# Patient Record
Sex: FEMALE | Race: ASIAN | Employment: OTHER | URBAN - METROPOLITAN AREA
[De-identification: names, ages, dates, MRNs, and addresses within clinical notes are randomized per-mention and may not be internally consistent; named-entity substitution may affect disease eponyms.]

---

## 2017-02-04 PROBLEM — I27.20 PULMONARY HYPERTENSION (HCC): Status: ACTIVE | Noted: 2017-02-04

## 2017-03-02 ENCOUNTER — APPOINTMENT (OUTPATIENT)
Dept: GENERAL RADIOLOGY | Facility: HOSPITAL | Age: 82
End: 2017-03-02
Attending: EMERGENCY MEDICINE
Payer: MEDICARE

## 2017-03-02 ENCOUNTER — APPOINTMENT (OUTPATIENT)
Dept: CT IMAGING | Facility: HOSPITAL | Age: 82
End: 2017-03-02
Attending: EMERGENCY MEDICINE
Payer: MEDICARE

## 2017-03-02 ENCOUNTER — HOSPITAL ENCOUNTER (OUTPATIENT)
Facility: HOSPITAL | Age: 82
Setting detail: OBSERVATION
Discharge: HOME HEALTH | End: 2017-03-04
Attending: EMERGENCY MEDICINE | Admitting: HOSPITALIST
Payer: MEDICARE
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
## 2017-03-02 DIAGNOSIS — R55 SYNCOPE AND COLLAPSE: ICD-10-CM

## 2017-03-02 DIAGNOSIS — S16.1XXA CERVICAL STRAIN, INITIAL ENCOUNTER: ICD-10-CM

## 2017-03-02 DIAGNOSIS — S05.11XA CONTUSION OF RIGHT ORBITAL TISSUES, INITIAL ENCOUNTER: Primary | ICD-10-CM

## 2017-03-02 DIAGNOSIS — T07.XXXA MULTIPLE CONTUSIONS: ICD-10-CM

## 2017-03-02 LAB
ALBUMIN SERPL-MCNC: 3.4 G/DL (ref 3.5–4.8)
ALP LIVER SERPL-CCNC: 72 U/L (ref 55–142)
ALT SERPL-CCNC: 19 U/L (ref 14–54)
APTT PPP: 31.1 SECONDS (ref 25–34)
AST SERPL-CCNC: 28 U/L (ref 15–41)
BASOPHILS # BLD AUTO: 0.04 X10(3) UL (ref 0–0.1)
BASOPHILS NFR BLD AUTO: 0.3 %
BILIRUB SERPL-MCNC: 0.3 MG/DL (ref 0.1–2)
BILIRUB UR QL STRIP.AUTO: NEGATIVE
BUN BLD-MCNC: 24 MG/DL (ref 8–20)
CALCIUM BLD-MCNC: 9.7 MG/DL (ref 8.3–10.3)
CHLORIDE: 109 MMOL/L (ref 101–111)
CK: 657 IU/L (ref 26–192)
CKMB: 11.3 NG/ML (ref 0–5)
CLARITY UR REFRACT.AUTO: CLEAR
CO2: 26 MMOL/L (ref 22–32)
COLOR UR AUTO: YELLOW
CREAT BLD-MCNC: 1.02 MG/DL (ref 0.55–1.02)
EOSINOPHIL # BLD AUTO: 0.01 X10(3) UL (ref 0–0.3)
EOSINOPHIL NFR BLD AUTO: 0.1 %
ERYTHROCYTE [DISTWIDTH] IN BLOOD BY AUTOMATED COUNT: 12.7 % (ref 11.5–16)
GLUCOSE BLD-MCNC: 81 MG/DL (ref 65–99)
GLUCOSE BLD-MCNC: 98 MG/DL (ref 70–99)
GLUCOSE UR STRIP.AUTO-MCNC: NEGATIVE MG/DL
HCT VFR BLD AUTO: 36.1 % (ref 34–50)
HGB BLD-MCNC: 12.1 G/DL (ref 12–16)
IMMATURE GRANULOCYTE COUNT: 0.05 X10(3) UL (ref 0–1)
IMMATURE GRANULOCYTE RATIO %: 0.4 %
INR BLD: 1.04 (ref 0.89–1.11)
KETONES UR STRIP.AUTO-MCNC: NEGATIVE MG/DL
LEUKOCYTE ESTERASE UR QL STRIP.AUTO: NEGATIVE
LYMPHOCYTES # BLD AUTO: 0.88 X10(3) UL (ref 0.9–4)
LYMPHOCYTES NFR BLD AUTO: 6.9 %
M PROTEIN MFR SERPL ELPH: 7.9 G/DL (ref 6.1–8.3)
MB INDEX: 2 % (ref ?–4)
MCH RBC QN AUTO: 30.8 PG (ref 27–33.2)
MCHC RBC AUTO-ENTMCNC: 33.5 G/DL (ref 31–37)
MCV RBC AUTO: 91.9 FL (ref 81–100)
MONOCYTES # BLD AUTO: 0.7 X10(3) UL (ref 0.1–0.6)
MONOCYTES NFR BLD AUTO: 5.5 %
NEUTROPHIL ABS PRELIM: 11.02 X10 (3) UL (ref 1.3–6.7)
NEUTROPHILS # BLD AUTO: 11.02 X10(3) UL (ref 1.3–6.7)
NEUTROPHILS NFR BLD AUTO: 86.8 %
NITRITE UR QL STRIP.AUTO: NEGATIVE
PH UR STRIP.AUTO: 7 [PH] (ref 4.5–8)
PLATELET # BLD AUTO: 318 10(3)UL (ref 150–450)
POTASSIUM SERPL-SCNC: 4.5 MMOL/L (ref 3.6–5.1)
PROT UR STRIP.AUTO-MCNC: NEGATIVE MG/DL
PSA SERPL DL<=0.01 NG/ML-MCNC: 13.6 SECONDS (ref 12–14.3)
RBC # BLD AUTO: 3.93 X10(6)UL (ref 3.8–5.1)
RBC UR QL AUTO: NEGATIVE
RED CELL DISTRIBUTION WIDTH-SD: 42.5 FL (ref 35.1–46.3)
SODIUM SERPL-SCNC: 141 MMOL/L (ref 136–144)
SP GR UR STRIP.AUTO: 1.01 (ref 1–1.03)
TROPONIN: <0.046 NG/ML (ref ?–0.05)
UROBILINOGEN UR STRIP.AUTO-MCNC: <2 MG/DL
WBC # BLD AUTO: 12.7 X10(3) UL (ref 4–13)

## 2017-03-02 PROCEDURE — 80053 COMPREHEN METABOLIC PANEL: CPT | Performed by: EMERGENCY MEDICINE

## 2017-03-02 PROCEDURE — 99285 EMERGENCY DEPT VISIT HI MDM: CPT

## 2017-03-02 PROCEDURE — 70450 CT HEAD/BRAIN W/O DYE: CPT

## 2017-03-02 PROCEDURE — 82553 CREATINE MB FRACTION: CPT | Performed by: EMERGENCY MEDICINE

## 2017-03-02 PROCEDURE — 81003 URINALYSIS AUTO W/O SCOPE: CPT | Performed by: EMERGENCY MEDICINE

## 2017-03-02 PROCEDURE — 82550 ASSAY OF CK (CPK): CPT | Performed by: EMERGENCY MEDICINE

## 2017-03-02 PROCEDURE — 85025 COMPLETE CBC W/AUTO DIFF WBC: CPT | Performed by: EMERGENCY MEDICINE

## 2017-03-02 PROCEDURE — 93005 ELECTROCARDIOGRAM TRACING: CPT

## 2017-03-02 PROCEDURE — 71020 XR CHEST PA + LAT CHEST (CPT=71020): CPT

## 2017-03-02 PROCEDURE — 96361 HYDRATE IV INFUSION ADD-ON: CPT

## 2017-03-02 PROCEDURE — 73030 X-RAY EXAM OF SHOULDER: CPT

## 2017-03-02 PROCEDURE — 96375 TX/PRO/DX INJ NEW DRUG ADDON: CPT

## 2017-03-02 PROCEDURE — 85610 PROTHROMBIN TIME: CPT | Performed by: EMERGENCY MEDICINE

## 2017-03-02 PROCEDURE — 93010 ELECTROCARDIOGRAM REPORT: CPT

## 2017-03-02 PROCEDURE — 73080 X-RAY EXAM OF ELBOW: CPT

## 2017-03-02 PROCEDURE — 85730 THROMBOPLASTIN TIME PARTIAL: CPT | Performed by: EMERGENCY MEDICINE

## 2017-03-02 PROCEDURE — 73560 X-RAY EXAM OF KNEE 1 OR 2: CPT

## 2017-03-02 PROCEDURE — 82962 GLUCOSE BLOOD TEST: CPT

## 2017-03-02 PROCEDURE — 72125 CT NECK SPINE W/O DYE: CPT

## 2017-03-02 PROCEDURE — 84484 ASSAY OF TROPONIN QUANT: CPT | Performed by: EMERGENCY MEDICINE

## 2017-03-02 PROCEDURE — 96374 THER/PROPH/DIAG INJ IV PUSH: CPT

## 2017-03-02 PROCEDURE — 73130 X-RAY EXAM OF HAND: CPT

## 2017-03-02 RX ORDER — HYDROMORPHONE HYDROCHLORIDE 1 MG/ML
0.5 INJECTION, SOLUTION INTRAMUSCULAR; INTRAVENOUS; SUBCUTANEOUS ONCE
Status: COMPLETED | OUTPATIENT
Start: 2017-03-02 | End: 2017-03-02

## 2017-03-02 RX ORDER — ONDANSETRON 2 MG/ML
INJECTION INTRAMUSCULAR; INTRAVENOUS
Status: COMPLETED
Start: 2017-03-02 | End: 2017-03-02

## 2017-03-02 RX ORDER — SODIUM CHLORIDE 9 MG/ML
INJECTION, SOLUTION INTRAVENOUS CONTINUOUS
Status: DISCONTINUED | OUTPATIENT
Start: 2017-03-02 | End: 2017-03-03

## 2017-03-02 RX ORDER — HYDROMORPHONE HYDROCHLORIDE 1 MG/ML
0.5 INJECTION, SOLUTION INTRAMUSCULAR; INTRAVENOUS; SUBCUTANEOUS EVERY 30 MIN PRN
Status: ACTIVE | OUTPATIENT
Start: 2017-03-02 | End: 2017-03-03

## 2017-03-02 RX ORDER — ONDANSETRON 2 MG/ML
4 INJECTION INTRAMUSCULAR; INTRAVENOUS EVERY 4 HOURS PRN
Status: DISCONTINUED | OUTPATIENT
Start: 2017-03-02 | End: 2017-03-04

## 2017-03-02 RX ORDER — ONDANSETRON 2 MG/ML
4 INJECTION INTRAMUSCULAR; INTRAVENOUS ONCE
Status: COMPLETED | OUTPATIENT
Start: 2017-03-02 | End: 2017-03-02

## 2017-03-03 ENCOUNTER — APPOINTMENT (OUTPATIENT)
Dept: CV DIAGNOSTICS | Facility: HOSPITAL | Age: 82
End: 2017-03-03
Attending: HOSPITALIST
Payer: MEDICARE

## 2017-03-03 ENCOUNTER — APPOINTMENT (OUTPATIENT)
Dept: GENERAL RADIOLOGY | Facility: HOSPITAL | Age: 82
End: 2017-03-03
Attending: HOSPITALIST
Payer: MEDICARE

## 2017-03-03 ENCOUNTER — APPOINTMENT (OUTPATIENT)
Dept: MRI IMAGING | Facility: HOSPITAL | Age: 82
End: 2017-03-03
Attending: PHYSICIAN ASSISTANT
Payer: MEDICARE

## 2017-03-03 LAB
ATRIAL RATE: 92 BPM
CK: 479 IU/L (ref 26–192)
CKMB: 7.4 NG/ML (ref 0–5)
MB INDEX: 2 % (ref ?–4)
P AXIS: 64 DEGREES
P-R INTERVAL: 192 MS
Q-T INTERVAL: 378 MS
QRS DURATION: 72 MS
QTC CALCULATION (BEZET): 467 MS
R AXIS: -5 DEGREES
T AXIS: 25 DEGREES
TROPONIN: <0.046 NG/ML (ref ?–0.05)
VENTRICULAR RATE: 92 BPM

## 2017-03-03 PROCEDURE — 93306 TTE W/DOPPLER COMPLETE: CPT

## 2017-03-03 PROCEDURE — 72100 X-RAY EXAM L-S SPINE 2/3 VWS: CPT

## 2017-03-03 PROCEDURE — 72141 MRI NECK SPINE W/O DYE: CPT

## 2017-03-03 PROCEDURE — 82553 CREATINE MB FRACTION: CPT | Performed by: HOSPITALIST

## 2017-03-03 PROCEDURE — 82550 ASSAY OF CK (CPK): CPT | Performed by: HOSPITALIST

## 2017-03-03 PROCEDURE — 72072 X-RAY EXAM THORAC SPINE 3VWS: CPT

## 2017-03-03 PROCEDURE — 93306 TTE W/DOPPLER COMPLETE: CPT | Performed by: INTERNAL MEDICINE

## 2017-03-03 PROCEDURE — 84484 ASSAY OF TROPONIN QUANT: CPT | Performed by: HOSPITALIST

## 2017-03-03 RX ORDER — ECHINACEA PURPUREA EXTRACT 125 MG
1 TABLET ORAL
Status: DISCONTINUED | OUTPATIENT
Start: 2017-03-03 | End: 2017-03-04

## 2017-03-03 RX ORDER — HYDROCODONE BITARTRATE AND ACETAMINOPHEN 5; 325 MG/1; MG/1
1 TABLET ORAL EVERY 6 HOURS PRN
Status: DISCONTINUED | OUTPATIENT
Start: 2017-03-03 | End: 2017-03-04

## 2017-03-03 RX ORDER — SODIUM CHLORIDE 9 MG/ML
INJECTION, SOLUTION INTRAVENOUS CONTINUOUS
Status: DISCONTINUED | OUTPATIENT
Start: 2017-03-03 | End: 2017-03-04

## 2017-03-03 RX ORDER — PANTOPRAZOLE SODIUM 40 MG/1
40 TABLET, DELAYED RELEASE ORAL
Status: DISCONTINUED | OUTPATIENT
Start: 2017-03-03 | End: 2017-03-04

## 2017-03-03 RX ORDER — FLUVOXAMINE MALEATE 50 MG/1
25 TABLET, COATED ORAL NIGHTLY
Status: DISCONTINUED | OUTPATIENT
Start: 2017-03-03 | End: 2017-03-04

## 2017-03-03 NOTE — ED PROVIDER NOTES
Patient Seen in: [facility] Emergency Department    History   Patient presents with:  Head Neck Injury (neurologic, musculoskeletal)    Stated Complaint: fall, no blood thinners, nosebleed, bruising to face    HPI  Patient is an 42-year-old female w CHOLECYSTECTOMY      HERNIA SURGERY      Comment From gallbladder surgery    CATARACT      Comment Both eyes    HYSTERECTOMY  1968    INJECTION, W/WO CONTRAST, DX/THERAPEUTIC SUBSTANCE, EPIDURAL/SUBARACHNOID; CERVICAL/THORACIC N/A 6/20/2014    Comment P PATIENT DOCUMENTED NOT TO HAVE EXPERIENCED ANY OF THE FOLLOWING EVENTS N/A 8/14/2014    Comment Procedure: CERVICAL EPIDURAL;  Surgeon: Kimberly Bejarano DO;  Location: [address] [place] [address]    INJECTION, W/WO CONTRAST, DX/THERAPEUTIC SUBSTANCE, E EPIDURAL INJECTION;  Surgeon: Josse Loyd DO;  Location: [address] [place] [address]     N/A 5/29/2016    Comment Procedure: EXPLORATORY LAPAROTOMY;  Surgeon: Adrián Victor MD;  Location:  MAIN OR    INJECTION, W/WO CONTRAST, DX/THERAPEUTIC SUBSTANC Take 650 mg by mouth every 8 (eight) hours as needed for Pain. Cholecalciferol (VITAMIN D) 2000 UNITS Oral Cap,  Take 2,000 Units by mouth daily. Calcium 500-125 MG-UNIT Oral Tab,  Take 1 tablet by mouth daily.    Multiple Vitamins-Minerals (CENTRUM MIQUEL No rebound, no guarding, no hepatosplenomegaly. EXTREMITIES: Patient has tenderness to right shoulder, right elbow and right hand. No definite swelling noted. Patient is able to move right elbow right shoulder right hand with mild pain.   Sensation intac DIFFERENTIAL WITH PLATELET.   Procedure                               Abnormality         Status                     ---------                               -----------         ------                     CBC W/ DIFFERENTIAL[[id number]]          Abnormal

## 2017-03-03 NOTE — ED NOTES
ED APCT-Angel accompanied patient out of department for testing - to Ct Scan per cart with c-collar in place.

## 2017-03-03 NOTE — PROGRESS NOTES
Orthostatics       03/03/17 1216 03/03/17 1218 03/03/17 1220   Vital Signs   Pulse 78 79 81   Heart Rate Source Monitor Monitor Monitor   Resp 11 12 12   Respiratory Quality Normal Normal Normal   /46 mmHg 118/57 mmHg 139/56 mmHg   BP Location Right

## 2017-03-03 NOTE — H&P
[facility] Hospitalist History and Physical      Patient presents with:  Head Neck Injury (neurologic, musculoskeletal)       PCP: Felisa Vilchis MD      History of Present Illness: Patient is a 80year old female with PMH sig for HTN, HL, GERD, OA/cervical arthri SURGERY      Comment From gallbladder surgery    CATARACT      Comment Both eyes    HYSTERECTOMY  1968    INJECTION, W/WO CONTRAST, DX/THERAPEUTIC SUBSTANCE, EPIDURAL/SUBARACHNOID; CERVICAL/THORACIC N/A 6/20/2014    Comment Procedure: CERVICAL EPIDURAL INJ EXPERIENCED ANY OF THE FOLLOWING EVENTS N/A 8/14/2014    Comment Procedure: CERVICAL EPIDURAL;  Surgeon: Roro Keith DO;  Location: [address] [place] [address] FOR PAIN MANAGEMENT    INJECTION, W/WO CONTRAST, DX/THERAPEUTIC SUBSTANCE, EPIDURAL/SUBARACHNOID; CERVICAL/ Eliseo Simmons DO;  Location: [address] [place] [address]     N/A 5/29/2016    Comment Procedure: EXPLORATORY LAPAROTOMY;  Surgeon: Moraima Stuart MD;  Location: [address] [place] [address] OR    INJECTION, W/WO CONTRAST, DX/THERAPEUTIC SUBSTANCE, EPIDURAL/SUBARACHNOID; CERV (eight) hours as needed for Pain. Disp:  Rfl:    Cholecalciferol (VITAMIN D) 2000 UNITS Oral Cap Take 2,000 Units by mouth daily. Disp:  Rfl:    Calcium 500-125 MG-UNIT Oral Tab Take 1 tablet by mouth daily.  Disp:  Rfl:    Multiple Vitamins-Minerals (CENTR TP 7.9 03/02/2017   AST 28 03/02/2017   ALT 19 03/02/2017   PTT 31.1 03/02/2017   INR 1.04 03/02/2017   PTP 13.6 03/02/2017   TROP <0.046 03/03/2017    03/03/2017   CKMB 7.4 03/03/2017   PGLU 81 03/02/2017       CXR: image personally reviewed. Negative. No visible soft tissue swelling. EFFUSION:  None visible. OTHER:  Negative. 3/2/2017  CONCLUSION:  Negative for fracture.     Dictated by: Roberto Kim MD on 3/02/2017 at 22:49     Approved by: Roberto Kim MD            Xr Elbow, Comp 22: 54     Approved by: Paula Chris MD            Xr Shoulder, Complete (min 2 Views), Right (cpt=73030)    3/2/2017  PROCEDURE:  XR SHOULDER, COMPLETE (MIN 2 VIEWS), RIGHT (CPT=73030)     TECHNIQUE:  Multiple views were obtained. COMPARISON:  None.   I 3/02/2017 at 21:16     Approved by: Sandra Oneil MD            Ct Spine Cervical (cpt=72125)    3/2/2017  PROCEDURE:  CT OF THE CERVICAL SPINE WITHOUT CONTRAST  COMPARISON:  None.   INDICATIONS:  fall, no blood thinners, nosebleed, bruising to face  [facility] records or previous hospital records reviewed. [facility] hospitalist to continue to follow patient while in house  A total of 70 minutes taken with patient and coordinating care. Greater than 50% face to face encounter.       Dennis Jaimes MD  [facility] Hospitalist

## 2017-03-03 NOTE — ED INITIAL ASSESSMENT (HPI)
Patient presents after found by daughter after unwitnessed fall. Patient's daughter states she returned home from work and went to open door and patient was on the floor blocking her entry.  Patient speaks no [place], but reported to daughter that she went

## 2017-03-03 NOTE — PLAN OF CARE
No focal neuro deficits, no sensation deficits, no incontinence  Hemodynamically stable without orthostatic hypotension and maintains NSR  Up with assistance to the bathroom  Physical therapy to see  Seen by Spine; MRI pending    NEUROLOGICAL - ADULT    •

## 2017-03-03 NOTE — CONSULTS
[facility] Cardiology Consultation 3/3/2017      Asuncion Alberts Patient Status:  Observation    1928 MRN [id number]   [facility] 7NE-A Attending Telly Page MD   Hosp Day # 1 PCP Willie Correa MD     Initial Impression: Daughters translate   • Cervical arthritis ([facility] [address] 2014     Relates to neck and shoulder.-   • Shoulder pain 6/2/2014   • Hypertension    • Hypercholesterolemia    • Reflux    • High blood pressure    • High cholesterol          Past Surgical History    [facility] FOR PAIN MANAGEMENT    PATIENT [place] PREOPERATIVE ORDER FOR IV ANTIBIOTIC SURGICAL SITE INFECTION PROPHYLAXIS.  N/A 8/14/2014    Comment Procedure: CERVICAL EPIDURAL;  Surgeon: Quan Zavaleta DO;  Location: [address] [place] [address] 10/19/2015    Comment Procedure: CERVICAL EPIDURAL INJECTION;  Surgeon: Italo Jeronimo DO;  Location: [address] [place] [address]    PATIENT DOCUMENTED NOT TO HAVE EXPERIENCED ANY OF THE FOLLOWING EVENTS N/A 10/19/2015    Comment Procedure: CERVICAL EPIDUR infections  HEENT: He has pain on the right side of her face  Pulmonary: Shortness of breath  Cardiac: No chest pain  GI: Negative  : Negative  Endocrine: Negative  Neuro: Dizziness  Extremities: Leg swelling  All other points of review of systems are ne Date Value   08/19/2016 236*   ----------  HDL CHOLESTEROL (mg/dL)   Date Value   05/28/2016 65   04/28/2014 48   ----------  DIRECT HDL (mg/dL)   Date Value   08/19/2016 60   ----------  LDL CHOLESTEROL CALC (mg/dL)   Date Value   04/28/2014 173*   ----

## 2017-03-03 NOTE — PROGRESS NOTES
Orthostatics       03/03/17 0913 03/03/17 0915 03/03/17 0916   Vital Signs   Pulse 90 91 89   Heart Rate Source Monitor Monitor Monitor   Resp 12 17 17   Respiratory Quality Normal Normal Normal   /57 mmHg 129/54 mmHg 122/54 mmHg   BP Location Right

## 2017-03-03 NOTE — PLAN OF CARE
Through translation of daughter patient denies complaints of numbness and tingling. Has complaints of neck, back and right leg pain, but declines additional norco at this time. Upon inspection posterior thoracic with some redness. Ice packs provided.   Lorena Khan

## 2017-03-03 NOTE — CONSULTS
[facility]    Spine Surgery H&P  Dr. Janet Bonner Patient Status:  Observation    1928 MRN [id number]   [facility]-A Attending Alexandro Thorpe MD   Hosp Day # 1 PCP Chavez Meyer MD     Subjective:   Jan Leger collar   Back : Tender on palpation   Gait : Unsteady     Upper Extremities Motor Exam:    Deep Tendon reflexes :     FDP WE WF EE EF Delt     BTR TTR   Right 5 5 5 5 5 5  Right :  2+ 2+  Left 5 5 5 5 5  5 /5  Left   :  2+ 2+    Sacral :  No incontinence o 08/05/2013   INR 1.04 03/02/2017   INR 1.01 05/27/2016   INR 1.03 04/23/2015       Lab Results  Component Value Date    03/02/2017   K 4.5 03/02/2017    03/02/2017   CO2 26.0 03/02/2017   BUN 24 03/02/2017   CREATSERUM 1.02 03/02/2017   GLU 98

## 2017-03-03 NOTE — ED NOTES
Report given to [facility] x [phone] at . Transport paged.

## 2017-03-03 NOTE — CM/SW NOTE
Pt seen for d/c planning. Pt lives with her dtr [address] in a 2nd floor condo with an elevator. Pt is usually independent for her adls and walks with a cane. She gets a senior living caregiver thru [facility] 10 hrs/week.   Pt has no histor

## 2017-03-04 VITALS
SYSTOLIC BLOOD PRESSURE: 149 MMHG | DIASTOLIC BLOOD PRESSURE: 70 MMHG | OXYGEN SATURATION: 96 % | WEIGHT: 108.94 LBS | TEMPERATURE: 98 F | HEIGHT: 59.02 IN | BODY MASS INDEX: 21.96 KG/M2 | RESPIRATION RATE: 18 BRPM | HEART RATE: 98 BPM

## 2017-03-04 LAB
BASOPHILS # BLD AUTO: 0.04 X10(3) UL (ref 0–0.1)
BASOPHILS NFR BLD AUTO: 0.7 %
BUN BLD-MCNC: 22 MG/DL (ref 8–20)
CALCIUM BLD-MCNC: 9.2 MG/DL (ref 8.3–10.3)
CHLORIDE: 111 MMOL/L (ref 101–111)
CO2: 25 MMOL/L (ref 22–32)
CREAT BLD-MCNC: 0.82 MG/DL (ref 0.55–1.02)
EOSINOPHIL # BLD AUTO: 0.2 X10(3) UL (ref 0–0.3)
EOSINOPHIL NFR BLD AUTO: 3.4 %
ERYTHROCYTE [DISTWIDTH] IN BLOOD BY AUTOMATED COUNT: 12.8 % (ref 11.5–16)
GLUCOSE BLD-MCNC: 103 MG/DL (ref 70–99)
HCT VFR BLD AUTO: 34.3 % (ref 34–50)
HGB BLD-MCNC: 10.9 G/DL (ref 12–16)
IMMATURE GRANULOCYTE COUNT: 0.02 X10(3) UL (ref 0–1)
IMMATURE GRANULOCYTE RATIO %: 0.3 %
LYMPHOCYTES # BLD AUTO: 0.81 X10(3) UL (ref 0.9–4)
LYMPHOCYTES NFR BLD AUTO: 13.9 %
MCH RBC QN AUTO: 30.7 PG (ref 27–33.2)
MCHC RBC AUTO-ENTMCNC: 31.8 G/DL (ref 31–37)
MCV RBC AUTO: 96.6 FL (ref 81–100)
MONOCYTES # BLD AUTO: 0.44 X10(3) UL (ref 0.1–0.6)
MONOCYTES NFR BLD AUTO: 7.6 %
NEUTROPHIL ABS PRELIM: 4.3 X10 (3) UL (ref 1.3–6.7)
NEUTROPHILS # BLD AUTO: 4.3 X10(3) UL (ref 1.3–6.7)
NEUTROPHILS NFR BLD AUTO: 74.1 %
PLATELET # BLD AUTO: 286 10(3)UL (ref 150–450)
POTASSIUM SERPL-SCNC: 3.8 MMOL/L (ref 3.6–5.1)
RBC # BLD AUTO: 3.55 X10(6)UL (ref 3.8–5.1)
RED CELL DISTRIBUTION WIDTH-SD: 45.4 FL (ref 35.1–46.3)
SODIUM SERPL-SCNC: 141 MMOL/L (ref 136–144)
WBC # BLD AUTO: 5.8 X10(3) UL (ref 4–13)

## 2017-03-04 PROCEDURE — 80048 BASIC METABOLIC PNL TOTAL CA: CPT | Performed by: HOSPITALIST

## 2017-03-04 PROCEDURE — 85025 COMPLETE CBC W/AUTO DIFF WBC: CPT | Performed by: HOSPITALIST

## 2017-03-04 PROCEDURE — 97163 PT EVAL HIGH COMPLEX 45 MIN: CPT

## 2017-03-04 RX ORDER — HYDROCODONE BITARTRATE AND ACETAMINOPHEN 5; 325 MG/1; MG/1
1 TABLET ORAL EVERY 6 HOURS PRN
Qty: 15 TABLET | Refills: 0 | Status: SHIPPED | OUTPATIENT
Start: 2017-03-04 | End: 2017-12-27 | Stop reason: ALTCHOICE

## 2017-03-04 RX ORDER — MECLIZINE HCL 12.5 MG/1
12.5 TABLET ORAL 3 TIMES DAILY PRN
Qty: 30 TABLET | Refills: 0 | Status: SHIPPED | OUTPATIENT
Start: 2017-03-04 | End: 2018-06-04

## 2017-03-04 RX ORDER — MECLIZINE HCL 12.5 MG/1
12.5 TABLET ORAL 3 TIMES DAILY PRN
Status: DISCONTINUED | OUTPATIENT
Start: 2017-03-04 | End: 2017-03-04

## 2017-03-04 NOTE — PLAN OF CARE
Orthostatics     03/03/17 2000 03/03/17 2005 03/03/17 2010   Vital Signs   Pulse 81 85 84   Resp 16 16 16   /66 mmHg (!) 161/65 mmHg 156/64 mmHg   Patient Position Lying Sitting Standing     A little lightheaded upon standing.

## 2017-03-04 NOTE — HOME CARE LIAISON
Received referral for Residential Home Health. Message left with daughter Stiven Aj [phone]. Referral sent to [facility] via [address]. [facility] has accepted pt and will provide skilled nurse (one visit as requested), physical therapy, and occupational therapy.

## 2017-03-04 NOTE — CM/SW NOTE
Received call from CARLEY to assist with OBS questions family has. Requested to call pt's room. I phoned room and spoke with pt's daughter Mrs. Triston Delarosa.  Daughter with [place] [phone] questions, clarified and multiple reiterations on Residential [place] [phone] and contact info and

## 2017-03-04 NOTE — PLAN OF CARE
Assumed care. Patient without events overnight. Slept. Neuro's stable, unchanged. Orthostatics negative. IVF infusing overnight. Patient ambulating with help to bathroom. Will monitor. Family at bedside.

## 2017-03-04 NOTE — CM/SW NOTE
sw spoke to pts dtr and confirmed they will be providing 24 hour supervision. sw discussed hhc and dtr is agreeable, to therapy only and one nursing visit. dtr agreeable to referral to residential hhc.  [facility] liaison notified    Trav aPndya

## 2017-03-04 NOTE — PHYSICAL THERAPY NOTE
PHYSICAL THERAPY EVALUATION - INPATIENT     Room Number: 6970/1229-U  Evaluation Date: 3/4/2017  Type of Evaluation: Initial  Physician Order: PT Eval and Treat    Presenting Problem: weakness, s/p fall  Reason for Therapy: Mobility Dysfunction and Dis Location: [address] [place] [address]    M-SEDAJ BY  PHYS [facility] SVC 5+ YR N/A 6/20/2014    Comment Procedure: CERVICAL EPIDURAL INJECTION;  Surgeon: Apoorva Monte DO;  Location: [address] [place] [address]    PATIENT [facility] IV AN CERVICAL EPIDURAL;  Surgeon: Georgiana Jarrett DO;  Location: [address] [place] [address] BY [facility] [address] [address]. [facility]  N SVC 5+ YR N/A 2/26/2015    Comment Procedure: CERVICAL EPIDURAL;  Surgeon: Georgiana Jarrett DO;  Location: [address] [place] [address] CERVICAL EPIDURAL INJECTION;  Surgeon: Kimberly Bejarano DO;  Location: [address] [place] [address]    PATIENT [address] [place] [address] FOR IV ANTIBIOTIC SURGICAL SITE INFECTION PROPHYLAXIS.  N/A 8/2/2016    Comment Procedure: CERVICAL EPIDURAL INJECTION; Scale           Modified Figueredo Balance Scale: 10/28                     NEUROLOGICAL FINDINGS                      ACTIVITY TOLERANCE  Room air    AM-PAC '6-Clicks' INPATIENT SHORT FORM - BASIC MOBILITY  How much difficulty does the patient currently have. Helena Malagon agreeable to look into different companies and to have 24 hr supervision for pt at home.     Exercise/Education Provided:  Energy conservation  Functional activity tolerated  Gait training  ROM  Transfer training    Patient End of Session: In bed;Needs met;

## 2017-03-04 NOTE — PROGRESS NOTES
Orthostatics  Some dizziness this am with position changes.   Daughter at bedside states she has history of vertigo  1+ edema to bilat lower extremities  IVF stopped     03/04/17 0816 03/04/17 0818 03/04/17 0822   Vital Signs   Pulse 90 93 98   Heart Rate S

## 2017-03-04 NOTE — DISCHARGE SUMMARY
General Medicine Discharge Summary     Patient ID:  Nilsa Sullivan  80year old  5/27/1928    Admit date: 3/2/2017    Discharge date and time: 3/4/17    Attending Physician: Jacquelin Mcclain MD     Primary Ca OA/cervical arthritis who presented to ED by family after being found on the ground.  History obtained from chart review and from daughter at bedside, patient does not speak English.  Per daughter, patient was found on the ground by the patient's other daug C-spine again demonstrating disc herniation at C3-C4.  F/u as outpatient as listed above      HTN  -On PO lasix, held for now pending orthostatics    GERD  -PPI        Consults: IP CONSULT TO HOSPITALIST  IP CONSULT TO CARDIOLOGY  IP CONSULT TO PHYSICAL THE active wheezing  Abdomen: nontender, nondistended, intact BS  Extremities: no clubbing/cyanosis   Neuro: CN inact, no focal deficits      Total time coordinating care for discharge: Greater than 30 minutes    Nicholas Finley MD  [facility] Hospitalist  [phone]

## 2017-03-04 NOTE — PLAN OF CARE
Hemodynamically stable; transient dizziness  P.T.  Recommends home with home health and 24 hour supervision  Spoke with Dr. Frederick Lares via phone, she is ok to discharge and follow up in office with cervical collar  No focal neuro deficits  Norco for pain    Rev

## 2017-03-04 NOTE — CM/SW NOTE
03/04/17 1200   Discharge disposition   Discharged to: Home-Health   Name of Facillity/Home Care/Hospice Residential

## 2017-03-04 NOTE — PROGRESS NOTES
[facility] Cardiology  Progress Note    Kristie Day Patient Status:  Observation    1928 MRN [id number]   [facility] 7NE-A Attending Chas Pride MD   Hosp Day # 2 PCP Maureen Najera MD     Impression:  1.  Syncope  2.  [place] Medications:  Meclizine HCl (ANTIVERT) tab 12.5 mg 12.5 mg Oral TID PRN   HYDROcodone-acetaminophen (NORCO) 5-325 MG per tab 1 tablet 1 tablet Oral Q6H PRN   FluvoxaMINE Maleate (LUVOX) tab 25 mg 25 mg Oral Nightly   Pantoprazole Sodium (PROTONIX) EC tab 4

## 2017-03-22 PROBLEM — M54.12 BRACHIAL (CERVICAL) NEURITIS: Status: ACTIVE | Noted: 2017-03-22

## 2017-03-22 PROBLEM — M50.30 DEGENERATION OF CERVICAL INTERVERTEBRAL DISC: Status: ACTIVE | Noted: 2017-03-22

## 2017-03-22 PROBLEM — M48.02 SPINAL STENOSIS IN CERVICAL REGION: Status: ACTIVE | Noted: 2017-03-22

## 2017-04-03 PROBLEM — M50.20 HERNIATED CERVICAL DISC: Status: ACTIVE | Noted: 2017-04-03

## 2017-10-14 PROBLEM — M17.0 PRIMARY OSTEOARTHRITIS OF BOTH KNEES: Status: ACTIVE | Noted: 2017-10-14

## 2018-01-14 ENCOUNTER — HOSPITAL ENCOUNTER (EMERGENCY)
Facility: HOSPITAL | Age: 83
Discharge: HOME | End: 2018-01-14
Attending: EMERGENCY MEDICINE
Payer: MEDICARE

## 2018-01-14 VITALS
OXYGEN SATURATION: 97 % | RESPIRATION RATE: 17 BRPM | HEART RATE: 88 BPM | TEMPERATURE: 99 F | DIASTOLIC BLOOD PRESSURE: 68 MMHG | HEIGHT: 60 IN | WEIGHT: 116 LBS | BODY MASS INDEX: 22.78 KG/M2 | SYSTOLIC BLOOD PRESSURE: 135 MMHG

## 2018-01-14 DIAGNOSIS — T36.95XA ANTIBIOTIC-ASSOCIATED DIARRHEA: Primary | ICD-10-CM

## 2018-01-14 DIAGNOSIS — K52.1 ANTIBIOTIC-ASSOCIATED DIARRHEA: Primary | ICD-10-CM

## 2018-01-14 LAB
ALBUMIN SERPL-MCNC: 3 G/DL (ref 3.5–4.8)
ALP LIVER SERPL-CCNC: 63 U/L (ref 55–142)
ALT SERPL-CCNC: 17 U/L (ref 14–54)
AST SERPL-CCNC: 25 U/L (ref 15–41)
BASOPHILS # BLD AUTO: 0.03 X10(3) UL (ref 0–0.1)
BASOPHILS NFR BLD AUTO: 0.4 %
BILIRUB SERPL-MCNC: 0.8 MG/DL (ref 0.1–2)
BUN BLD-MCNC: 17 MG/DL (ref 8–20)
CALCIUM BLD-MCNC: 9 MG/DL (ref 8.3–10.3)
CHLORIDE: 107 MMOL/L (ref 101–111)
CO2: 22 MMOL/L (ref 22–32)
CREAT BLD-MCNC: 1.06 MG/DL (ref 0.55–1.02)
EOSINOPHIL # BLD AUTO: 0.15 X10(3) UL (ref 0–0.3)
EOSINOPHIL NFR BLD AUTO: 2 %
ERYTHROCYTE [DISTWIDTH] IN BLOOD BY AUTOMATED COUNT: 13.4 % (ref 11.5–16)
GLUCOSE BLD-MCNC: 100 MG/DL (ref 70–99)
HCT VFR BLD AUTO: 37.1 % (ref 34–50)
HGB BLD-MCNC: 12.2 G/DL (ref 12–16)
IMMATURE GRANULOCYTE COUNT: 0.03 X10(3) UL (ref 0–1)
IMMATURE GRANULOCYTE RATIO %: 0.4 %
LYMPHOCYTES # BLD AUTO: 1.2 X10(3) UL (ref 0.9–4)
LYMPHOCYTES NFR BLD AUTO: 15.8 %
M PROTEIN MFR SERPL ELPH: 7.3 G/DL (ref 6.1–8.3)
MCH RBC QN AUTO: 30.9 PG (ref 27–33.2)
MCHC RBC AUTO-ENTMCNC: 32.9 G/DL (ref 31–37)
MCV RBC AUTO: 93.9 FL (ref 81–100)
MONOCYTES # BLD AUTO: 0.72 X10(3) UL (ref 0.1–0.6)
MONOCYTES NFR BLD AUTO: 9.5 %
NEUTROPHIL ABS PRELIM: 5.45 X10 (3) UL (ref 1.3–6.7)
NEUTROPHILS # BLD AUTO: 5.45 X10(3) UL (ref 1.3–6.7)
NEUTROPHILS NFR BLD AUTO: 71.9 %
PLATELET # BLD AUTO: 306 10(3)UL (ref 150–450)
POTASSIUM SERPL-SCNC: 3.7 MMOL/L (ref 3.6–5.1)
RBC # BLD AUTO: 3.95 X10(6)UL (ref 3.8–5.1)
RED CELL DISTRIBUTION WIDTH-SD: 46 FL (ref 35.1–46.3)
SODIUM SERPL-SCNC: 136 MMOL/L (ref 136–144)
WBC # BLD AUTO: 7.6 X10(3) UL (ref 4–13)

## 2018-01-14 PROCEDURE — 85025 COMPLETE CBC W/AUTO DIFF WBC: CPT

## 2018-01-14 PROCEDURE — 36415 COLL VENOUS BLD VENIPUNCTURE: CPT

## 2018-01-14 PROCEDURE — 85025 COMPLETE CBC W/AUTO DIFF WBC: CPT | Performed by: EMERGENCY MEDICINE

## 2018-01-14 PROCEDURE — 99283 EMERGENCY DEPT VISIT LOW MDM: CPT

## 2018-01-14 PROCEDURE — 80053 COMPREHEN METABOLIC PANEL: CPT

## 2018-01-14 PROCEDURE — 80053 COMPREHEN METABOLIC PANEL: CPT | Performed by: EMERGENCY MEDICINE

## 2018-01-14 RX ORDER — METRONIDAZOLE 500 MG/1
500 TABLET ORAL 3 TIMES DAILY
Qty: 30 TABLET | Refills: 0 | Status: SHIPPED | OUTPATIENT
Start: 2018-01-14 | End: 2018-01-24

## 2018-01-14 NOTE — ED INITIAL ASSESSMENT (HPI)
Pt to ED with c/o diarrhea x5 days. Daughter sts pt was placed on an abx around Xmas for a cough. Denies fever, N/V. Abd cramping intermittently. Decreased appetite and urine output.

## 2018-01-14 NOTE — ED PROVIDER NOTES
Patient Seen in: [facility] Emergency Department    History   Patient presents with:  Nausea/Vomiting/Diarrhea (gastrointestinal)    Stated Complaint: n/d    HPI    Patient was brought to the emergency department for evaluation for diarrhea.   Tristian Lakhani Comment: From gallbladder surgery  1968: HYSTERECTOMY  6/20/2014: INJECTION, W/WO CONTRAST, DX/THERAPEUTIC SUBST* N/A      Comment: Procedure: CERVICAL EPIDURAL INJECTION;                 Surgeon: Christene Buerger, DO;  Location: G                SURGIC VIOLA-NARESH BY ANNETTE PABLO [facility] 5+ YR N/A      Comment: Procedure: CERVICAL EPIDURAL INJECTION;                 Surgeon: Italo Jeronimo DO;  Location: [address] [place] [address]  6/20/2014: PATIENT DOCUMENTED NOT TO HAVE EXPERIENCED ANY* N/A FOR IV ANT* N/A      Comment: Procedure: CERVICAL EPIDURAL INJECTION;                 Surgeon: Eliseo Simmons DO;  Location: [address] [place] [address]  8/2/2016: PATIENT [place] PREOPERATIVE ORDER FOR IV ANT* N/A      Comment: Procedure: Nursing note and vitals reviewed.            ED Course     Labs Reviewed   COMP METABOLIC PANEL (14) - Abnormal; Notable for the following:        Result Value    Glucose 100 (*)     Creatinine 1.06 (*)     GFR 47 (*)     Albumin 3.0 (*)     All other com (three) times daily. , Print Script, Disp-30 tablet, [employer]

## 2018-03-09 ENCOUNTER — APPOINTMENT (OUTPATIENT)
Dept: CT IMAGING | Facility: HOSPITAL | Age: 83
End: 2018-03-09
Attending: EMERGENCY MEDICINE
Payer: MEDICARE

## 2018-03-09 ENCOUNTER — HOSPITAL ENCOUNTER (EMERGENCY)
Facility: HOSPITAL | Age: 83
Discharge: HOME | End: 2018-03-09
Attending: EMERGENCY MEDICINE
Payer: MEDICARE

## 2018-03-09 VITALS
TEMPERATURE: 98 F | BODY MASS INDEX: 25.03 KG/M2 | HEART RATE: 89 BPM | SYSTOLIC BLOOD PRESSURE: 131 MMHG | RESPIRATION RATE: 20 BRPM | OXYGEN SATURATION: 96 % | DIASTOLIC BLOOD PRESSURE: 71 MMHG | WEIGHT: 116 LBS | HEIGHT: 57 IN

## 2018-03-09 DIAGNOSIS — E86.0 DEHYDRATION: ICD-10-CM
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
## 2018-03-09 DIAGNOSIS — S09.90XA INJURY OF HEAD, INITIAL ENCOUNTER: ICD-10-CM

## 2018-03-09 LAB
ALBUMIN SERPL-MCNC: 3.5 G/DL (ref 3.5–4.8)
ALP LIVER SERPL-CCNC: 63 U/L (ref 55–142)
ALT SERPL-CCNC: 22 U/L (ref 14–54)
AST SERPL-CCNC: 25 U/L (ref 15–41)
ATRIAL RATE: 96 BPM
BASOPHILS # BLD AUTO: 0.05 X10(3) UL (ref 0–0.1)
BASOPHILS NFR BLD AUTO: 0.4 %
BILIRUB SERPL-MCNC: 0.4 MG/DL (ref 0.1–2)
BUN BLD-MCNC: 27 MG/DL (ref 8–20)
CALCIUM BLD-MCNC: 9.7 MG/DL (ref 8.3–10.3)
CHLORIDE: 112 MMOL/L (ref 101–111)
CO2: 24 MMOL/L (ref 22–32)
CREAT BLD-MCNC: 1.29 MG/DL (ref 0.55–1.02)
EOSINOPHIL # BLD AUTO: 0.02 X10(3) UL (ref 0–0.3)
EOSINOPHIL NFR BLD AUTO: 0.1 %
ERYTHROCYTE [DISTWIDTH] IN BLOOD BY AUTOMATED COUNT: 14 % (ref 11.5–16)
GLUCOSE BLD-MCNC: 116 MG/DL (ref 70–99)
HCT VFR BLD AUTO: 39.3 % (ref 34–50)
HGB BLD-MCNC: 12.7 G/DL (ref 12–16)
IMMATURE GRANULOCYTE COUNT: 0.06 X10(3) UL (ref 0–1)
IMMATURE GRANULOCYTE RATIO %: 0.4 %
LYMPHOCYTES # BLD AUTO: 0.72 X10(3) UL (ref 0.9–4)
LYMPHOCYTES NFR BLD AUTO: 5.3 %
M PROTEIN MFR SERPL ELPH: 8.1 G/DL (ref 6.1–8.3)
MCH RBC QN AUTO: 30.2 PG (ref 27–33.2)
MCHC RBC AUTO-ENTMCNC: 32.3 G/DL (ref 31–37)
MCV RBC AUTO: 93.6 FL (ref 81–100)
MONOCYTES # BLD AUTO: 0.75 X10(3) UL (ref 0.1–1)
MONOCYTES NFR BLD AUTO: 5.5 %
NEUTROPHIL ABS PRELIM: 12.11 X10 (3) UL (ref 1.3–6.7)
NEUTROPHILS # BLD AUTO: 12.11 X10(3) UL (ref 1.3–6.7)
NEUTROPHILS NFR BLD AUTO: 88.3 %
P AXIS: 46 DEGREES
P-R INTERVAL: 208 MS
PLATELET # BLD AUTO: 313 10(3)UL (ref 150–450)
POTASSIUM SERPL-SCNC: 4 MMOL/L (ref 3.6–5.1)
Q-T INTERVAL: 380 MS
QRS DURATION: 70 MS
QTC CALCULATION (BEZET): 480 MS
R AXIS: -6 DEGREES
RBC # BLD AUTO: 4.2 X10(6)UL (ref 3.8–5.1)
RED CELL DISTRIBUTION WIDTH-SD: 48 FL (ref 35.1–46.3)
SODIUM SERPL-SCNC: 145 MMOL/L (ref 136–144)
T AXIS: 19 DEGREES
VENTRICULAR RATE: 96 BPM
WBC # BLD AUTO: 13.7 X10(3) UL (ref 4–13)

## 2018-03-09 PROCEDURE — 80053 COMPREHEN METABOLIC PANEL: CPT | Performed by: EMERGENCY MEDICINE

## 2018-03-09 PROCEDURE — 90471 IMMUNIZATION ADMIN: CPT

## 2018-03-09 PROCEDURE — 96361 HYDRATE IV INFUSION ADD-ON: CPT

## 2018-03-09 PROCEDURE — 93010 ELECTROCARDIOGRAM REPORT: CPT

## 2018-03-09 PROCEDURE — 70450 CT HEAD/BRAIN W/O DYE: CPT | Performed by: EMERGENCY MEDICINE

## 2018-03-09 PROCEDURE — 72125 CT NECK SPINE W/O DYE: CPT | Performed by: EMERGENCY MEDICINE

## 2018-03-09 PROCEDURE — 99285 EMERGENCY DEPT VISIT HI MDM: CPT

## 2018-03-09 PROCEDURE — 93005 ELECTROCARDIOGRAM TRACING: CPT

## 2018-03-09 PROCEDURE — 96360 HYDRATION IV INFUSION INIT: CPT

## 2018-03-09 PROCEDURE — 85025 COMPLETE CBC W/AUTO DIFF WBC: CPT | Performed by: EMERGENCY MEDICINE

## 2018-03-09 RX ORDER — ACETAMINOPHEN 500 MG
1000 TABLET ORAL ONCE
Status: COMPLETED | OUTPATIENT
Start: 2018-03-09 | End: 2018-03-09

## 2018-03-09 RX ORDER — TETANUS AND DIPHTHERIA TOXOIDS ADSORBED 2; 2 [LF]/.5ML; [LF]/.5ML
0.5 INJECTION INTRAMUSCULAR ONCE
Status: COMPLETED | OUTPATIENT
Start: 2018-03-09 | End: 2018-03-09

## 2018-03-09 RX ORDER — ACETAMINOPHEN 500 MG
TABLET ORAL
Status: DISCONTINUED
Start: 2018-03-09 | End: 2018-03-09

## 2018-03-09 NOTE — ED NOTES
Patient's IV discontinued. Family instructed to assist patient in dressing and discharge instructions will be provided.

## 2018-03-09 NOTE — ED NOTES
Discharge instructions provided for patient and family. Instructions on appropriate and safe use of walker also provided. Escorted out of department via wheelchair.

## 2018-03-09 NOTE — ED INITIAL ASSESSMENT (HPI)
Pt's daughter sts that pt went to washroom this am and they heard her calling her. Pt had fallen and has a lac on the back R side of her head. Pt appears in NAD. Pt's daughter sts that she is acting normally. Pt has strong hand grasp.  Pt's daughters are me

## 2018-03-09 NOTE — ED NOTES
Bedside report received from Lizette Ferguson, [facility]. ERMD at bedside discussing [address] and results with family.

## 2018-03-09 NOTE — ED NOTES
Patient ambulated to restroom. Her gait was unsteady. Family reports patient uses a cane to ambulate at home. Provided with walker to assist in improving steadiness, family refused to allow patient to use walker stating \"she only uses a cane. \" ERMD notif

## 2018-03-09 NOTE — ED PROVIDER NOTES
Patient Seen in: [facility] Emergency Department    History   Patient presents with:  Fall (musculoskeletal, neurologic)  Laceration Abrasion (integumentary)    Stated Complaint: FALL, LAC TO HEAD    HPI    Patient is an 63-year-old female comes ben FLUOR GID & LOCLZJ NDL/CATH SPI DX/THER NJX N/A      Comment: Procedure: CERVICAL EPIDURAL;  Surgeon:                Rogers Davis DO;  Location: [facility] FOR               PAIN MANAGEMENT  No date: HERNIA SURGERY      Comment: From gallbladder surge MANAGEMENT  10/19/2015: JOHNIE BY ANNETTE PABLO [facility] 5+ YR N/A      Comment: Procedure: CERVICAL EPIDURAL INJECTION;                 Surgeon: Constance Salgado DO;  Location: [address] [place] [address]  8/2/2016: JOHNIE BY ANNETTE PABLO S PREOPERATIVE ORDER FOR IV ANT* N/A      Comment: Procedure: CERVICAL EPIDURAL;  Surgeon:                Oscar Roberts DO;  Location: [place] [address] MANAGEMENT  10/19/2015: PATIENT [place] PREOPERATIVE ORDER FOR IV ANT* N/A      Comm Lungs clear to auscultation bilaterally, no wheezing, no rales, no rhonchi.   CARDIOVASCULAR: Regular rate and rhythm, normal S1-S2, no S3-S4 or murmur  CHEST: No tenderness, no crepitus, no ecchymosis, no abrasions  BACK: No midline lumbar or thoracic tend Sodium 145. White blood cell count 13.7  EKG    Rate, intervals and axes as noted on EKG Report.   Rate: 96  Rhythm: Sinus Rhythm  Reading: No acute changes         Ct Brain Or Head ([address])    Result Date: 3/9/2018  PROCEDURE:  CT BRAIN OR HEAD (605[address])  CO scanning of the cervical spine is performed from the skull base through C7. Multiplanar reconstructions are generated. Dose reduction techniques were used.  Dose information is transmitted to the [facility] of Radiology) Aldo Steele [facility] Radiolog changes are present. If clinical symptoms persist recommend MRI.     Dictated by: Sachi Fong MD on 3/09/2018 at 8:44     Approved by: Sachi Fong MD            ED Course as of Mar 09 1221  ------------------------------------------------------------  Velma petit 3/9/2018 10:58 AM

## 2018-06-04 ENCOUNTER — HOSPITAL ENCOUNTER (OUTPATIENT)
Facility: HOSPITAL | Age: 83
Setting detail: OBSERVATION
Discharge: HOME | End: 2018-06-05
Attending: EMERGENCY MEDICINE | Admitting: INTERNAL MEDICINE
Payer: MEDICARE

## 2018-06-04 ENCOUNTER — APPOINTMENT (OUTPATIENT)
Dept: CT IMAGING | Facility: HOSPITAL | Age: 83
End: 2018-06-04
Attending: EMERGENCY MEDICINE
Payer: MEDICARE

## 2018-06-04 ENCOUNTER — APPOINTMENT (OUTPATIENT)
Dept: GENERAL RADIOLOGY | Facility: HOSPITAL | Age: 83
End: 2018-06-04
Attending: EMERGENCY MEDICINE
Payer: MEDICARE

## 2018-06-04 DIAGNOSIS — R11.2 NAUSEA AND VOMITING IN ADULT: ICD-10-CM

## 2018-06-04 DIAGNOSIS — R10.9 ABDOMINAL PAIN OF UNKNOWN ETIOLOGY: Primary | ICD-10-CM

## 2018-06-04 PROCEDURE — 87186 SC STD MICRODIL/AGAR DIL: CPT | Performed by: EMERGENCY MEDICINE

## 2018-06-04 PROCEDURE — 85025 COMPLETE CBC W/AUTO DIFF WBC: CPT | Performed by: EMERGENCY MEDICINE

## 2018-06-04 PROCEDURE — 87077 CULTURE AEROBIC IDENTIFY: CPT | Performed by: EMERGENCY MEDICINE

## 2018-06-04 PROCEDURE — 96361 HYDRATE IV INFUSION ADD-ON: CPT

## 2018-06-04 PROCEDURE — 99285 EMERGENCY DEPT VISIT HI MDM: CPT

## 2018-06-04 PROCEDURE — 71045 X-RAY EXAM CHEST 1 VIEW: CPT | Performed by: EMERGENCY MEDICINE

## 2018-06-04 PROCEDURE — 84484 ASSAY OF TROPONIN QUANT: CPT | Performed by: EMERGENCY MEDICINE

## 2018-06-04 PROCEDURE — 96375 TX/PRO/DX INJ NEW DRUG ADDON: CPT

## 2018-06-04 PROCEDURE — 87086 URINE CULTURE/COLONY COUNT: CPT | Performed by: EMERGENCY MEDICINE

## 2018-06-04 PROCEDURE — 81001 URINALYSIS AUTO W/SCOPE: CPT | Performed by: EMERGENCY MEDICINE

## 2018-06-04 PROCEDURE — 74177 CT ABD & PELVIS W/CONTRAST: CPT | Performed by: EMERGENCY MEDICINE

## 2018-06-04 PROCEDURE — 93010 ELECTROCARDIOGRAM REPORT: CPT

## 2018-06-04 PROCEDURE — 93005 ELECTROCARDIOGRAM TRACING: CPT

## 2018-06-04 PROCEDURE — 96374 THER/PROPH/DIAG INJ IV PUSH: CPT

## 2018-06-04 PROCEDURE — 83690 ASSAY OF LIPASE: CPT | Performed by: EMERGENCY MEDICINE

## 2018-06-04 PROCEDURE — 80053 COMPREHEN METABOLIC PANEL: CPT | Performed by: EMERGENCY MEDICINE

## 2018-06-04 RX ORDER — ONDANSETRON 2 MG/ML
4 INJECTION INTRAMUSCULAR; INTRAVENOUS EVERY 6 HOURS PRN
Status: DISCONTINUED | OUTPATIENT
Start: 2018-06-04 | End: 2018-06-05

## 2018-06-04 RX ORDER — HYDROCODONE BITARTRATE AND ACETAMINOPHEN 5; 325 MG/1; MG/1
1 TABLET ORAL EVERY 4 HOURS PRN
Status: DISCONTINUED | OUTPATIENT
Start: 2018-06-04 | End: 2018-06-05

## 2018-06-04 RX ORDER — HYDROMORPHONE HYDROCHLORIDE 1 MG/ML
0.8 INJECTION, SOLUTION INTRAMUSCULAR; INTRAVENOUS; SUBCUTANEOUS EVERY 2 HOUR PRN
Status: DISCONTINUED | OUTPATIENT
Start: 2018-06-04 | End: 2018-06-05

## 2018-06-04 RX ORDER — SODIUM CHLORIDE 9 MG/ML
INJECTION, SOLUTION INTRAVENOUS CONTINUOUS
Status: CANCELLED | OUTPATIENT
Start: 2018-06-04 | End: 2018-06-04

## 2018-06-04 RX ORDER — SODIUM CHLORIDE 9 MG/ML
INJECTION, SOLUTION INTRAVENOUS CONTINUOUS
Status: DISCONTINUED | OUTPATIENT
Start: 2018-06-04 | End: 2018-06-05

## 2018-06-04 RX ORDER — HYDROMORPHONE HYDROCHLORIDE 1 MG/ML
0.2 INJECTION, SOLUTION INTRAMUSCULAR; INTRAVENOUS; SUBCUTANEOUS EVERY 2 HOUR PRN
Status: DISCONTINUED | OUTPATIENT
Start: 2018-06-04 | End: 2018-06-05

## 2018-06-04 RX ORDER — ACETAMINOPHEN 325 MG/1
650 TABLET ORAL EVERY 4 HOURS PRN
Status: DISCONTINUED | OUTPATIENT
Start: 2018-06-04 | End: 2018-06-05

## 2018-06-04 RX ORDER — METOCLOPRAMIDE HYDROCHLORIDE 5 MG/ML
10 INJECTION INTRAMUSCULAR; INTRAVENOUS EVERY 8 HOURS PRN
Status: DISCONTINUED | OUTPATIENT
Start: 2018-06-04 | End: 2018-06-04

## 2018-06-04 RX ORDER — HYDROMORPHONE HYDROCHLORIDE 1 MG/ML
0.2 INJECTION, SOLUTION INTRAMUSCULAR; INTRAVENOUS; SUBCUTANEOUS EVERY 30 MIN PRN
Status: DISCONTINUED | OUTPATIENT
Start: 2018-06-04 | End: 2018-06-04 | Stop reason: ALTCHOICE

## 2018-06-04 RX ORDER — HYDROMORPHONE HYDROCHLORIDE 1 MG/ML
0.4 INJECTION, SOLUTION INTRAMUSCULAR; INTRAVENOUS; SUBCUTANEOUS EVERY 2 HOUR PRN
Status: DISCONTINUED | OUTPATIENT
Start: 2018-06-04 | End: 2018-06-05

## 2018-06-04 RX ORDER — MULTIVITAMIN WITH FOLIC ACID 400 MCG
1 TABLET ORAL DAILY
COMMUNITY
End: 2021-12-27 | Stop reason: ALTCHOICE

## 2018-06-04 RX ORDER — BISACODYL 10 MG
10 SUPPOSITORY, RECTAL RECTAL
Status: DISCONTINUED | OUTPATIENT
Start: 2018-06-04 | End: 2018-06-05

## 2018-06-04 RX ORDER — HYDROCODONE BITARTRATE AND ACETAMINOPHEN 5; 325 MG/1; MG/1
2 TABLET ORAL EVERY 4 HOURS PRN
Status: DISCONTINUED | OUTPATIENT
Start: 2018-06-04 | End: 2018-06-05

## 2018-06-04 RX ORDER — ONDANSETRON 2 MG/ML
4 INJECTION INTRAMUSCULAR; INTRAVENOUS EVERY 4 HOURS PRN
Status: CANCELLED | OUTPATIENT
Start: 2018-06-04

## 2018-06-04 RX ORDER — METOCLOPRAMIDE HYDROCHLORIDE 5 MG/ML
5 INJECTION INTRAMUSCULAR; INTRAVENOUS EVERY 8 HOURS PRN
Status: DISCONTINUED | OUTPATIENT
Start: 2018-06-04 | End: 2018-06-05

## 2018-06-04 RX ORDER — POLYETHYLENE GLYCOL 3350 17 G/17G
17 POWDER, FOR SOLUTION ORAL DAILY PRN
Status: DISCONTINUED | OUTPATIENT
Start: 2018-06-04 | End: 2018-06-05

## 2018-06-04 RX ORDER — FUROSEMIDE 20 MG/1
20 TABLET ORAL DAILY
COMMUNITY
End: 2019-01-09

## 2018-06-04 RX ORDER — ONDANSETRON 2 MG/ML
4 INJECTION INTRAMUSCULAR; INTRAVENOUS ONCE
Status: COMPLETED | OUTPATIENT
Start: 2018-06-04 | End: 2018-06-04

## 2018-06-04 RX ORDER — HEPARIN SODIUM 5000 [USP'U]/ML
5000 INJECTION, SOLUTION INTRAVENOUS; SUBCUTANEOUS EVERY 12 HOURS SCHEDULED
Status: DISCONTINUED | OUTPATIENT
Start: 2018-06-04 | End: 2018-06-05

## 2018-06-04 RX ORDER — HYDROMORPHONE HYDROCHLORIDE 1 MG/ML
0.2 INJECTION, SOLUTION INTRAMUSCULAR; INTRAVENOUS; SUBCUTANEOUS EVERY 30 MIN PRN
Status: CANCELLED | OUTPATIENT
Start: 2018-06-04 | End: 2018-06-04

## 2018-06-04 NOTE — ED INITIAL ASSESSMENT (HPI)
81 y/o female to ED with c/o of right lower quadrant abdominal pain started yesterday. Daughter reports patient has also been experiencing epigastric pain when eating or drinking for about a week, daughter administered nexium.  Patient and daughter were ins

## 2018-06-04 NOTE — ED PROVIDER NOTES
Patient Seen in: [facility] Emergency Department    History   Patient presents with:  Abdomen/Flank Pain (GI/)    Stated Complaint: abdominal pain, nausea, epigastric pain with eating    HPI    This is a a 80-year-old female who arrives here with 6/2/2014   • T12 compression fracture ([address] 3/21/2013    Daughter reported finding after fall and head injury   • Urinary frequency     Had urology evaluation       Past Surgical History:  No date: CATARACT      Comment: Both eyes  No date: CHOLECYSTECTOMY Surgeon: Cara Devine DO;  Location: [address] [place] [address]  6/20/2014: VIOLA-SEDAJ BY ANNETTE PABLO [facility] 5+ YR N/A      Comment: Procedure: CERVICAL EPIDURAL INJECTION;                 Surgeon: Cara Devine DO;  Location: [facility]  8/2/2016: PATIENT DOCUMENTED NOT TO HAVE EXPERIENCED ANY* N/A      Comment: Procedure: CERVICAL EPIDURAL INJECTION;                 Surgeon: Irene Astorga DO;  Location: [address] [place] [address]  6/20/2014: [facility] (36.3 °C) (Oral)   Resp 20   Ht 152.4 cm (5')   Wt 53 kg   SpO2 95%   BMI 22.82 kg/m²         Physical Exam  General: . Thin  female no respiratory distress. The patient is in no respiratory distress.  The patient is not septic or toxic    HEENT: Atr result                 Please view results for these tests on the individual orders.    RAINBOW DRAW BLUE   RAINBOW DRAW LAVENDER   RAINBOW DRAW LIGHT GREEN   RAINBOW DRAW GOLD   URINE CULTURE, ROUTINE   CBC W/ DIFFERENTIAL              ED Course as of Siddhartha 5/28/2016, 0:20.   INDICATIONS:  abdominal pain, nausea, epigastric pain with eating  TECHNIQUE:  CT scanning was performed from the dome of the diaphragm to the pubic symphysis with non-ionic intravenous contrast material. Post contrast coronal MPR imaging dilatation common bile duct and intrahepatic biliary ducts is stable. Duodenal diverticulum is noted. There is diverticulosis without evidence of acute diverticulitis. 9 x 5 mm calculus in the left side of the bladder is again noted.   Renal collecting s

## 2018-06-05 VITALS
DIASTOLIC BLOOD PRESSURE: 63 MMHG | SYSTOLIC BLOOD PRESSURE: 140 MMHG | OXYGEN SATURATION: 95 % | BODY MASS INDEX: 23.32 KG/M2 | HEART RATE: 91 BPM | TEMPERATURE: 98 F | HEIGHT: 60 IN | WEIGHT: 118.81 LBS | RESPIRATION RATE: 18 BRPM

## 2018-06-05 PROCEDURE — 97161 PT EVAL LOW COMPLEX 20 MIN: CPT

## 2018-06-05 PROCEDURE — 97165 OT EVAL LOW COMPLEX 30 MIN: CPT

## 2018-06-05 PROCEDURE — 80048 BASIC METABOLIC PNL TOTAL CA: CPT | Performed by: HOSPITALIST

## 2018-06-05 PROCEDURE — 97116 GAIT TRAINING THERAPY: CPT

## 2018-06-05 PROCEDURE — 83735 ASSAY OF MAGNESIUM: CPT | Performed by: HOSPITALIST

## 2018-06-05 RX ORDER — PANTOPRAZOLE SODIUM 20 MG/1
20 TABLET, DELAYED RELEASE ORAL
Status: DISCONTINUED | OUTPATIENT
Start: 2018-06-05 | End: 2018-06-05

## 2018-06-05 RX ORDER — POTASSIUM CHLORIDE 20 MEQ/1
40 TABLET, EXTENDED RELEASE ORAL ONCE
Status: COMPLETED | OUTPATIENT
Start: 2018-06-05 | End: 2018-06-05

## 2018-06-05 RX ORDER — FUROSEMIDE 20 MG/1
20 TABLET ORAL DAILY
Status: DISCONTINUED | OUTPATIENT
Start: 2018-06-05 | End: 2018-06-05

## 2018-06-05 NOTE — H&P
[facility] Hospitalist H&P       CC: Patient presents with:  Abdomen/Flank Pain (GI/)       PCP: Amado García MD    History of Present Illness:  Ms. Maru Jay is a 81 yo female with PMH of HTN, osteoprosis who presented to the ED with abdominal pain.   Pain has CERVICAL EPIDURAL;  Surgeon:                Piero Estevez DO;  Location: [facility] FOR               PAIN MANAGEMENT  No date: HERNIA SURGERY      Comment: From gallbladder surgery  1968: HYSTERECTOMY  6/20/2014: INJECTION, W/WO CONTRAST, DX/THERAPEUT Procedure: CERVICAL EPIDURAL INJECTION;                 Surgeon: Jerzy Briggs DO;  Location: [address] [place] [address]  8/2/2016: M-NARESH BY ANNETTE PABLO [facility] 5+ YR N/A      Comment: Procedure: CERVICAL EPIDURAL INJECTION; Surgeon:                Levi Baptiste DO;  Location: [place] [address] MANAGEMENT  10/19/2015: [facility] [place] [facility] FOR IV ANT* N/A      Comment: Procedure: CERVICAL EPIDURAL INJECTION;                 Surgeon: Cassia Kelsey Encounters:  06/05/18 : 118 lb 12.8 oz (53.9 kg)  03/09/18 : 116 lb (52.6 kg)  01/18/18 : 117 lb (53.1 kg)      Wt Readings from Last 6 Encounters:  06/05/18 : 118 lb 12.8 oz (53.9 kg)  03/09/18 : 116 lb (52.6 kg)  01/18/18 : 117 lb (53.1 kg)  01/14/18 : 1 CONCLUSION:  No consolidation.      Dictated by: Cullen Peralta MD on 6/04/2018 at 18:01     Approved by: Cullen Peralta MD            Ct Abdomen Pelvis Iv Contrast, No Oral (er)    Result Date: 6/4/2018  PROCEDURE:  CT ABDOMEN PELVIS IV CONTRAS deformity is again noted. Mild vertebral body height loss of L4 is stable. Bones are osteopenic. LUNG BASES:  Tiny right pleural effusion is noted. Associated atelectasis is identified. OTHER:  Negative.               CONCLUSION:   No evidence of an acut

## 2018-06-05 NOTE — PROGRESS NOTES
Pt was dcd home with instructions and f/u appts all discussed with pts daughter and she verb understanding. IV dcd.

## 2018-06-05 NOTE — PROGRESS NOTES
Pt is A/Ox4. She is Bengali speaking only, daughter is at bedside. Pt on room air lungs sound clear sats >93. No tele . k 3.8 is being replaced. IVF infusing. Pt ambulated with PT with walker and did well, will continue to walk her today.  Hannahy is slig

## 2018-06-05 NOTE — PROGRESS NOTES
[facility] Pharmacy Note:  Renal Dose Adjustment for Metoclopramide (REGLAN)    Robert Man has been prescribed Metoclopramide (REGLAN) 10 mg every 8 hours as needed for nausea. Estimated Creatinine Clearance: 23.6 mL/min (A) (based on SCr of 1.14 mg/dL (H)).

## 2018-06-05 NOTE — PROGRESS NOTES
NURSING ADMISSION NOTE      Patient admitted via Cart  Oriented to room. Safety precautions initiated. Bed in low position. Call light in reach.     Admission navigator complete with assistance from pt's daughter Chacho Bustamante, who will continue to remain

## 2018-06-05 NOTE — OCCUPATIONAL THERAPY NOTE
OCCUPATIONAL THERAPY QUICK EVALUATION - INPATIENT    Room Number: 522/522-A  Evaluation Date: 6/5/2018     Type of Evaluation: Initial and Quick Eval  Presenting Problem: N/V, abdominal pain    Physician Order: IP Consult to Occupational Therapy  Reason fo PAIN MANAGEMENT  No date: HERNIA SURGERY      Comment: From gallbladder surgery  1968: HYSTERECTOMY  6/20/2014: INJECTION, W/WO CONTRAST, DX/THERAPEUTIC SUBST* N/A      Comment: Procedure: CERVICAL EPIDURAL INJECTION;                 Surgeon: Daphne Stokes, [address] [place] [address]  8/2/2016: M-SEDAJ BY ANNETTE PABLO [facility] 5+ YR N/A      Comment: Procedure: CERVICAL EPIDURAL INJECTION;                 Surgeon: Saranya Jean DO;  Location: [address] [place] [address]  6/20/2014: MANAGEMENT  10/19/2015: PATIENT [place] PREOPERATIVE ORDER FOR IV ANT* N/A      Comment: Procedure: CERVICAL EPIDURAL INJECTION;                 Surgeon: Stevenson Gonzalez DO;  Location: [address] [place] [address]  8/2/2016: Diego Lester Putting on and taking off regular upper body clothing?: A Little  -   Taking care of personal grooming such as brushing teeth?: A Little  -   Eating meals?: None    AM-PAC Score:  Score: 17  Approx Degree of Impairment: 50.11%  Standardized Score (AM-PAC S been evaluated and presents with no skilled Occupational Therapy needs at this time. Patient discharged from Occupational Therapy services. Please re-order if a new functional limitation presents during this admission.     Patient was able to achieve the

## 2018-06-05 NOTE — CM/SW NOTE
06/05/18 1100   CM/SW Referral Data   Referral Source    Reason for Referral Discharge planning   Informant Children  (daughter)   Pertinent Medical Hx   Primary Care Physician Name Dr. Khan Offer   Patient Info   Patient's Mental Status Alert

## 2018-06-05 NOTE — PHYSICAL THERAPY NOTE
PHYSICAL THERAPY QUICK EVALUATION - INPATIENT    Room Number: 522/522-A  Evaluation Date: 6/5/2018  Presenting Problem: N/V and abdominal pn  Physician Order: PT Eval and Treat    Problem List  Principal Problem:    Abdominal pain of unknown etiology  Ac [address]  8/14/2014: INJECTION, W/WO CONTRAST, DX/THERAPEUTIC SUBST* N/A      Comment: Procedure: CERVICAL EPIDURAL;  Surgeon:                Joe Watson DO;  Location: [address] [place] [address] Drive MANAGEMENT  2/26/2015: INJECTION, W N/A      Comment: Procedure: CERVICAL EPIDURAL INJECTION;                 Surgeon: Eliseo Simmons DO;  Location: [address] [place] [address]  8/14/2014: PATIENT DOCUMENTED NOT TO HAVE EXPERIENCED ANY* N/A      Comment: Procedure: CERVICAL EP Procedure: CERVICAL EPIDURAL INJECTION;                 Surgeon: Kimberly Bejraano DO;  Location: [address] [address] SITUATION  Type of Home: [address] [place] [address] Street: Ramped entrance; Other (Comment) (elevator)                Lives CMS Modifier (G-Code): CK      FUNCTIONAL ABILITY STATUS  Gait Assessment  Gait Assistance: Minimum assistance; Other (Comment) (per FIM, actual CGA)  Distance (ft): 250  Assistive Device: Rolling walker  Pattern: Shuffle;Comment (very slow pattern )  Sto level   Patient able to ambulate on level surfaces At previous, functional level

## 2018-06-06 NOTE — CM/SW NOTE
Patient discharged on 06/05/2018 as previously planned.        06/06/18 1000   Discharge disposition   Expected discharge disposition Home or [address] [place] [address] after discharge Patient refused services   Discharge transportation Private car
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
## 2018-12-24 PROBLEM — R11.2 NAUSEA AND VOMITING IN ADULT: Status: RESOLVED | Noted: 2018-06-04 | Resolved: 2018-12-24

## 2018-12-24 PROBLEM — S16.1XXA CERVICAL STRAIN, INITIAL ENCOUNTER: Status: RESOLVED | Noted: 2017-03-02 | Resolved: 2018-12-24

## 2018-12-24 PROBLEM — S05.11XA: Status: RESOLVED | Noted: 2017-03-02 | Resolved: 2018-12-24

## 2018-12-24 PROBLEM — S05.11XA CONTUSION OF RIGHT ORBITAL TISSUES: Status: RESOLVED | Noted: 2017-03-02 | Resolved: 2018-12-24

## 2018-12-24 PROBLEM — R55 SYNCOPE AND COLLAPSE: Status: RESOLVED | Noted: 2017-03-02 | Resolved: 2018-12-24

## 2018-12-24 PROBLEM — R10.9 ABDOMINAL PAIN OF UNKNOWN ETIOLOGY: Status: RESOLVED | Noted: 2018-06-04 | Resolved: 2018-12-24

## 2018-12-24 PROBLEM — T07.XXXA MULTIPLE CONTUSIONS: Status: RESOLVED | Noted: 2017-03-02 | Resolved: 2018-12-24

## 2019-01-09 ENCOUNTER — APPOINTMENT (OUTPATIENT)
Dept: GENERAL RADIOLOGY | Facility: HOSPITAL | Age: 84
End: 2019-01-09
Attending: EMERGENCY MEDICINE
Payer: MEDICARE

## 2019-01-09 ENCOUNTER — APPOINTMENT (OUTPATIENT)
Dept: CT IMAGING | Facility: HOSPITAL | Age: 84
End: 2019-01-09
Attending: EMERGENCY MEDICINE
Payer: MEDICARE

## 2019-01-09 ENCOUNTER — HOSPITAL ENCOUNTER (EMERGENCY)
Facility: HOSPITAL | Age: 84
Discharge: HOME | End: 2019-01-09
Attending: EMERGENCY MEDICINE
Payer: MEDICARE

## 2019-01-09 VITALS
OXYGEN SATURATION: 95 % | RESPIRATION RATE: 18 BRPM | HEIGHT: 59 IN | SYSTOLIC BLOOD PRESSURE: 142 MMHG | DIASTOLIC BLOOD PRESSURE: 57 MMHG | BODY MASS INDEX: 23.39 KG/M2 | TEMPERATURE: 98 F | WEIGHT: 116 LBS | HEART RATE: 84 BPM

## 2019-01-09 DIAGNOSIS — T07.XXXA MULTIPLE CONTUSIONS: Primary | ICD-10-CM

## 2019-01-09 DIAGNOSIS — E86.0 DEHYDRATION: ICD-10-CM

## 2019-01-09 DIAGNOSIS — S30.1XXA CONTUSION OF ABDOMINAL WALL, INITIAL ENCOUNTER: ICD-10-CM

## 2019-01-09 DIAGNOSIS — S09.90XA INJURY OF HEAD, INITIAL ENCOUNTER: ICD-10-CM
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
## 2019-01-09 LAB
ALBUMIN SERPL-MCNC: 3.4 G/DL (ref 3.1–4.5)
ALBUMIN/GLOB SERPL: 0.8 {RATIO} (ref 1–2)
ALP LIVER SERPL-CCNC: 67 U/L (ref 55–142)
ALT SERPL-CCNC: 21 U/L (ref 14–54)
ANION GAP SERPL CALC-SCNC: 7 MMOL/L (ref 0–18)
AST SERPL-CCNC: 25 U/L (ref 15–41)
BASOPHILS # BLD AUTO: 0.04 X10(3) UL (ref 0–0.1)
BASOPHILS NFR BLD AUTO: 0.6 %
BILIRUB SERPL-MCNC: 0.3 MG/DL (ref 0.1–2)
BILIRUB UR QL STRIP.AUTO: NEGATIVE
BUN BLD-MCNC: 27 MG/DL (ref 8–20)
BUN/CREAT SERPL: 19.9 (ref 10–20)
CALCIUM BLD-MCNC: 9.2 MG/DL (ref 8.3–10.3)
CHLORIDE SERPL-SCNC: 108 MMOL/L (ref 101–111)
CLARITY UR REFRACT.AUTO: CLEAR
CO2 SERPL-SCNC: 26 MMOL/L (ref 22–32)
CREAT BLD-MCNC: 1.36 MG/DL (ref 0.55–1.02)
EOSINOPHIL # BLD AUTO: 0.14 X10(3) UL (ref 0–0.3)
EOSINOPHIL NFR BLD AUTO: 2.1 %
ERYTHROCYTE [DISTWIDTH] IN BLOOD BY AUTOMATED COUNT: 13.9 % (ref 11.5–16)
GLOBULIN PLAS-MCNC: 4.4 G/DL (ref 2.8–4.4)
GLUCOSE BLD-MCNC: 138 MG/DL (ref 70–99)
GLUCOSE UR STRIP.AUTO-MCNC: NEGATIVE MG/DL
HCT VFR BLD AUTO: 38.6 % (ref 34–50)
HGB BLD-MCNC: 12.7 G/DL (ref 12–16)
IMMATURE GRANULOCYTE COUNT: 0.04 X10(3) UL (ref 0–1)
IMMATURE GRANULOCYTE RATIO %: 0.6 %
KETONES UR STRIP.AUTO-MCNC: NEGATIVE MG/DL
LEUKOCYTE ESTERASE UR QL STRIP.AUTO: NEGATIVE
LIPASE: 145 U/L (ref 73–393)
LYMPHOCYTES # BLD AUTO: 1.08 X10(3) UL (ref 0.9–4)
LYMPHOCYTES NFR BLD AUTO: 16.4 %
M PROTEIN MFR SERPL ELPH: 7.8 G/DL (ref 6.4–8.2)
MCH RBC QN AUTO: 30.7 PG (ref 27–33.2)
MCHC RBC AUTO-ENTMCNC: 32.9 G/DL (ref 31–37)
MCV RBC AUTO: 93.2 FL (ref 81–100)
MONOCYTES # BLD AUTO: 0.62 X10(3) UL (ref 0.1–1)
MONOCYTES NFR BLD AUTO: 9.4 %
NEUTROPHIL ABS PRELIM: 4.65 X10 (3) UL (ref 1.3–6.7)
NEUTROPHILS # BLD AUTO: 4.65 X10(3) UL (ref 1.3–6.7)
NEUTROPHILS NFR BLD AUTO: 70.9 %
NITRITE UR QL STRIP.AUTO: NEGATIVE
OSMOLALITY SERPL CALC.SUM OF ELEC: 299 MOSM/KG (ref 275–295)
PH UR STRIP.AUTO: 7 [PH] (ref 4.5–8)
PLATELET # BLD AUTO: 269 10(3)UL (ref 150–450)
POTASSIUM SERPL-SCNC: 4 MMOL/L (ref 3.6–5.1)
PROT UR STRIP.AUTO-MCNC: NEGATIVE MG/DL
RBC # BLD AUTO: 4.14 X10(6)UL (ref 3.8–5.1)
RBC UR QL AUTO: NEGATIVE
RED CELL DISTRIBUTION WIDTH-SD: 46.9 FL (ref 35.1–46.3)
SODIUM SERPL-SCNC: 141 MMOL/L (ref 136–144)
SP GR UR STRIP.AUTO: 1.01 (ref 1–1.03)
TROPONIN I SERPL-MCNC: <0.046 NG/ML (ref ?–0.05)
UROBILINOGEN UR STRIP.AUTO-MCNC: <2 MG/DL
WBC # BLD AUTO: 6.6 X10(3) UL (ref 4–13)

## 2019-01-09 PROCEDURE — 99285 EMERGENCY DEPT VISIT HI MDM: CPT

## 2019-01-09 PROCEDURE — 96361 HYDRATE IV INFUSION ADD-ON: CPT

## 2019-01-09 PROCEDURE — 70450 CT HEAD/BRAIN W/O DYE: CPT | Performed by: EMERGENCY MEDICINE

## 2019-01-09 PROCEDURE — 73590 X-RAY EXAM OF LOWER LEG: CPT | Performed by: EMERGENCY MEDICINE

## 2019-01-09 PROCEDURE — 96360 HYDRATION IV INFUSION INIT: CPT

## 2019-01-09 PROCEDURE — 74177 CT ABD & PELVIS W/CONTRAST: CPT | Performed by: EMERGENCY MEDICINE

## 2019-01-09 PROCEDURE — 72125 CT NECK SPINE W/O DYE: CPT | Performed by: EMERGENCY MEDICINE

## 2019-01-09 PROCEDURE — 93005 ELECTROCARDIOGRAM TRACING: CPT

## 2019-01-09 PROCEDURE — 71046 X-RAY EXAM CHEST 2 VIEWS: CPT | Performed by: EMERGENCY MEDICINE

## 2019-01-09 PROCEDURE — 85025 COMPLETE CBC W/AUTO DIFF WBC: CPT | Performed by: EMERGENCY MEDICINE

## 2019-01-09 PROCEDURE — 83690 ASSAY OF LIPASE: CPT | Performed by: EMERGENCY MEDICINE

## 2019-01-09 PROCEDURE — 80053 COMPREHEN METABOLIC PANEL: CPT | Performed by: EMERGENCY MEDICINE

## 2019-01-09 PROCEDURE — 84484 ASSAY OF TROPONIN QUANT: CPT | Performed by: EMERGENCY MEDICINE

## 2019-01-09 PROCEDURE — 93010 ELECTROCARDIOGRAM REPORT: CPT

## 2019-01-09 PROCEDURE — 81003 URINALYSIS AUTO W/O SCOPE: CPT | Performed by: EMERGENCY MEDICINE

## 2019-01-09 RX ORDER — SODIUM CHLORIDE 9 MG/ML
125 INJECTION, SOLUTION INTRAVENOUS CONTINUOUS
Status: DISCONTINUED | OUTPATIENT
Start: 2019-01-09 | End: 2019-01-09

## 2019-01-09 NOTE — ED PROVIDER NOTES
Patient Seen in: [facility] Emergency Department    History   Patient presents with:  Head Neck Injury (neurologic, musculoskeletal)  Fall (musculoskeletal, neurologic)  Contusion (musculoskeletal)    Stated Complaint: Patient fell in bathroom, hit urology evaluation       Past Surgical History:   Procedure Laterality Date   • CATARACT      Both eyes   • CERVICAL EPIDURAL N/A 2/26/2015    Performed by Oscar Roberts DO at [address] [facility] N/A 8/14/2014    Perfor scalp also tender in the left lateral aspect of her neck. , conjunctiva are not pale. There is no icterus. Oral mucosa Is wet. No facial trauma. The neck is supple. LUNGS: Clear to auscultation, there is no wheezing or retraction. No crackles.     CV Please view results for these tests on the individual orders. URINALYSIS WITH CULTURE REFLEX   RAINBOW DRAW BLUE   RAINBOW DRAW GOLD   RAINBOW DRAW LAVENDER   RAINBOW DRAW LIGHT GREEN     The EKG shows normal sinus rhythm.   There is no acute ST eleva swelling. EFFUSION:  None visible. OTHER:  Negative. CONCLUSION:  No acute fracture.     Dictated by: Swati Toscano MD on 1/09/2019 at 21:23     Approved by: Swati Toscano MD            Xr Tibia + Fibula (2 Views), Right (cpt=[id number])    Result sinuses show no significant sinus disease. No evidence of depressed skull fracture. CONCLUSION: 1. No acute intracranial findings 2. Cerebral atrophy with small vessel changes.     Dictated by: Malachi Holstein, MD on 1/09/2019 at 19:25     Approved by: complex without canal stenosis. Mild foraminal stenosis bilaterally. C7-T1:  No significant disc/facet abnormality, spinal stenosis, or foraminal stenosis. CONCLUSION:  Stable multiple level degenerative disc disease without fracture.     Dictated by: BONES:  Minimal anterolisthesis of L5 on S1. Chronic wedging deformity of the T12 vertebral body. Degenerative changes. CONCLUSION:  1. No acute traumatic findings.  2.  Mild wall thickening of the 2nd/3rd portion of the duodenum which may represent

## 2019-01-10 LAB
ATRIAL RATE: 97 BPM
P AXIS: 42 DEGREES
P-R INTERVAL: 202 MS
Q-T INTERVAL: 376 MS
QRS DURATION: 70 MS
QTC CALCULATION (BEZET): 477 MS
R AXIS: -17 DEGREES
T AXIS: 15 DEGREES
VENTRICULAR RATE: 97 BPM

## 2019-04-23 ENCOUNTER — HOSPITAL ENCOUNTER (EMERGENCY)
Facility: HOSPITAL | Age: 84
Discharge: HOME | End: 2019-04-23
Attending: EMERGENCY MEDICINE
Payer: MEDICARE

## 2019-04-23 ENCOUNTER — APPOINTMENT (OUTPATIENT)
Dept: CT IMAGING | Facility: HOSPITAL | Age: 84
End: 2019-04-23
Attending: EMERGENCY MEDICINE
Payer: MEDICARE

## 2019-04-23 VITALS
HEART RATE: 88 BPM | TEMPERATURE: 99 F | RESPIRATION RATE: 14 BRPM | SYSTOLIC BLOOD PRESSURE: 123 MMHG | DIASTOLIC BLOOD PRESSURE: 68 MMHG | BODY MASS INDEX: 24 KG/M2 | OXYGEN SATURATION: 96 % | WEIGHT: 113 LBS

## 2019-04-23 DIAGNOSIS — R51.9 ACUTE NONINTRACTABLE HEADACHE, UNSPECIFIED HEADACHE TYPE: Primary | ICD-10-CM

## 2019-04-23 PROCEDURE — 85025 COMPLETE CBC W/AUTO DIFF WBC: CPT | Performed by: EMERGENCY MEDICINE

## 2019-04-23 PROCEDURE — 93010 ELECTROCARDIOGRAM REPORT: CPT | Performed by: EMERGENCY MEDICINE

## 2019-04-23 PROCEDURE — 80053 COMPREHEN METABOLIC PANEL: CPT | Performed by: EMERGENCY MEDICINE

## 2019-04-23 PROCEDURE — 93005 ELECTROCARDIOGRAM TRACING: CPT

## 2019-04-23 PROCEDURE — 36415 COLL VENOUS BLD VENIPUNCTURE: CPT | Performed by: EMERGENCY MEDICINE

## 2019-04-23 PROCEDURE — 70450 CT HEAD/BRAIN W/O DYE: CPT | Performed by: EMERGENCY MEDICINE

## 2019-04-23 PROCEDURE — 99285 EMERGENCY DEPT VISIT HI MDM: CPT | Performed by: EMERGENCY MEDICINE

## 2019-04-23 PROCEDURE — 84484 ASSAY OF TROPONIN QUANT: CPT | Performed by: EMERGENCY MEDICINE

## 2019-04-23 RX ORDER — ACETAMINOPHEN 500 MG
1000 TABLET ORAL ONCE
Status: COMPLETED | OUTPATIENT
Start: 2019-04-23 | End: 2019-04-23

## 2019-04-24 NOTE — ED PROVIDER NOTES
Patient Seen in: [facility] Emergency Department    History   Patient presents with:  Hypertension (cardiovascular)  Headache (neurologic)    Stated Complaint: headache, elevated bp today    HPI    The patient is a 80-year-old [place] speaking fe MANAGEMENT   • CERVICAL EPIDURAL INJECTION N/A 3/30/2017    Performed by Danisha Geiger DO at [address] N/A 8/2/2016    Performed by Danisha Geiger DO at [address] [facility] Speaking full sentences without any distress or retractions. Clear to auscultation bilaterally no wheezes or rales or rhonchi. Abdomen: Normoactive bowel sounds. Soft, nondistended. No HSM or masses.  Nontender throughout abdomen to superficial and deep pa inversions. This is a normal EKG. On arrival of the patient an EKG was obtained which showed no acute process. The patient was placed on continuous pulse oximetry and cardiac telemetry.   Blood was obtained and peripheral IV access was establ return if symptoms worsen otherwise follow-up with primary care. [place] comfortable this plan, discharged home in stable condition.             Disposition and Plan     Clinical Impression:  Acute nonintractable headache, unspecified headache type  (primary

## 2019-06-24 PROCEDURE — 87046 STOOL CULTR AEROBIC BACT EA: CPT | Performed by: INTERNAL MEDICINE

## 2019-06-24 PROCEDURE — 87272 CRYPTOSPORIDIUM AG IF: CPT | Performed by: INTERNAL MEDICINE

## 2019-06-24 PROCEDURE — 87045 FECES CULTURE AEROBIC BACT: CPT | Performed by: INTERNAL MEDICINE

## 2019-06-24 PROCEDURE — 87329 GIARDIA AG IA: CPT | Performed by: INTERNAL MEDICINE

## 2019-06-24 PROCEDURE — 87427 SHIGA-LIKE TOXIN AG IA: CPT | Performed by: INTERNAL MEDICINE

## 2020-08-07 PROBLEM — N18.30 CKD (CHRONIC KIDNEY DISEASE) STAGE 3, GFR 30-59 ML/MIN (HCC): Status: ACTIVE | Noted: 2020-08-07

## 2021-08-15 PROBLEM — Q89.2: Status: ACTIVE | Noted: 2021-08-15

## 2021-08-15 PROBLEM — I65.23 BILATERAL CAROTID ARTERY STENOSIS: Status: ACTIVE | Noted: 2021-08-15

## 2022-01-09 ENCOUNTER — APPOINTMENT (OUTPATIENT)
Dept: CT IMAGING | Facility: HOSPITAL | Age: 87
DRG: 871 | End: 2022-01-09
Payer: MEDICARE

## 2022-01-09 ENCOUNTER — APPOINTMENT (OUTPATIENT)
Dept: GENERAL RADIOLOGY | Facility: HOSPITAL | Age: 87
DRG: 871 | End: 2022-01-09
Attending: Other
Payer: MEDICARE

## 2022-01-09 ENCOUNTER — APPOINTMENT (OUTPATIENT)
Dept: CT IMAGING | Facility: HOSPITAL | Age: 87
DRG: 871 | End: 2022-01-09
Attending: EMERGENCY MEDICINE
Payer: MEDICARE

## 2022-01-09 ENCOUNTER — HOSPITAL ENCOUNTER (INPATIENT)
Facility: HOSPITAL | Age: 87
LOS: 15 days | Discharge: HOME HEALTH | DRG: 871 | End: 2022-01-24
Attending: EMERGENCY MEDICINE | Admitting: INTERNAL MEDICINE
Payer: MEDICARE

## 2022-01-09 DIAGNOSIS — E87.2 ACIDOSIS: ICD-10-CM

## 2022-01-09 DIAGNOSIS — N17.9 AKI (ACUTE KIDNEY INJURY) (HCC): ICD-10-CM

## 2022-01-09 DIAGNOSIS — I60.9 SUBARACHNOID HEMORRHAGE (HCC): Primary | ICD-10-CM

## 2022-01-09 DIAGNOSIS — R73.9 HYPERGLYCEMIA: ICD-10-CM

## 2022-01-09 DIAGNOSIS — A41.9 SEPSIS, DUE TO UNSPECIFIED ORGANISM, UNSPECIFIED WHETHER ACUTE ORGAN DYSFUNCTION PRESENT (HCC): ICD-10-CM

## 2022-01-09 DIAGNOSIS — N39.0 URINARY TRACT INFECTION WITHOUT HEMATURIA, SITE UNSPECIFIED: ICD-10-CM

## 2022-01-09 PROBLEM — D72.829 LEUKOCYTOSIS: Status: ACTIVE | Noted: 2022-01-09

## 2022-01-09 LAB
ALBUMIN SERPL-MCNC: 2.9 G/DL (ref 3.4–5)
ALBUMIN/GLOB SERPL: 0.7 {RATIO} (ref 1–2)
ALP LIVER SERPL-CCNC: 58 U/L
ALT SERPL-CCNC: 22 U/L
ANION GAP SERPL CALC-SCNC: 15 MMOL/L (ref 0–18)
ANTIBODY SCREEN: NEGATIVE
APTT PPP: 29.1 SECONDS (ref 23.3–35.6)
AST SERPL-CCNC: 25 U/L (ref 15–37)
ATRIAL RATE: 106 BPM
BASE EXCESS BLDV CALC-SCNC: -8 MMOL/L
BASOPHILS # BLD AUTO: 0.07 X10(3) UL (ref 0–0.2)
BASOPHILS NFR BLD AUTO: 0.5 %
BILIRUB SERPL-MCNC: 0.2 MG/DL (ref 0.1–2)
BILIRUB UR QL STRIP.AUTO: NEGATIVE
BUN BLD-MCNC: 30 MG/DL (ref 7–18)
CALCIUM BLD-MCNC: 9.6 MG/DL (ref 8.5–10.1)
CHLORIDE SERPL-SCNC: 109 MMOL/L (ref 98–112)
CO2 SERPL-SCNC: 12 MMOL/L (ref 21–32)
COLOR UR AUTO: YELLOW
CREAT BLD-MCNC: 1.77 MG/DL
EOSINOPHIL # BLD AUTO: 0.22 X10(3) UL (ref 0–0.7)
EOSINOPHIL NFR BLD AUTO: 1.5 %
ERYTHROCYTE [DISTWIDTH] IN BLOOD BY AUTOMATED COUNT: 13.4 %
EST. AVERAGE GLUCOSE BLD GHB EST-MCNC: 120 MG/DL (ref 68–126)
GLOBULIN PLAS-MCNC: 4.2 G/DL (ref 2.8–4.4)
GLUCOSE BLD-MCNC: 105 MG/DL (ref 70–99)
GLUCOSE BLD-MCNC: 261 MG/DL (ref 70–99)
GLUCOSE BLD-MCNC: 327 MG/DL (ref 70–99)
GLUCOSE BLD-MCNC: 98 MG/DL (ref 70–99)
GLUCOSE UR STRIP.AUTO-MCNC: 150 MG/DL
HBA1C MFR BLD: 5.8 % (ref ?–5.7)
HCO3 BLDV-SCNC: 19 MEQ/L (ref 23–27)
HCT VFR BLD AUTO: 38.8 %
HGB BLD-MCNC: 12.1 G/DL
HYALINE CASTS #/AREA URNS AUTO: PRESENT /LPF
IMM GRANULOCYTES # BLD AUTO: 0.25 X10(3) UL (ref 0–1)
IMM GRANULOCYTES NFR BLD: 1.7 %
INR BLD: 1.19 (ref 0.8–1.2)
KETONES UR STRIP.AUTO-MCNC: NEGATIVE MG/DL
LACTATE SERPL-SCNC: 3.1 MMOL/L (ref 0.4–2)
LACTATE SERPL-SCNC: 3.8 MMOL/L (ref 0.4–2)
LACTATE SERPL-SCNC: 7.2 MMOL/L (ref 0.4–2)
LYMPHOCYTES # BLD AUTO: 2.16 X10(3) UL (ref 1–4)
LYMPHOCYTES NFR BLD AUTO: 14.9 %
MCH RBC QN AUTO: 31.3 PG (ref 26–34)
MCHC RBC AUTO-ENTMCNC: 31.2 G/DL (ref 31–37)
MCV RBC AUTO: 100.3 FL
MONOCYTES # BLD AUTO: 0.74 X10(3) UL (ref 0.1–1)
MONOCYTES NFR BLD AUTO: 5.1 %
NEUTROPHILS # BLD AUTO: 11.06 X10 (3) UL (ref 1.5–7.7)
NEUTROPHILS # BLD AUTO: 11.06 X10(3) UL (ref 1.5–7.7)
NEUTROPHILS NFR BLD AUTO: 76.3 %
NITRITE UR QL STRIP.AUTO: NEGATIVE
OSMOLALITY SERPL CALC.SUM OF ELEC: 301 MOSM/KG (ref 275–295)
P AXIS: 71 DEGREES
P-R INTERVAL: 178 MS
PCO2 BLDV: 47 MM HG (ref 38–50)
PH BLDV: 7.23 [PH] (ref 7.33–7.43)
PH UR STRIP.AUTO: 6 [PH] (ref 5–8)
PLATELET # BLD AUTO: 250 10(3)UL (ref 150–450)
PO2 BLDV: 34 MM HG (ref 30–50)
POTASSIUM SERPL-SCNC: 4.2 MMOL/L (ref 3.5–5.1)
PROT SERPL-MCNC: 7.1 G/DL (ref 6.4–8.2)
PROT UR STRIP.AUTO-MCNC: 100 MG/DL
PROTHROMBIN TIME: 15.2 SECONDS (ref 11.6–14.8)
Q-T INTERVAL: 354 MS
QRS DURATION: 84 MS
QTC CALCULATION (BEZET): 470 MS
R AXIS: 42 DEGREES
RBC # BLD AUTO: 3.87 X10(6)UL
RBC #/AREA URNS AUTO: >10 /HPF
RH BLOOD TYPE: POSITIVE
SAO2 % BLDV: 43 % (ref 72–78)
SAO2 % BLDV: 51 % (ref 72–78)
SARS-COV-2 RNA RESP QL NAA+PROBE: NOT DETECTED
SODIUM SERPL-SCNC: 136 MMOL/L (ref 136–145)
SP GR UR STRIP.AUTO: 1.01 (ref 1–1.03)
T AXIS: 81 DEGREES
UROBILINOGEN UR STRIP.AUTO-MCNC: <2 MG/DL
VENTRICULAR RATE: 106 BPM
WBC # BLD AUTO: 14.5 X10(3) UL (ref 4–11)
WBC #/AREA URNS AUTO: >50 /HPF
WBC CLUMPS UR QL AUTO: PRESENT /HPF

## 2022-01-09 PROCEDURE — 99291 CRITICAL CARE FIRST HOUR: CPT | Performed by: OTHER

## 2022-01-09 PROCEDURE — 71045 X-RAY EXAM CHEST 1 VIEW: CPT | Performed by: OTHER

## 2022-01-09 PROCEDURE — 70450 CT HEAD/BRAIN W/O DYE: CPT | Performed by: EMERGENCY MEDICINE

## 2022-01-09 PROCEDURE — 0HQ0XZZ REPAIR SCALP SKIN, EXTERNAL APPROACH: ICD-10-PCS | Performed by: EMERGENCY MEDICINE

## 2022-01-09 PROCEDURE — 72125 CT NECK SPINE W/O DYE: CPT | Performed by: EMERGENCY MEDICINE

## 2022-01-09 PROCEDURE — 06HY33Z INSERTION OF INFUSION DEVICE INTO LOWER VEIN, PERCUTANEOUS APPROACH: ICD-10-PCS | Performed by: EMERGENCY MEDICINE

## 2022-01-09 RX ORDER — ACETAMINOPHEN 650 MG/1
650 SUPPOSITORY RECTAL EVERY 4 HOURS PRN
Status: DISCONTINUED | OUTPATIENT
Start: 2022-01-09 | End: 2022-01-24

## 2022-01-09 RX ORDER — LEVETIRACETAM 500 MG/5ML
250 INJECTION, SOLUTION, CONCENTRATE INTRAVENOUS EVERY 12 HOURS SCHEDULED
Status: DISPENSED | OUTPATIENT
Start: 2022-01-09 | End: 2022-01-16

## 2022-01-09 RX ORDER — ONDANSETRON 2 MG/ML
4 INJECTION INTRAMUSCULAR; INTRAVENOUS EVERY 6 HOURS PRN
Status: DISCONTINUED | OUTPATIENT
Start: 2022-01-09 | End: 2022-01-24

## 2022-01-09 RX ORDER — LORAZEPAM 2 MG/ML
1 INJECTION INTRAMUSCULAR
Status: DISCONTINUED | OUTPATIENT
Start: 2022-01-09 | End: 2022-01-24

## 2022-01-09 RX ORDER — SODIUM CHLORIDE 9 MG/ML
INJECTION, SOLUTION INTRAVENOUS CONTINUOUS
Status: DISCONTINUED | OUTPATIENT
Start: 2022-01-09 | End: 2022-01-11

## 2022-01-09 RX ORDER — LABETALOL HYDROCHLORIDE 5 MG/ML
10 INJECTION, SOLUTION INTRAVENOUS EVERY 10 MIN PRN
Status: COMPLETED | OUTPATIENT
Start: 2022-01-09 | End: 2022-01-11

## 2022-01-09 RX ORDER — METOCLOPRAMIDE HYDROCHLORIDE 5 MG/ML
5 INJECTION INTRAMUSCULAR; INTRAVENOUS EVERY 8 HOURS PRN
Status: DISCONTINUED | OUTPATIENT
Start: 2022-01-09 | End: 2022-01-24

## 2022-01-09 RX ORDER — ACETAMINOPHEN 325 MG/1
650 TABLET ORAL EVERY 4 HOURS PRN
Status: DISCONTINUED | OUTPATIENT
Start: 2022-01-09 | End: 2022-01-24

## 2022-01-09 RX ORDER — HYDRALAZINE HYDROCHLORIDE 20 MG/ML
10 INJECTION INTRAMUSCULAR; INTRAVENOUS EVERY 2 HOUR PRN
Status: DISCONTINUED | OUTPATIENT
Start: 2022-01-09 | End: 2022-01-13

## 2022-01-09 NOTE — ED QUICK NOTES
Pt back from CT without incident. Pt still not opening eyes but noted to be moving legs in CT and moving hands. Pt noted to wince to pain but not following any commands. During CT, C spine maintained despite not having collar on.

## 2022-01-09 NOTE — ED QUICK NOTES
Pt repositioned on cart for comfort and no active bleeding noted from head at this time. Family in waiting room, will bring back shortly.

## 2022-01-09 NOTE — ED QUICK NOTES
Dr. Hina Hernandez at bedside placing central line. Pt still with eyes closed, moaning to pain and moving all extremities. Blood pressure improved, pt appears more responsive.

## 2022-01-09 NOTE — ED QUICK NOTES
22 g IV placed to left forearm by Andres Mcduffie RN. Fluids switched to IV to left arm. Although right arm continues to infuse, right arm does appear slightly edematous and concern IV may have infiltrated.

## 2022-01-09 NOTE — ED INITIAL ASSESSMENT (HPI)
Pt presents to the ED via ems from home with complaints of fall. Pt lives with family and was found on floor, last seen at 9pm last night. Per ems, pt appeared to have hit head on baseboard.  Per ems, pt has only been moaning, speaks [place] only, skin c

## 2022-01-09 NOTE — PLAN OF CARE
Received patient from ED at 1300. Patient Italian speaking only, daughter at bedside translating. Per daughter patient is alert X 2-3. At baseline patient can be confused, or not follow/answer commands and has some dementia. URIOSTEGUI, lower leg weakness BL. neurological status  - Encourage and assist patient to increase activity and self care with guidance from PT/OT  - Encourage visually impaired, hearing impaired and aphasic patients to use assistive/communication devices  Outcome: Progressing     Problem:

## 2022-01-09 NOTE — CONSULTS
[address]   NEUROCRITICAL CARE   CONSULT NOTE    Admission date: 1/9/2022  Reason for Consult: [facility]  Chief Complaint: Patient presents with:  Fall  Head Neck Injury  ________________________________________________________________    Harper Herring Past Surgical History:   Procedure Laterality Date   • CATARACT      Both eyes   • CHOLECYSTECTOMY     • FLUOR GID & Faustine Apgar NDL/CATH SPI DX/THER NJX N/A 2014    Procedure: CERVICAL EPIDURAL INJECTION;  Surgeon: Robbin Becker DO;  Location: [facility]   • M-SEDAJ BY  PHYS [address] [facility] 59  N SVC 5+ YR N/A 8/14/2014    Procedure: CERVICAL EPIDURAL;  Surgeon: Jane Cook DO;  Location: [facility] FOR PAIN MANAGEMENT   • M-SEDAJ BY  PHYS [address] [facility] 59  N SVC 5+ YR N/A 2/26/2015    Procedure: CERVICAL EPID Surgeon: Eddie Romero DO;  Location: [address] [place] [address]   • PATIENT [address] FOR IV ANTIBIOTIC SURGICAL SITE INFECTION PROPHYLAXIS.  N/A 8/14/2014    Procedure: CERVICAL EPIDURAL;  Surgeon: Eddie Romero DO;  [facility]    INTAKE/OUTPUT:  Intake/Output Summary (Last 24 hours) at 1/9/2022 1346  Last data filed at 1/9/2022 1226  Gross per 24 hour   Intake 2600 ml   Output —   Net 2600 ml     PHYSICAL EXAM:  GENERAL APPEARANCE: Patient resting comfortably in bed, NAD discussed with Dr. Gonzalez Mane at [facility] 10 hours on 1/9/2022. Critical results were read back.    Dictated by (CST): Kimberly Quintana MD on 1/09/2022 at 9:18 AM     Finalized by (CST): Kimberly Quintana MD on 1/09/2022 at 9:22 AM       CT SPINE CERVICAL (CPT=72125)    R bolus   - Trend lactic acid, follow-up on cultures    Heme - Hb goal > 7, continue to monitor daily     Endocrine:  New dx DM Type II, uncontrolled   - HbA1c pending   - Accuchecks q6 with SSI prn    - Insulin gtt protocol if unable to control     Checklis

## 2022-01-09 NOTE — ED QUICK NOTES
At this time, wound to left occiput irrigated with saline and sutured due to continued bleeding. Pt tolerated well, however is noted to now move upper extremities toward  Pain. Pt moving both arms toward head, moaning intermittently.

## 2022-01-09 NOTE — ED PROVIDER NOTES
Patient Seen in: [facility] Emergency Department      History   Patient presents with:  Fall  Head Neck Injury    Stated Complaint: fall, found on floor    Subjective:   HPI    80year-old woman, history of hypertension, here for evaluation after fa Procedure: CERVICAL EPIDURAL INJECTION;  Surgeon: Jayme Rivera DO;  Location: [address] [place] [address]   • INJECTION, W/WO CONTRAST, DX/THERAPEUTIC SUBSTANCE, EPIDURAL/SUBARACHNOID; CERVICAL/THORACIC N/A 8/14/2014    Procedure: CERVICAL EPIDURAL;  [address] PATIENT DOCUMENTED NOT TO HAVE EXPERIENCED ANY OF THE FOLLOWING EVENTS N/A 6/20/2014    Procedure: CERVICAL EPIDURAL INJECTION;  Surgeon: Miguel Parisi DO;  Location: [address] [place] [address]   • PATIENT DOCUMENTED NOT TO HAVE EXPERIENCED ANY OF THE [address] PROPHYLAXIS.  N/A 8/2/2016    Procedure: CERVICAL EPIDURAL INJECTION;  Surgeon: Milo Valverde DO;  Location: [address] [place] [address]                Social History    Tobacco Use      Smoking status: Never Smoker      Smokeless tobacco: Never Used    Al Albumin 2.9 (*)     A/G Ratio 0.7 (*)     All other components within normal limits   URINALYSIS WITH CULTURE REFLEX - Abnormal; Notable for the following components:    Clarity Urine Cloudy (*)     Glucose Urine 150  (*)     Blood Urine Moderate (*)     P DIFFERENTIAL WITH PLATELET    Narrative: The following orders were created for panel order CBC With Differential With Platelet.   Procedure                               Abnormality         Status                     --------- parenchymal volume loss without overt hydrocephalus. There is no midline shift or mass-effect. The basal cisterns are patent. The gray-white matter differentiation is intact. Large left occipital scalp laceration/hematoma.   Small amount of subarachnoid the ACR (American College of Radiology) NRDR ([address]) which includes the Dose Index Registry.   PATIENT STATED HISTORY: (As transcribed by Technologist)  FALL TRAUMA   FINDINGS:  Patient motion limits the exam.  No acute fracture o additional history at this time. FINDINGS:  Cardiomegaly with normal pulmonary vascularity. Minimal scarring/atelectasis in the lower lungs. Calcified plaque in the thoracic aorta. No pleural effusion or pneumothorax.   Surgical clips in the upper abd minutes  Due to a high probability of clinically significant, life threatening deterioration, the patient required my highest level of preparedness to intervene emergently and I personally spent this critical care time directly and personally managing the unspecified  VERENICE (acute kidney injury) [facility])     Disposition:  Admit  1/9/2022  9:47 am    Follow-up:  No follow-up provider specified.         Medications Prescribed:  Current Discharge Medication List                          Hospital Problems

## 2022-01-09 NOTE — PROGRESS NOTES
[facility]  Antibiotic Dosing Consult         Sachi Haven Villasenor is a 80year old female for whom pharmacy was consulted to dose piperacillin/tazobactam for treatment of suspected urosepsis by Dr. Bee Echavarria.    Other antimicrobials: n/a    Allergies: is

## 2022-01-09 NOTE — ED QUICK NOTES
Discussed pt status with Dr. Selene Mckeon, will evaluate wound to back of head shortly with physician but using caution with movement of pt at this time.

## 2022-01-09 NOTE — ED QUICK NOTES
Pt tolerated straight cath procedure but very strong at attempting to stop staff.  Pt moving both arms well and eyes open, remains non verbal at this time

## 2022-01-09 NOTE — PROGRESS NOTES
01/09/22 1133   Clinical Encounter Type   Visited With Patient; Family   Routine Visit Introduction   Continue Visiting No   Crisis Visit Patient actively dying   Referral From Nurse;Family   Episcopal Encounters   Spiritual Requests During Visit / Rahel Vargas

## 2022-01-09 NOTE — ED QUICK NOTES
Pt noted to have blood on chux pad, appears that pt has had more bleeding from head since upon arrival. To monitor.

## 2022-01-09 NOTE — CONSULTS
[facility]    Roldan Jean-Pierreks Patient Status:  Inpatient    1928 MRN [id number]   Location 6600/6600-A PCP Jocelyn Tolliver MD     Date of Admission: 2022    History of Present Illness: Pt is a 80year old female consu HERNIA SURGERY      From gallbladder surgery   • HYSTERECTOMY  1968   • INJECTION, W/WO CONTRAST, DX/THERAPEUTIC SUBSTANCE, EPIDURAL/SUBARACHNOID; CERVICAL/THORACIC N/A 6/20/2014    Procedure: CERVICAL EPIDURAL INJECTION;  Surgeon: NATALIA Alejandro  L R, DO;  Location: [address] [place] [address]   • OTHER SURGICAL HISTORY N/A 5/29/2016    Procedure: EXPLORATORY LAPAROTOMY;  Surgeon: Alexandria Ellis MD;  Location: [address] [place] [address]   • PATIENT DOCUMENTED NOT TO HAVE EXPERIENCED ANY OF THE FOLLOWING EVENTS N/A 6/2 CERVICAL EPIDURAL INJECTION;  Surgeon: Eduardo Galeana DO;  Location: [address] [place] [address]   • PATIENT [address] [place] [address] FOR IV ANTIBIOTIC SURGICAL SITE INFECTION PROPHYLAXIS.  N/A 8/2/2016    Procedure: CERVICAL EPIDURAL INJECTION;  Surgeon Labs:    Recent Labs   Lab 01/09/22  0838   WBC 14.5*   HGB 12.1   HCT 38.8   .0     Recent Labs   Lab 01/09/22  0838   *   BUN 30*   CREATSERUM 1.77*   GFRAA 28*   GFRNAA 24*   CA 9.6      K 4.2      CO2 12.0*   AST 25   ALT in the right frontal and right parietal sulci.  Large left occipital scalp laceration/hematoma.       2.  New thin subdural effusion along the right cerebral convexity measuring up to 6 mm in thickness.       3. Stable mild-to-moderate chronic microvascular

## 2022-01-09 NOTE — ED QUICK NOTES
Bleeding does appear controlled at this time from head lac. Gauze pressure dressing with kerlex placed at this time.

## 2022-01-09 NOTE — ED QUICK NOTES
Pt lying on cart, resps still appear shallow, pt with pursed lip breathing. Pt still with NS infusing wide open, approx 400ml at this time. Per verbal order verified, ok with Dr. Somers Lunch to administer 500 ml total of saline.

## 2022-01-09 NOTE — ED QUICK NOTES
Central line placed to right groin by Dr. Mavis Duran. Daughter remains at bedside. Report given to Raquel Mercado RN.

## 2022-01-09 NOTE — ED QUICK NOTES
Unable to obtain additional blood specimens. Phlebotomy paged at this time. Dr. Alejandro Henry at beside speaking with pt's daughter. Per Dr. Alejandro Henry, ok to administer antibiotics prior to obtaining 2nd blood culture and ok to administer 2nd liter of fluid.

## 2022-01-09 NOTE — ED QUICK NOTES
Fluids infusing wide now to left forearm. Will leave IV to right forearm in place at this time and continue to monitor. Will recheck blood pressure.

## 2022-01-10 ENCOUNTER — APPOINTMENT (OUTPATIENT)
Dept: CT IMAGING | Facility: HOSPITAL | Age: 87
DRG: 871 | End: 2022-01-10
Attending: INTERNAL MEDICINE
Payer: MEDICARE

## 2022-01-10 ENCOUNTER — APPOINTMENT (OUTPATIENT)
Dept: CT IMAGING | Facility: HOSPITAL | Age: 87
DRG: 871 | End: 2022-01-10
Attending: Other
Payer: MEDICARE

## 2022-01-10 LAB
ANION GAP SERPL CALC-SCNC: 6 MMOL/L (ref 0–18)
BASOPHILS # BLD AUTO: 0.01 X10(3) UL (ref 0–0.2)
BASOPHILS NFR BLD AUTO: 0.1 %
BUN BLD-MCNC: 32 MG/DL (ref 7–18)
CALCIUM BLD-MCNC: 7.4 MG/DL (ref 8.5–10.1)
CHLORIDE SERPL-SCNC: 120 MMOL/L (ref 98–112)
CO2 SERPL-SCNC: 20 MMOL/L (ref 21–32)
CREAT BLD-MCNC: 1.24 MG/DL
EOSINOPHIL # BLD AUTO: 0 X10(3) UL (ref 0–0.7)
EOSINOPHIL NFR BLD AUTO: 0 %
ERYTHROCYTE [DISTWIDTH] IN BLOOD BY AUTOMATED COUNT: 13.9 %
GLUCOSE BLD-MCNC: 112 MG/DL (ref 70–99)
GLUCOSE BLD-MCNC: 112 MG/DL (ref 70–99)
GLUCOSE BLD-MCNC: 116 MG/DL (ref 70–99)
GLUCOSE BLD-MCNC: 129 MG/DL (ref 70–99)
GLUCOSE BLD-MCNC: 141 MG/DL (ref 70–99)
GLUCOSE BLD-MCNC: 144 MG/DL (ref 70–99)
HCT VFR BLD AUTO: 22.1 %
HGB BLD-MCNC: 6.8 G/DL
HGB BLD-MCNC: 7.1 G/DL
HGB BLD-MCNC: 8.3 G/DL
IMM GRANULOCYTES # BLD AUTO: 0.14 X10(3) UL (ref 0–1)
IMM GRANULOCYTES NFR BLD: 0.8 %
LYMPHOCYTES # BLD AUTO: 0.8 X10(3) UL (ref 1–4)
LYMPHOCYTES NFR BLD AUTO: 4.5 %
MCH RBC QN AUTO: 30.5 PG (ref 26–34)
MCHC RBC AUTO-ENTMCNC: 32.1 G/DL (ref 31–37)
MCV RBC AUTO: 94.8 FL
MONOCYTES # BLD AUTO: 0.81 X10(3) UL (ref 0.1–1)
MONOCYTES NFR BLD AUTO: 4.6 %
NEUTROPHILS # BLD AUTO: 15.9 X10 (3) UL (ref 1.5–7.7)
NEUTROPHILS # BLD AUTO: 15.9 X10(3) UL (ref 1.5–7.7)
NEUTROPHILS NFR BLD AUTO: 90 %
OSMOLALITY SERPL CALC.SUM OF ELEC: 311 MOSM/KG (ref 275–295)
PLATELET # BLD AUTO: 182 10(3)UL (ref 150–450)
POTASSIUM SERPL-SCNC: 4.8 MMOL/L (ref 3.5–5.1)
RBC # BLD AUTO: 2.33 X10(6)UL
SODIUM SERPL-SCNC: 146 MMOL/L (ref 136–145)
WBC # BLD AUTO: 17.7 X10(3) UL (ref 4–11)

## 2022-01-10 PROCEDURE — 70450 CT HEAD/BRAIN W/O DYE: CPT | Performed by: OTHER

## 2022-01-10 PROCEDURE — 30233N1 TRANSFUSION OF NONAUTOLOGOUS RED BLOOD CELLS INTO PERIPHERAL VEIN, PERCUTANEOUS APPROACH: ICD-10-PCS | Performed by: INTERNAL MEDICINE

## 2022-01-10 PROCEDURE — 74176 CT ABD & PELVIS W/O CONTRAST: CPT | Performed by: INTERNAL MEDICINE

## 2022-01-10 PROCEDURE — 99291 CRITICAL CARE FIRST HOUR: CPT | Performed by: INTERNAL MEDICINE

## 2022-01-10 RX ORDER — SODIUM CHLORIDE 9 MG/ML
INJECTION, SOLUTION INTRAVENOUS ONCE
Status: COMPLETED | OUTPATIENT
Start: 2022-01-10 | End: 2022-01-10

## 2022-01-10 RX ORDER — HEPARIN SODIUM 5000 [USP'U]/ML
5000 INJECTION, SOLUTION INTRAVENOUS; SUBCUTANEOUS EVERY 12 HOURS SCHEDULED
Status: DISCONTINUED | OUTPATIENT
Start: 2022-01-10 | End: 2022-01-13

## 2022-01-10 NOTE — PHYSICAL THERAPY NOTE
PHYSICAL THERAPY EVALUATION - INPATIENT     Room Number: 2678/8390-H  Evaluation Date: 1/10/2022  Type of Evaluation: Initial  Physician Order: PT Eval and Treat    Presenting Problem: subarachnoid hemorrhage and L occipital laceration from fall  Co- clinical presentation is stable and overall the evaluation complexity is considered moderate. DISCHARGE RECOMMENDATIONS  PT Discharge Recommendations: Sub-acute rehabilitation    PLAN  PT Treatment Plan: Bed mobility; Endurance; Family education;Gait joselo Lower extremity ROM is within functional limits     Lower extremity strength: Pt required max assistance and repeated verbal cues to initiate B hip flexion in supine.        BALANCE  Static Sitting: Fair -  Dynamic Sitting: Dependent  Static Standing: D daughter)    Therapist and SPT Karen Henning PPE: Mask, gloves and goggles were worn. Patient/Family PPE: Patient's daughter wore mask entire session, patient did not wear mask.

## 2022-01-10 NOTE — OCCUPATIONAL THERAPY NOTE
OCCUPATIONAL THERAPY EVALUATION - INPATIENT     Room Number: 5786/3695-M  Evaluation Date: 1/10/2022  Type of Evaluation: Initial  Presenting Problem: [facility]    Physician Order: IP Consult to Occupational Therapy  Reason for Therapy: ADL/IADL Dysfunction and no ambulation tested                           Education provided: Role of OT, Safety with ADL and transfers, Overview of identified deficits, Activity recommendations, Dc Rec   Verbalized understanding, Needs reinforcement    Equipment used: TBD  Demonstr WNL  Sensation: Light touch:  intact    AM-PAC ‘6-Clicks’ Inpatient Daily Activity Short Form  -   Putting on and taking off regular lower body clothing?: A Lot  -   Bathing (including washing, rinsing, drying)?: A Lot  -   Toileting, which includes using

## 2022-01-10 NOTE — PROGRESS NOTES
[facility]  Neurocritical Care Progress Note     Brandon Spence Patient Status:  Inpatient    1928 MRN [id number]   [facility] 6NE-A Attending Chantell Johnston MD   Hosp Day # 1 PCP Lex Monae MD     Subjective: pupils equally round and reactive to light, extraocular muscles intact, face symmetric, visual fields full  · Motor- lua x4  · Sens-  Intact to light touch    Diagnostics:   CT BRAIN OR HEAD (67917)    Result Date: 1/10/2022  CONCLUSION:  Stable acute suba decision making, and/or extensive discussions with the patient, family, and clinical staff. Thank you for allowing me to participate in the care of this patient.      Dotty Mendez MD  Medical Director of System Neurosciences  Chief, Section of Neurocr

## 2022-01-10 NOTE — PROGRESS NOTES
01/09/22 2051   Clinical Encounter Type   Visited With Family  (POA completed POLST)   Routine Visit Introduction   Continue Visiting No   Referral From    Referral To Physician;Nurse  (POLST completed by POA ready for MD signature)   Family Spi

## 2022-01-10 NOTE — PROGRESS NOTES
[facility]     Hospitalist Progress Note     Stephannie Schirmer Patient Status:  Inpatient    1928 MRN [id number]   [facility] 6NE-A Attending Bonifacio Thompson MD   Hosp Day # 1 PCP Frandy Moscoso MD     Chief Complaint: fall traumatic Results   Component Value Date    COVID19 Not Detected 01/09/2022       Pro-Calcitonin  No results for input(s): PCT in the last 168 hours. Cardiac  No results for input(s): TROP, PBNP in the last 168 hours.     Creatinine Kinase  No results for input(s) imrpvoed      #GERD  #Hyperlipidemia  #Age-related memory loss     #Nutrition  -Keep n.p.o. until speech evaluation  -Chest x-ray without evidence of aspiration ammonia pneumonitis  -SLP evaluated and patietn too lethargic to participate    Plan of care: d

## 2022-01-10 NOTE — SLP NOTE
ADULT SWALLOWING EVALUATION    ASSESSMENT    ASSESSMENT/OVERALL IMPRESSION:  Patient seen for swallowing evaluation per protocol. Patient admitted from home with daughter s/p fall.   She speaks [place] and her daughter is at bedside to assist with commu communication assessment pending clinical course and establishing baseline with family assistance.     Jenkins Assessment of Swallow Function Score: Severe (128)    RECOMMENDATIONS   Diet Recommendations - Solids: NPO  Diet Recommendations - Liquids: NPO cyst with internal septations. 2.  Hypodense thyroid nodules, largest on the left measuring 0.7 cm.     Brain CT from 1/10/22 revealed:  CONCLUSION:  Stable acute subarachnoid hemorrhage involving the right frontal and right parietal lobes.       Small ri aspiration.)    Esophageal Phase of Swallow: No complaints consistent with possible esophageal involvement            GOALS  Goal #1 Patient will participate in reassessment of swallow function  In Progress     FOLLOW UP  Treatment Plan/Recommendations: LAKIA

## 2022-01-10 NOTE — PROGRESS NOTES
Christopher 80 Patient Status:  Inpatient    1928 MRN [id number]   [facility] 6NE-A Attending Montana Waite MD   [facility] Day # 1 PCP Nelda Hung MD     Critical Care Progress Note     Date of Admission:  shifts:   In: 2600 [I.V.:2500; IV PIGGYBACK:100]  Out: -   I/O this shift:  In: 0223 [I.V.:2123; IV PIGGYBACK:100]  Out: 300 [Urine:300]      Physical Exam:                          IVIXUNH: IINAUBFWC, not interactive for me this am                          from previous   4. FEN  ADAT to baseline pureed diet. NPO for now pending swallow evaluation. 5. Proph  SCD. No anticaog with bleeds. 6. Dispo  DNAR/Select = NO CPR, NO Defib, NO intubation.  Bel London MD  1/10/2022  6:58 AM

## 2022-01-10 NOTE — H&P
[facility] Hospitalist H&P       CC: Patient presents with:  Fall  Head Neck Injury       PCP: Ren Terrell MD    History of Present Illness:    Patient is a 80-year-old female with significant past medical history of GERD, hypertension, hyperlipidemia, age-r • High blood pressure    • High cholesterol    • Hypercholesterolemia    • Hypertension    • Language barrier     Daughters translate   • Osteoarthrosis, unspecified whether generalized or localized, unspecified site    • Osteoporosis     2014 DEXA   • R Surgeon: Kelly Francisco DO;  Location: [address] [place] [address]   • INJECTION, W/WO CONTRAST, DX/THERAPEUTIC SUBSTANCE, EPIDURAL/SUBARACHNOID; CERVICAL/THORACIC N/A 8/2/2016    Procedure: CERVICAL EPIDURAL INJECTION;  Surgeon: Kelly Francisco DO;  Loc EPIDURAL INJECTION;  Surgeon: Jayme Rivera DO;  Location: [address] [place] [address]   • PATIENT DOCUMENTED NOT TO HAVE EXPERIENCED ANY OF THE FOLLOWING EVENTS N/A 8/2/2016    Procedure: CERVICAL EPIDURAL INJECTION;  Surgeon: Jayme Rivera DO;  [facility]  except above  HEENT: Denies vision loss or double vision, denies hearing loss  Cardiovascular: Denies chest pain, palpitations, peripheral edema  Pulmonary: Denies cough, shortness of breath, or wheezing  Gastrointestinal: Denies abdominal pain, melena, or 2.9*       No results for input(s): TROP in the last 168 hours.     Additional Diagnostics: ECG: Sinus tach with PACs    CXR: image personally reviewed No lobar pneumonia or CHF    Radiology: CT BRAIN OR HEAD (20650)    Result Date: 1/9/2022  PROCEDURE:  CT ischemia/infarction, an MRI of the brain would be recommended for further evaluation. Critical results were discussed with Dr. Mavis Duran at 0920 hours on 1/9/2022. Critical results were read back.    Dictated by (CST): Burt Arnold MD on 1/09/2022 at 9:18 A vascular calcifications. Paraspinal soft tissues are grossly unremarkable. Heterogenous thyroid gland. The partially imaged posterior fossa is grossly unremarkable. CONCLUSION:  No acute fracture or subluxation in the cervical spine.   Jacob Sadler on board  -Repeat CT in a.m.  -BP control to keep less than 140  -Neurochecks, PT OT and speech eval      #Hyperglycemia without diagnosis of diabetes  -A1c noted to be 5.8  -SSI  -Monitor ACH S Accu-Cheks    #GERD  #Hyperlipidemia  #Age-related memory los

## 2022-01-10 NOTE — PROGRESS NOTES
[facility]  Neurosurgery Progress Note    Berta Panchal Patient Status:  Inpatient    1928 MRN [id number]   [facility] 6NE-A Attending Palmer Jc MD   Hosp Day # 1 PCP Cheyenne Noel MD     Chief Complaint:  Patient presents periventricular and subcortical white matter     SINUSES:           Partial opacification involving the ethmoid air cells anteriorly left greater than right.     MASTOIDS:          No sign of acute inflammation.    SKULL:             Large left scalp hemato

## 2022-01-10 NOTE — PLAN OF CARE
Assumed care of pt. At [address]. Pt. Resting in bed. Drowsy. COLLEEN orientation. Neuro checks Q1H, daughter at bedside to help w/ assessments. Pt. Speaks no english, GILA VENTURA. Pt. Seems to be at baseline per pts. Daughter. Straight cath.  CT in AM. Will continue t

## 2022-01-11 LAB
BASOPHILS # BLD AUTO: 0.02 X10(3) UL (ref 0–0.2)
BASOPHILS NFR BLD AUTO: 0.1 %
BLOOD TYPE BARCODE: 5100
EOSINOPHIL # BLD AUTO: 0.01 X10(3) UL (ref 0–0.7)
EOSINOPHIL NFR BLD AUTO: 0.1 %
ERYTHROCYTE [DISTWIDTH] IN BLOOD BY AUTOMATED COUNT: 16.2 %
GLUCOSE BLD-MCNC: 124 MG/DL (ref 70–99)
GLUCOSE BLD-MCNC: 124 MG/DL (ref 70–99)
GLUCOSE BLD-MCNC: 143 MG/DL (ref 70–99)
HCT VFR BLD AUTO: 25.4 %
HGB BLD-MCNC: 8.2 G/DL
IMM GRANULOCYTES # BLD AUTO: 0.19 X10(3) UL (ref 0–1)
IMM GRANULOCYTES NFR BLD: 1.1 %
LACTATE SERPL-SCNC: 1.2 MMOL/L (ref 0.4–2)
LYMPHOCYTES # BLD AUTO: 0.98 X10(3) UL (ref 1–4)
LYMPHOCYTES NFR BLD AUTO: 5.6 %
MCH RBC QN AUTO: 30.4 PG (ref 26–34)
MCHC RBC AUTO-ENTMCNC: 32.3 G/DL (ref 31–37)
MCV RBC AUTO: 94.1 FL
MONOCYTES # BLD AUTO: 1.15 X10(3) UL (ref 0.1–1)
MONOCYTES NFR BLD AUTO: 6.6 %
NEUTROPHILS # BLD AUTO: 15 X10 (3) UL (ref 1.5–7.7)
NEUTROPHILS # BLD AUTO: 15 X10(3) UL (ref 1.5–7.7)
NEUTROPHILS NFR BLD AUTO: 86.5 %
PLATELET # BLD AUTO: 146 10(3)UL (ref 150–450)
RBC # BLD AUTO: 2.7 X10(6)UL
WBC # BLD AUTO: 17.4 X10(3) UL (ref 4–11)

## 2022-01-11 PROCEDURE — 05H633Z INSERTION OF INFUSION DEVICE INTO LEFT SUBCLAVIAN VEIN, PERCUTANEOUS APPROACH: ICD-10-PCS | Performed by: INTERNAL MEDICINE

## 2022-01-11 PROCEDURE — B547ZZA ULTRASONOGRAPHY OF LEFT SUBCLAVIAN VEIN, GUIDANCE: ICD-10-PCS | Performed by: INTERNAL MEDICINE

## 2022-01-11 NOTE — PLAN OF CARE
Assumed care of pt at 1900. Neuro checks Q4hr. Pt at baseline per daughter. URIOSTEGUI. Mostly drowsy and minimally follows commands. NSR on monitor throughout night. Hypertensive in AM- labetalolx2. Straight cathX1. Daughter at bedside.  Pt and famil

## 2022-01-11 NOTE — PLAN OF CARE
Receieved pt at 0730. Pt. Resting in bed. Drowsy. Pt at baseline per daughter, GILA, intermittently follows commands. Neuro checks extended to q4hr. Transfer orders in place. Hgb 6.8, pulm updated, CT abd/pelvis ordered and 1 unit PRBC.  Family updated on pl

## 2022-01-11 NOTE — CONSULTS
[facility]  [facility] Urology  Consultation Note    Thelmadon Mcnealwa Patient Status:  Inpatient    1928 MRN [id number]   [facility] 6NE-A Attending Deja Carbajal MD   Hosp Day # 2 PCP Amelie Catalan MD     Reason for Consultation:  UTI Had urology evaluation     Past Surgical History:   Procedure Laterality Date   • CATARACT      Both eyes   • CHOLECYSTECTOMY     • FLUOR GID & Natali Sic NDL/CATH SPI DX/THER ZIW N/A 6/20/2014    Procedure: CERVICAL EPIDURAL INJECTION;  Surgeon: Benjamín Hart Location: [address] [place] [address]   • M-SEDAJ BY  PHYS PERFRMG SVC 5+ YR N/A 8/14/2014    Procedure: CERVICAL EPIDURAL;  Surgeon: Keith Altamirano DO;  Location: [facility]   • M-SEDAJ BY  PHYS [address] [facility] 59  N SVC 5+ YR N/A 2/26/2015    P EPIDURAL INJECTION;  Surgeon: Daniel Galeana DO;  Location: [address] [place] [address]   • PATIENT [address] FOR IV ANTIBIOTIC SURGICAL SITE INFECTION PROPHYLAXIS.  N/A 8/14/2014    Procedure: CERVICAL EPIDURAL;  Surgeon: Daniel Galeana (Tylenol) rectal suppository 650 mg, 650 mg, Rectal, Q4H PRN  •  ondansetron (ZOFRAN) injection 4 mg, 4 mg, Intravenous, Q6H PRN **OR** metoclopramide (REGLAN) injection 5 mg, 5 mg, Intravenous, Q8H PRN  •  LORazepam (ATIVAN) injection 1 mg, 1 mg, Intraven pancreatic atrophy without interval change.  No focal pancreatic lesions or evidence of pancreatitis.    SPLEEN:  No enlargement or focal lesion.     KIDNEYS:  No mass, obstruction, or calcification.     ADRENALS:  No mass or enlargement.     AORTA/VASCULAR Impression:  Patient Active Problem List:     Language barrier     Osteoporosis     Hypertension     Compression fracture of T12 vertebra (HCC)     Medial meniscus tear     Right rotator cuff tear     At risk for falling     Generalized osteoarthritis participate in the care of your patient.     Gaetano Phillips PA-C  [facility]  Department of Urology  1/11/2022  11:27 AM

## 2022-01-11 NOTE — PROGRESS NOTES
[facility]     Hospitalist Progress Note     Carter Spencer Patient Status:  Inpatient    1928 MRN [id number]   [facility] 6NE-A Attending Jesus Manuel Andre MD   Hosp Day # 2 PCP Noé Flores MD     Chief Complaint: fall traumatic (H)).    Recent Labs   Lab 01/09/22  1140   PTP 15.2*   INR 1.19            COVID-19 Lab Results    COVID-19  Lab Results   Component Value Date    COVID19 Not Detected 01/09/2022       Pro-Calcitonin  No results for input(s): PCT in the last 168 hours. findingws  -BP control to keep less than 140  -Neurochecks, PT OT and speech eval  -NSGY signed off   -k to transfer out of ICU         #Hyperglycemia without diagnosis of diabetes  -A1c noted to be 5.8  -SSI  -Monitor [facility] S Accu-Cheks  -stable and imrpvoe

## 2022-01-11 NOTE — SLP NOTE
SPEECH DAILY NOTE - INPATIENT    ASSESSMENT & PLAN   ASSESSMENT  Patient seen to reassess for improvement in swallow function. Patient alert and up in bed with daughter present and supportive.   Patient unable to follow commands though did not consistently GOALS  Goal #1 Patient will participate in reassessment of swallow function  In Progress     FOLLOW UP  Follow Up Needed (Documentation Required): Yes  SLP Follow-up Date: 01/12/22  Number of Visits to Meet Established Goals: 5    Session: 1 of 5    If

## 2022-01-11 NOTE — CM/SW NOTE
01/11/22 1100   CM/SW Referral Data   Referral Source Physician   Reason for Referral Discharge planning   Informant Daughter   Patient Info   Patient Communication Issues Language barrier   Patient's Home Environment Condo/Apt with elevator   Number of

## 2022-01-11 NOTE — PROGRESS NOTES
Laukaantiradha 80 Patient Status:  Inpatient    1928 MRN [id number]   [facility] 6NE-A Attending Vika Carney MD   [address] Day # 2 PCP Reese Ruiz MD     Critical Care Progress Note     Date of Admission:  [JYSAP:8358]  No intake/output data recorded.      Physical Exam:                          PTSLAFO: ESIUNTJRO, not interactive                          HEENT: oropharynx clear without erythema or exudates, moist mucous membranes                          [place] in clinical status .      Constance Monaco MD

## 2022-01-12 LAB
ANION GAP SERPL CALC-SCNC: 3 MMOL/L (ref 0–18)
BASOPHILS # BLD AUTO: 0.02 X10(3) UL (ref 0–0.2)
BASOPHILS NFR BLD AUTO: 0.2 %
BUN BLD-MCNC: 28 MG/DL (ref 7–18)
CALCIUM BLD-MCNC: 7.7 MG/DL (ref 8.5–10.1)
CHLORIDE SERPL-SCNC: 128 MMOL/L (ref 98–112)
CO2 SERPL-SCNC: 23 MMOL/L (ref 21–32)
CREAT BLD-MCNC: 0.84 MG/DL
EOSINOPHIL # BLD AUTO: 0 X10(3) UL (ref 0–0.7)
EOSINOPHIL NFR BLD AUTO: 0 %
ERYTHROCYTE [DISTWIDTH] IN BLOOD BY AUTOMATED COUNT: 16.2 %
GLUCOSE BLD-MCNC: 111 MG/DL (ref 70–99)
GLUCOSE BLD-MCNC: 118 MG/DL (ref 70–99)
GLUCOSE BLD-MCNC: 119 MG/DL (ref 70–99)
GLUCOSE BLD-MCNC: 122 MG/DL (ref 70–99)
GLUCOSE BLD-MCNC: 149 MG/DL (ref 70–99)
GLUCOSE BLD-MCNC: 174 MG/DL (ref 70–99)
HCT VFR BLD AUTO: 26.7 %
HGB BLD-MCNC: 8.1 G/DL
IMM GRANULOCYTES # BLD AUTO: 0.12 X10(3) UL (ref 0–1)
IMM GRANULOCYTES NFR BLD: 0.9 %
LYMPHOCYTES # BLD AUTO: 0.7 X10(3) UL (ref 1–4)
LYMPHOCYTES NFR BLD AUTO: 5.3 %
MCH RBC QN AUTO: 29.5 PG (ref 26–34)
MCHC RBC AUTO-ENTMCNC: 30.3 G/DL (ref 31–37)
MCV RBC AUTO: 97.1 FL
MONOCYTES # BLD AUTO: 0.75 X10(3) UL (ref 0.1–1)
MONOCYTES NFR BLD AUTO: 5.7 %
NEUTROPHILS # BLD AUTO: 11.67 X10 (3) UL (ref 1.5–7.7)
NEUTROPHILS # BLD AUTO: 11.67 X10(3) UL (ref 1.5–7.7)
NEUTROPHILS NFR BLD AUTO: 87.9 %
OSMOLALITY SERPL CALC.SUM OF ELEC: 325 MOSM/KG (ref 275–295)
PLATELET # BLD AUTO: 175 10(3)UL (ref 150–450)
POTASSIUM SERPL-SCNC: 3.6 MMOL/L (ref 3.5–5.1)
RBC # BLD AUTO: 2.75 X10(6)UL
SODIUM SERPL-SCNC: 154 MMOL/L (ref 136–145)
WBC # BLD AUTO: 13.3 X10(3) UL (ref 4–11)

## 2022-01-12 RX ORDER — DEXTROSE MONOHYDRATE 50 MG/ML
INJECTION, SOLUTION INTRAVENOUS CONTINUOUS
Status: DISCONTINUED | OUTPATIENT
Start: 2022-01-12 | End: 2022-01-14

## 2022-01-12 NOTE — PLAN OF CARE
Daughter states her goals from mother are to be healthy . She would like her to be able to eat. Walk to bathroom . And be able to go to bathroom her self. Transfer orders Patient passed swallow . Placed in chair position in bed fair appetite for lunch.  PT

## 2022-01-12 NOTE — SLP NOTE
ADULT SWALLOWING EVALUATION    ASSESSMENT    ASSESSMENT/OVERALL IMPRESSION:  Patient seen to assess for improvement in swallow function.   She is alert and up in bed with daughter present who was supportive throughout and assisted with communication as need only)                        Compensatory Strategies Recommended: No straws; Liquids via spoon; Slow rate;Small bites and sips;Multiple swallows  Aspiration Precautions: Upright position; Slow rate;Small bites and sips; No straw (liquids by teaspoon only)  Med from 1/10/22 revealed:  CONCLUSION:     1. No evidence of acute intra-abdominal hemorrhage/hematoma involving the peritoneum or retroperitoneum.  No mass lesions are identified.  No free air, free fluid or inflammatory changes of the peritoneal cavity.    2 Progress   Goal #2 The patient/family/caregiver will demonstrate understanding and implementation of aspiration precautions and swallow strategies independently over 2-3 session(s).     In Progress     FOLLOW UP  Treatment Plan/Recommendations: Dysphagia th

## 2022-01-12 NOTE — PLAN OF CARE
Assumed care of pt at approximately 1930, NSR on tele, 2L NC while sleeping to maintain sats above 94%.  Neuros Q4, URIOSTEGUI but not to command, daughter at bedside helped translate throughout night, oftentimes pt refused to participate in exams, see flowsheets

## 2022-01-12 NOTE — CM/SW NOTE
Patient reserved with [facility]  P:[phone]  F:[phone]. SW will continue to follow.     Josef Tompkins LCSW 16

## 2022-01-12 NOTE — PROGRESS NOTES
[facility]     Hospitalist Progress Note     Stephannie Schirmer Patient Status:  Inpatient    1928 MRN [id number]   [facility] 6NE-A Attending Bonifacio Thompson MD   Hosp Day # 3 PCP Frandy Moscoso MD     Chief Complaint: fall traumatic 12.0* 20.0* 23.0   ALKPHO 58  --   --    AST 25  --   --    ALT 22  --   --    BILT 0.2  --   --    TP 7.1  --   --        Estimated Creatinine Clearance: 32.1 mL/min (based on SCr of 0.84 mg/dL).     Recent Labs   Lab 01/09/22  1140   PTP 15.2*   INR 1.19 baseline    #Hypernatremia  -Sodium at 154 started D5 water check sodium in the morning  -Check urin osmolality      #Head trauma  #Subarachnoid subdural hemorrhage as noted above  -Prophylactic Keppra, neurosurgery as well as neurology on board  -Repeat C

## 2022-01-12 NOTE — OCCUPATIONAL THERAPY NOTE
OCCUPATIONAL THERAPY TREATMENT NOTE - INPATIENT     Room Number: 6479/5884-Q  Session: 1   Number of Visits to Meet Established Goals: 5    History: Patient is a 80year old female admitted on 1/9/2022 with Presenting Problem: SAH. PMH of decreased memory. Wrist flexion/extension     Gross grasp/fist pumps     Ankle pumps     Knee extension     Marching       Patient End of Session: Up in chair;Needs met;RN aware of session/findings; All patient questions and concerns addressed;SCDs in place; Family present stand at sink for 5 minutes to complete grooming routine    Writer PPE: Surgical mask, goggles, and gloves worn.      Patient/family PPE: Mask not worn

## 2022-01-12 NOTE — CM/SW NOTE
MSW received a message from the patient's nurse that he would like to stop HD. PC was consulted and they suggested to speak directly with hospice. MSW called [facility] with referral.  Order entered.     Franc Gerard LCSW

## 2022-01-12 NOTE — PLAN OF CARE
Received this am with Daughter @ bedside. She states mother is baseline sleeps a lot URIOSTEGUI not on commands . She states able to understand some of her words. Speaks [place] .  Daughter asks her questions doesn 't answer like name where she is at month time

## 2022-01-12 NOTE — PHYSICAL THERAPY NOTE
PHYSICAL THERAPY TREATMENT NOTE - INPATIENT    Room Number: 3383/7633-C     Session: 1     Number of Visits to Meet Established Goals: 8    Presenting Problem: subarachnoid hemorrhage and L occipital laceration from fall  Co-Morbidities : age-related me Fair  Dynamic Sitting: Poor -           Static Standing: Dependent  Dynamic Standing: Dependent    AM-PAC '6-Clicks' INPATIENT SHORT FORM - BASIC MOBILITY  How much difficulty does the patient currently have. ..   Patient Difficulty: Turning over in bed (inc transfer from bed>chair. Once in chair, pt able to clear buttocks and ambulate. Pt's daughter present throughout treatment for translation    Patient End of Session: In bed; With [facility] staff;Needs met;RN aware of session/findings; All patient questions and

## 2022-01-13 LAB
ALBUMIN SERPL-MCNC: 2.2 G/DL (ref 3.4–5)
ANION GAP SERPL CALC-SCNC: 5 MMOL/L (ref 0–18)
ANION GAP SERPL CALC-SCNC: 5 MMOL/L (ref 0–18)
BASOPHILS # BLD AUTO: 0.03 X10(3) UL (ref 0–0.2)
BASOPHILS NFR BLD AUTO: 0.3 %
BUN BLD-MCNC: 19 MG/DL (ref 7–18)
BUN BLD-MCNC: 24 MG/DL (ref 7–18)
CALCIUM BLD-MCNC: 7.6 MG/DL (ref 8.5–10.1)
CALCIUM BLD-MCNC: 7.8 MG/DL (ref 8.5–10.1)
CHLORIDE SERPL-SCNC: 122 MMOL/L (ref 98–112)
CHLORIDE SERPL-SCNC: 128 MMOL/L (ref 98–112)
CO2 SERPL-SCNC: 21 MMOL/L (ref 21–32)
CO2 SERPL-SCNC: 21 MMOL/L (ref 21–32)
CREAT BLD-MCNC: 0.92 MG/DL
CREAT BLD-MCNC: 0.93 MG/DL
EOSINOPHIL # BLD AUTO: 0.02 X10(3) UL (ref 0–0.7)
EOSINOPHIL NFR BLD AUTO: 0.2 %
ERYTHROCYTE [DISTWIDTH] IN BLOOD BY AUTOMATED COUNT: 16.3 %
ERYTHROCYTE [DISTWIDTH] IN BLOOD BY AUTOMATED COUNT: 16.4 %
GLUCOSE BLD-MCNC: 117 MG/DL (ref 70–99)
GLUCOSE BLD-MCNC: 126 MG/DL (ref 70–99)
GLUCOSE BLD-MCNC: 126 MG/DL (ref 70–99)
GLUCOSE BLD-MCNC: 128 MG/DL (ref 70–99)
GLUCOSE BLD-MCNC: 151 MG/DL (ref 70–99)
GLUCOSE BLD-MCNC: 156 MG/DL (ref 70–99)
HCT VFR BLD AUTO: 23 %
HCT VFR BLD AUTO: 24.3 %
HGB BLD-MCNC: 7.1 G/DL
HGB BLD-MCNC: 7.5 G/DL
IMM GRANULOCYTES # BLD AUTO: 0.12 X10(3) UL (ref 0–1)
IMM GRANULOCYTES NFR BLD: 1.2 %
LYMPHOCYTES # BLD AUTO: 0.84 X10(3) UL (ref 1–4)
LYMPHOCYTES NFR BLD AUTO: 8.7 %
MCH RBC QN AUTO: 29.3 PG (ref 26–34)
MCH RBC QN AUTO: 29.8 PG (ref 26–34)
MCHC RBC AUTO-ENTMCNC: 30.9 G/DL (ref 31–37)
MCHC RBC AUTO-ENTMCNC: 30.9 G/DL (ref 31–37)
MCV RBC AUTO: 95 FL
MCV RBC AUTO: 96.4 FL
MONOCYTES # BLD AUTO: 0.85 X10(3) UL (ref 0.1–1)
MONOCYTES NFR BLD AUTO: 8.8 %
NEUTROPHILS # BLD AUTO: 7.77 X10 (3) UL (ref 1.5–7.7)
NEUTROPHILS # BLD AUTO: 7.77 X10(3) UL (ref 1.5–7.7)
NEUTROPHILS NFR BLD AUTO: 80.8 %
OSMOLALITY SERPL CALC.SUM OF ELEC: 311 MOSM/KG (ref 275–295)
OSMOLALITY SERPL CALC.SUM OF ELEC: 323 MOSM/KG (ref 275–295)
PHOSPHATE SERPL-MCNC: 1.1 MG/DL (ref 2.5–4.9)
PLATELET # BLD AUTO: 190 10(3)UL (ref 150–450)
PLATELET # BLD AUTO: 197 10(3)UL (ref 150–450)
POTASSIUM SERPL-SCNC: 3.3 MMOL/L (ref 3.5–5.1)
POTASSIUM SERPL-SCNC: 4.1 MMOL/L (ref 3.5–5.1)
POTASSIUM SERPL-SCNC: 4.1 MMOL/L (ref 3.5–5.1)
RBC # BLD AUTO: 2.42 X10(6)UL
RBC # BLD AUTO: 2.52 X10(6)UL
SODIUM SERPL-SCNC: 148 MMOL/L (ref 136–145)
SODIUM SERPL-SCNC: 154 MMOL/L (ref 136–145)
WBC # BLD AUTO: 9.6 X10(3) UL (ref 4–11)
WBC # BLD AUTO: 9.6 X10(3) UL (ref 4–11)

## 2022-01-13 RX ORDER — SENNOSIDES 8.6 MG
8.6 TABLET ORAL 2 TIMES DAILY
Status: DISCONTINUED | OUTPATIENT
Start: 2022-01-13 | End: 2022-01-24

## 2022-01-13 RX ORDER — HYDRALAZINE HYDROCHLORIDE 25 MG/1
25 TABLET, FILM COATED ORAL EVERY 8 HOURS SCHEDULED
Status: DISCONTINUED | OUTPATIENT
Start: 2022-01-13 | End: 2022-01-24

## 2022-01-13 RX ORDER — HYDRALAZINE HYDROCHLORIDE 25 MG/1
25 TABLET, FILM COATED ORAL EVERY 12 HOURS
Status: DISCONTINUED | OUTPATIENT
Start: 2022-01-13 | End: 2022-01-13

## 2022-01-13 RX ORDER — HYDRALAZINE HYDROCHLORIDE 20 MG/ML
10 INJECTION INTRAMUSCULAR; INTRAVENOUS EVERY 6 HOURS PRN
Status: DISCONTINUED | OUTPATIENT
Start: 2022-01-13 | End: 2022-01-24

## 2022-01-13 NOTE — HOME CARE LIAISON
Received referral via [employer] for Home Health services. Spoke w/ patients daughter and provided with list of Mahesh Cesar providers from [facility], patient choice is [facility].  Agency reserved in [facility] and contact information placed on AVS.Financial interes

## 2022-01-13 NOTE — PLAN OF CARE
Received patient confused, 2L nasal cannula, NSR on tele at 0730  Bilateral mittens, see flowsheet  Seizure precautions continued  Neuro q4, daughter translating, see flowsheet  Swelling to MELANIE noted, MD aware, US ordered, elevated on 2 pillows, per MD ok

## 2022-01-13 NOTE — VASCULAR ACCESS
Vascular Access was consulted for additional access. LUE very swollen and skin is tight. Assessed LUE with ultrasound. Infiltration noted in the subcutaneous tissue from the fingers to the upper arm.   Assessed 22G PIV, noted catheter to be out of the ve

## 2022-01-13 NOTE — SLP NOTE
SPEECH DAILY NOTE - INPATIENT    ASSESSMENT & PLAN   ASSESSMENT  Pt seen for dysphagia tx to assess tolerance with recommended diet, ensure proper utilization of aspiration precautions and provide pt/family education.   Patient up in bed with daughter alexandra Recommended: No straws; Liquids via spoon; Slow rate;Small bites and sips;Multiple swallows  Aspiration Precautions: Upright position; Slow rate;Small bites and sips; No straw (liquids by teaspoon only)  Medication Administration Recommendations: Crushed in pu

## 2022-01-13 NOTE — PHYSICAL THERAPY NOTE
PHYSICAL THERAPY TREATMENT NOTE - INPATIENT    Room Number: 0045/7546-H     Session: 2     Number of Visits to Meet Established Goals: 8    Presenting Problem: subarachnoid hemorrhage and L occipital laceration from fall  Co-Morbidities : age-related me RESTRICTION  Weight Bearing Restriction: None                PAIN ASSESSMENT   Rating: Unable to rate          BALANCE                                                                                                                       Static Sitting: [employer] to maintain eyes open during session. MD Carlos Cole arrived during session for assessment. RN aware of session findings. Patient End of Session: Up in chair;Needs met;Call light within reach;RN aware of session/findings; All patient questions and concern

## 2022-01-13 NOTE — CONSULTS
[facility]    Report of Consultation    Ayaka Splinter Patient Status:  Inpatient    1928 MRN [id number]   [facility] 7NE-A Attending Tawny Hansen MD   [facility] Day # 4 PCP Kameron Reyes MD       REASON FOR CONSULT:     [employer]   • FLUOR GID & Dalia Lundborg NDL/CATH SPI DX/THER NJX N/A 8/14/2014    Procedure: CERVICAL EPIDURAL;  Surgeon: Mireille Webster DO;  Location: [address] [place] [address]   • FLUOR GID & Dalia Lundborg NDL/CATH [address] [facility] DX/THER Jayson Pavan Iris [address] N/A 2/26/2015    Procedure: Cassandra Hernández Marce Nielsen DO;  Location: [address] [place] [address]   • M-SEDAJ BY [employer] PHYS [address]  N SVC 5+ YR N/A 10/19/2015    Procedure: CERVICAL EPIDURAL INJECTION;  Surgeon: Reyna Salinas DO;  Location: [address] [place] [address]   • M-SEDAJ BY [employer] [address]  N SV • PATIENT [place] PREOPERATIVE ORDER FOR IV ANTIBIOTIC SURGICAL SITE INFECTION PROPHYLAXIS.  N/A 2/26/2015    Procedure: CERVICAL EPIDURAL;  Surgeon: Eddie Romero DO;  Location: [address] [place] [address]   • PATIENT [address] file.        PHYSICAL EXAM:     Vital Signs: /63 (BP Location: Right arm)   Pulse 94   Temp 98.2 °F (36.8 °C) (Oral)   Resp 20   Wt 107 lb 2.3 oz (48.6 kg)   SpO2 95%   BMI 23.19 kg/m²   Temp (24hrs), Av.6 °F (37 °C), Min:98 °F (36.7 °C), Max:98. 01/13/2022    MOABSO 0.85 01/13/2022    EOABSO 0.02 01/13/2022    BAABSO 0.03 01/13/2022     No results found for: Silvia Crumb, CREAURINE, MIALBURINE, MCRRATIOUR, MALBCRECALC, MICROALBUMIN, CREAUR, MALBCREACALC  Lab Results   Component Value Date    COLOR

## 2022-01-13 NOTE — PLAN OF CARE
Pt confused and disoriented, minimally verbal. Language barrier, family at bedside to assist.   Follows minimal commands. Bilat mittens in place per order d/t pulling at IV/Tele wires. NSR on tele. 2L NC overnight, RA during day. Ordoñez cath in place.

## 2022-01-13 NOTE — PROGRESS NOTES
[facility]     Hospitalist Progress Note     Genaro Melvin Patient Status:  Inpatient    1928 MRN [id number]   [facility] 6NE-A Attending Eva Contreras MD   Hosp Day # 4 PCP Jacob Sheikh MD     Chief Complaint: fall traumatic 146* 154* 154*   K 4.2   < > 4.8 3.6 4.1  4.1      < > 120* 128* 128*   CO2 12.0*   < > 20.0* 23.0 21.0   ALKPHO 58  --   --   --   --    AST 25  --   --   --   --    ALT 22  --   --   --   --    BILT 0.2  --   --   --   --    TP 7.1  --   --   -- Accu-Cheks       #GERD  #Hyperlipidemia  #Age-related memory loss     #Nutrition  SLP evaluated patient and okayed to have puréed and nectar thick      Dispo: inpt     PT OT recommend subacute rehab however daughter wants to take patient home with home hevu

## 2022-01-14 ENCOUNTER — APPOINTMENT (OUTPATIENT)
Dept: ULTRASOUND IMAGING | Facility: HOSPITAL | Age: 87
DRG: 871 | End: 2022-01-14
Attending: HOSPITALIST
Payer: MEDICARE

## 2022-01-14 LAB
ALBUMIN SERPL-MCNC: 1.7 G/DL (ref 3.4–5)
ALBUMIN SERPL-MCNC: 1.9 G/DL (ref 3.4–5)
ANION GAP SERPL CALC-SCNC: 6 MMOL/L (ref 0–18)
ANION GAP SERPL CALC-SCNC: 7 MMOL/L (ref 0–18)
BASOPHILS # BLD AUTO: 0.02 X10(3) UL (ref 0–0.2)
BASOPHILS NFR BLD AUTO: 0.3 %
BUN BLD-MCNC: 15 MG/DL (ref 7–18)
BUN BLD-MCNC: 19 MG/DL (ref 7–18)
CA-I BLD-SCNC: 1.15 MMOL/L (ref 1.12–1.32)
CALCIUM BLD-MCNC: 6.5 MG/DL (ref 8.5–10.1)
CALCIUM BLD-MCNC: 7.7 MG/DL (ref 8.5–10.1)
CHLORIDE SERPL-SCNC: 119 MMOL/L (ref 98–112)
CHLORIDE SERPL-SCNC: 124 MMOL/L (ref 98–112)
CO2 SERPL-SCNC: 17 MMOL/L (ref 21–32)
CO2 SERPL-SCNC: 18 MMOL/L (ref 21–32)
CREAT BLD-MCNC: 0.55 MG/DL
CREAT BLD-MCNC: 0.89 MG/DL
EOSINOPHIL # BLD AUTO: 0.33 X10(3) UL (ref 0–0.7)
EOSINOPHIL NFR BLD AUTO: 4.9 %
ERYTHROCYTE [DISTWIDTH] IN BLOOD BY AUTOMATED COUNT: 16.8 %
GLUCOSE BLD-MCNC: 107 MG/DL (ref 70–99)
GLUCOSE BLD-MCNC: 109 MG/DL (ref 70–99)
GLUCOSE BLD-MCNC: 116 MG/DL (ref 70–99)
GLUCOSE BLD-MCNC: 157 MG/DL (ref 70–99)
GLUCOSE BLD-MCNC: 81 MG/DL (ref 70–99)
GLUCOSE BLD-MCNC: 96 MG/DL (ref 70–99)
HCT VFR BLD AUTO: 25.8 %
HGB BLD-MCNC: 8 G/DL
IMM GRANULOCYTES # BLD AUTO: 0.09 X10(3) UL (ref 0–1)
IMM GRANULOCYTES NFR BLD: 1.3 %
LYMPHOCYTES # BLD AUTO: 0.9 X10(3) UL (ref 1–4)
LYMPHOCYTES NFR BLD AUTO: 13.4 %
MCH RBC QN AUTO: 29.7 PG (ref 26–34)
MCHC RBC AUTO-ENTMCNC: 31 G/DL (ref 31–37)
MCV RBC AUTO: 95.9 FL
MONOCYTES # BLD AUTO: 0.6 X10(3) UL (ref 0.1–1)
MONOCYTES NFR BLD AUTO: 8.9 %
NEUTROPHILS # BLD AUTO: 4.8 X10 (3) UL (ref 1.5–7.7)
NEUTROPHILS # BLD AUTO: 4.8 X10(3) UL (ref 1.5–7.7)
NEUTROPHILS NFR BLD AUTO: 71.2 %
OSMOLALITY SERPL CALC.SUM OF ELEC: 301 MOSM/KG (ref 275–295)
OSMOLALITY SERPL CALC.SUM OF ELEC: 305 MOSM/KG (ref 275–295)
PHOSPHATE SERPL-MCNC: 1.7 MG/DL (ref 2.5–4.9)
PHOSPHATE SERPL-MCNC: 3.4 MG/DL (ref 2.5–4.9)
PLATELET # BLD AUTO: 156 10(3)UL (ref 150–450)
POTASSIUM SERPL-SCNC: 3.3 MMOL/L (ref 3.5–5.1)
POTASSIUM SERPL-SCNC: 3.4 MMOL/L (ref 3.5–5.1)
RBC # BLD AUTO: 2.69 X10(6)UL
SODIUM SERPL-SCNC: 144 MMOL/L (ref 136–145)
SODIUM SERPL-SCNC: 147 MMOL/L (ref 136–145)
WBC # BLD AUTO: 6.7 X10(3) UL (ref 4–11)

## 2022-01-14 PROCEDURE — 93971 EXTREMITY STUDY: CPT | Performed by: HOSPITALIST

## 2022-01-14 PROCEDURE — 99223 1ST HOSP IP/OBS HIGH 75: CPT | Performed by: INTERNAL MEDICINE

## 2022-01-14 RX ORDER — DEXTROSE MONOHYDRATE 50 MG/ML
INJECTION, SOLUTION INTRAVENOUS CONTINUOUS
Status: DISCONTINUED | OUTPATIENT
Start: 2022-01-14 | End: 2022-01-15

## 2022-01-14 RX ORDER — POTASSIUM CHLORIDE 14.9 MG/ML
20 INJECTION INTRAVENOUS ONCE
Status: COMPLETED | OUTPATIENT
Start: 2022-01-15 | End: 2022-01-15

## 2022-01-14 NOTE — PROGRESS NOTES
[facility]     Hospitalist Progress Note     Irene Logan Patient Status:  Inpatient    1928 MRN [id number]   [facility] 6NE-A Attending Irene Jack MD   Hosp Day # 5 PCP Nelda Hung MD     Chief Complaint: fall traumatic --   --   --    AST 25  --   --   --   --    ALT 22  --   --   --   --    BILT 0.2  --   --   --   --    TP 7.1  --   --   --   --     < > = values in this interval not displayed.        Estimated Creatinine Clearance: 29 mL/min (based on SCr of 0.93 mg/dL diabetes  -A1c noted to be 5.8  -SSI  -Monitor ACH S Accu-Cheks       #GERD  #Hyperlipidemia  #Age-related memory loss     #Nutrition  SLP evaluated patient and okayed to have puréed and nectar thick      Dispo: inpt, entire conversation observed by [employer]

## 2022-01-14 NOTE — PLAN OF CARE
Assumed care at 1930  D5W decreased to 75 ml/hr per nephrology  Bilateral mittens in place  Ordoñez intact  Family at bedside to help w/ translation  Needs met will continue to monitor

## 2022-01-14 NOTE — PLAN OF CARE
Received patient lethargic, RA, NSR on tele at 0730  Restraints stopped as patient is less agitated with them off, not pulling at lines  Seizure precautions maintained  US completed, per MD ok to use Left midline  IVF stopped, electrolytes replaced per Nep

## 2022-01-14 NOTE — PROGRESS NOTES
[facility]    Nephrology Progress Note    Genaro Charles Attending:  Elan Johnson MD       SUBJECTIVE:     Pt sleepy, not answering questions with assistance of daughter to translate.   Discussed with nursing, no IV access currently, therefore no IV flui 71.2 01/14/2022    LYPERCENT 13.4 01/14/2022    MOPERCENT 8.9 01/14/2022    EOPERCENT 4.9 01/14/2022    BAPERCENT 0.3 01/14/2022    NE 4.80 01/14/2022    LYMABS 0.90 (L) 01/14/2022    MOABSO 0.60 01/14/2022    EOABSO 0.33 01/14/2022    BAABSO 0.02 01/14/20 presented with fall complicated by [facility] and SDH. Also found to have UTI with urinary retention and low blood pressures with VERENICE to Cr 1.77 on admission, s/p anderson, IV fluids and now on unasyn.  Nephrology consulted for hypernatremia.     Hypernatremia: dehyd

## 2022-01-14 NOTE — CONSULTS
[id number] [facility] Initial Consult    Roldan Jean-Pierreks Patient Status:  Inpatient    1928 MRN [id number]   [facility] 7NE-A Attending Angelique Haskins MD   [facility] Day # 5 PCP Jocelyn Tolliver MD     Date of Consult: 2022  Chary --pt lives at home with her other daughter. --reviewed medical history and current clinical situation. Daughter mentioned that she wants for pt to stay in the hospital till she is able to walk few steps and all her medical issues are resolved.  Explaine • High cholesterol    • Hypercholesterolemia    • Hypertension    • Language barrier     Daughters translate   • Osteoarthrosis, unspecified whether generalized or localized, unspecified site    • Osteoporosis     2014 DEXA   • Reflux    • Shoulder pain Location: [address] [place] [address]   • INJECTION, W/WO CONTRAST, DX/THERAPEUTIC SUBSTANCE, EPIDURAL/SUBARACHNOID; CERVICAL/THORACIC N/A 8/2/2016    Procedure: CERVICAL EPIDURAL INJECTION;  Surgeon: Moncho Graham DO;  Location: [address] [place] [address] Abi Torres DO;  Location: [address] [place] [address]   • PATIENT DOCUMENTED NOT TO HAVE EXPERIENCED ANY OF THE FOLLOWING EVENTS N/A 8/2/2016    Procedure: CERVICAL EPIDURAL INJECTION;  Surgeon: Daniel Galeana DO;  Location: [address] [place] [address] Perform Job     Full                   Normal or reduced       Full                                  Some Disease    60    Reduced          Can't Perform Hobby Occ Assist       Normal or reduced       Full or confused                                  Signi LORazepam (ATIVAN) injection 1 mg, 1 mg, Intravenous, Q15 Min PRN  •  levETIRAcetam (KEPPRA) injection 250 mg, 250 mg, Intravenous, Q12H  •  Insulin Aspart Pen (NOVOLOG) 100 UNIT/ML flexpen 1-5 Units, 1-5 Units, Subcutaneous, TID CC and HS  No current outp apparent respiratory distress.   HEENT: relaxed facial expression   Lungs:  Normal excursions and effort  Abdomen: Soft, non-tender, non-distended, normal bowel sounds X 4 quadrants, no rebound or guarding  Extremities: Without clubbing, cyanosis Peripheral

## 2022-01-15 LAB
ALBUMIN SERPL-MCNC: 1.9 G/DL (ref 3.4–5)
ANION GAP SERPL CALC-SCNC: 4 MMOL/L (ref 0–18)
ANION GAP SERPL CALC-SCNC: 5 MMOL/L (ref 0–18)
BUN BLD-MCNC: 16 MG/DL (ref 7–18)
BUN BLD-MCNC: 18 MG/DL (ref 7–18)
CALCIUM BLD-MCNC: 7.7 MG/DL (ref 8.5–10.1)
CALCIUM BLD-MCNC: 8.5 MG/DL (ref 8.5–10.1)
CHLORIDE SERPL-SCNC: 115 MMOL/L (ref 98–112)
CHLORIDE SERPL-SCNC: 117 MMOL/L (ref 98–112)
CO2 SERPL-SCNC: 21 MMOL/L (ref 21–32)
CO2 SERPL-SCNC: 22 MMOL/L (ref 21–32)
CREAT BLD-MCNC: 0.81 MG/DL
CREAT BLD-MCNC: 0.83 MG/DL
GLUCOSE BLD-MCNC: 108 MG/DL (ref 70–99)
GLUCOSE BLD-MCNC: 111 MG/DL (ref 70–99)
GLUCOSE BLD-MCNC: 122 MG/DL (ref 70–99)
GLUCOSE BLD-MCNC: 125 MG/DL (ref 70–99)
GLUCOSE BLD-MCNC: 149 MG/DL (ref 70–99)
GLUCOSE BLD-MCNC: 156 MG/DL (ref 70–99)
OSMOLALITY SERPL CALC.SUM OF ELEC: 294 MOSM/KG (ref 275–295)
OSMOLALITY SERPL CALC.SUM OF ELEC: 299 MOSM/KG (ref 275–295)
PHOSPHATE SERPL-MCNC: 3.1 MG/DL (ref 2.5–4.9)
POTASSIUM SERPL-SCNC: 4.2 MMOL/L (ref 3.5–5.1)
POTASSIUM SERPL-SCNC: 5 MMOL/L (ref 3.5–5.1)
SODIUM SERPL-SCNC: 141 MMOL/L (ref 136–145)
SODIUM SERPL-SCNC: 143 MMOL/L (ref 136–145)

## 2022-01-15 RX ORDER — DEXTROSE MONOHYDRATE 50 MG/ML
INJECTION, SOLUTION INTRAVENOUS CONTINUOUS
Status: ACTIVE | OUTPATIENT
Start: 2022-01-15 | End: 2022-01-15

## 2022-01-15 NOTE — PLAN OF CARE
Assume care at 1930  D5W restarted, and electrolytes replaced per nephrology  Abx given   Family at bedside  Needs met will continue to monitor

## 2022-01-15 NOTE — PROGRESS NOTES
[facility]    Nephrology Progress Note    Kev Aguiar Attending:  Sharee Huggins MD       SUBJECTIVE:     Daughter reports some oral intake. Pt arousable, moaning at times.     PHYSICAL EXAM:     Vital Signs: /34 (BP Location: Right arm)   Pulse NEPRELIM 4.80 01/14/2022    NEPERCENT 71.2 01/14/2022    LYPERCENT 13.4 01/14/2022    MOPERCENT 8.9 01/14/2022    EOPERCENT 4.9 01/14/2022    BAPERCENT 0.3 01/14/2022    NE 4.80 01/14/2022    LYMABS 0.90 (L) 01/14/2022    MOABSO 0.60 01/14/2022    EOABSO 0 79 yo F with history of HTN, HL, GERD presented with fall complicated by [facility] and SDH. Also found to have UTI with urinary retention and low blood pressures with VERENICE to Cr 1.77 on admission, s/p anderson, IV fluids and now on unasyn.  Nephrology consulted

## 2022-01-15 NOTE — PROGRESS NOTES
[facility]     Hospitalist Progress Note     Dl Fine Patient Status:  Inpatient    1928 MRN [id number]   [facility] 6NE-A Attending Toño Johnson MD   Hosp Day # 6 PCP Mario Alberto Iglesias MD     Chief Complaint: fall traumatic --    AST 25  --   --   --   --    ALT 22  --   --   --   --    BILT 0.2  --   --   --   --    TP 7.1  --   --   --   --     < > = values in this interval not displayed. Estimated Creatinine Clearance: 33.3 mL/min (based on SCr of 0.81 mg/dL).     R diagnosis of diabetes  -A1c noted to be 5.8  -SSI  -Monitor ACH S Accu-Cheks       #GERD  #Hyperlipidemia  #Age-related memory loss     #Nutrition  SLP evaluated patient and okayed to have puréed and nectar thick      Dispo: inpt,            Quality:  · DV

## 2022-01-15 NOTE — PROGRESS NOTES
01/14/22 1940   Clinical Encounter Type   Visited With Health care provider   Routine Visit Follow-up   Continue Visiting No   Scanned POLST form signed by medical practitioner.

## 2022-01-15 NOTE — PLAN OF CARE
Assumed care at 0700  Patient alert, oriented to self at times  Drowsy at times  IV fluids adjusted. Stopped this afternoon  Labs ordered for this evening.  Lab notified  Hydralazine held to keep systolic bp > 916  Daughter at bedside to translate   No acut

## 2022-01-16 LAB
ALBUMIN SERPL-MCNC: 1.8 G/DL (ref 3.4–5)
ANION GAP SERPL CALC-SCNC: 3 MMOL/L (ref 0–18)
BUN BLD-MCNC: 20 MG/DL (ref 7–18)
CALCIUM BLD-MCNC: 8.6 MG/DL (ref 8.5–10.1)
CHLORIDE SERPL-SCNC: 116 MMOL/L (ref 98–112)
CO2 SERPL-SCNC: 22 MMOL/L (ref 21–32)
CREAT BLD-MCNC: 0.78 MG/DL
GLUCOSE BLD-MCNC: 108 MG/DL (ref 70–99)
GLUCOSE BLD-MCNC: 113 MG/DL (ref 70–99)
GLUCOSE BLD-MCNC: 123 MG/DL (ref 70–99)
GLUCOSE BLD-MCNC: 127 MG/DL (ref 70–99)
GLUCOSE BLD-MCNC: 130 MG/DL (ref 70–99)
OSMOLALITY SERPL CALC.SUM OF ELEC: 295 MOSM/KG (ref 275–295)
PHOSPHATE SERPL-MCNC: 3.1 MG/DL (ref 2.5–4.9)
POTASSIUM SERPL-SCNC: 4.3 MMOL/L (ref 3.5–5.1)
SODIUM SERPL-SCNC: 141 MMOL/L (ref 136–145)

## 2022-01-16 RX ORDER — BISACODYL 10 MG
10 SUPPOSITORY, RECTAL RECTAL
Status: DISCONTINUED | OUTPATIENT
Start: 2022-01-16 | End: 2022-01-24

## 2022-01-16 NOTE — PROGRESS NOTES
[facility]    Nephrology Progress Note    Mammie Or Attending:  Helen Stephenson MD       SUBJECTIVE:     Patient's daughter is at bedside helping to feed her. PHYSICAL EXAM:     Vital Signs: /54 (BP Location: Right arm)   Pulse 103   Temp 98. NEPERCENT 71.2 01/14/2022    LYPERCENT 13.4 01/14/2022    MOPERCENT 8.9 01/14/2022    EOPERCENT 4.9 01/14/2022    BAPERCENT 0.3 01/14/2022    NE 4.80 01/14/2022    LYMABS 0.90 (L) 01/14/2022    MOABSO 0.60 01/14/2022    EOABSO 0.33 01/14/2022    BAABSO 0.0 pressures with VERENICE to Cr 1.77 on admission, s/p anderson, IV fluids and now on unasyn.  Nephrology consulted for hypernatremia.     Hypernatremia: dehydration  -- stopped D5W yesterday and has been able to maintain Na levels within normal limits with oral [place]

## 2022-01-16 NOTE — PLAN OF CARE
Assumed care at 1930  Pt alert, RA, NSR on tele  BP w/ in parameters  Family at bedside  Needs met will continue to monitor

## 2022-01-16 NOTE — PLAN OF CARE
0900  No c/o pain  Awaiting darryn placement, but one that would allow daughter to come with. If no place would allow then family would like to take her home  Ordoñez cath care  Turn every two hours  No BM 1/11, need suppository possibly  Monitoring glucose.  If

## 2022-01-16 NOTE — PROGRESS NOTES
[facility]     Hospitalist Progress Note     Lory Andrews Patient Status:  Inpatient    1928 MRN [id number]   [facility] 6NE-A Attending Keyshawn Lombardi MD   Hosp Day # 7 PCP Ledy Guido MD     Chief Complaint: fall traumatic not displayed. Estimated Creatinine Clearance: 34.6 mL/min (based on SCr of 0.78 mg/dL).     Recent Labs   Lab 01/09/22  1140   PTP 15.2*   INR 1.19            COVID-19 Lab Results    COVID-19  Lab Results   Component Value Date    COVID19 Not Detecte it is safe for pt to go home with daughter who does not grasp severity of illness of patient, daughter seems to be more agreeable to darryn now, referrals have been placed, await further acceptance            Quality:  · DVT Prophylaxis: SCD  · CODE status: D

## 2022-01-17 LAB
ALBUMIN SERPL-MCNC: 1.9 G/DL (ref 3.4–5)
ANION GAP SERPL CALC-SCNC: 5 MMOL/L (ref 0–18)
BUN BLD-MCNC: 25 MG/DL (ref 7–18)
CALCIUM BLD-MCNC: 8.9 MG/DL (ref 8.5–10.1)
CHLORIDE SERPL-SCNC: 114 MMOL/L (ref 98–112)
CO2 SERPL-SCNC: 24 MMOL/L (ref 21–32)
CREAT BLD-MCNC: 0.86 MG/DL
GLUCOSE BLD-MCNC: 116 MG/DL (ref 70–99)
GLUCOSE BLD-MCNC: 124 MG/DL (ref 70–99)
GLUCOSE BLD-MCNC: 128 MG/DL (ref 70–99)
GLUCOSE BLD-MCNC: 146 MG/DL (ref 70–99)
OSMOLALITY SERPL CALC.SUM OF ELEC: 302 MOSM/KG (ref 275–295)
PHOSPHATE SERPL-MCNC: 3.2 MG/DL (ref 2.5–4.9)
POTASSIUM SERPL-SCNC: 4.3 MMOL/L (ref 3.5–5.1)
SODIUM SERPL-SCNC: 143 MMOL/L (ref 136–145)

## 2022-01-17 NOTE — CM/SW NOTE
Spoke with dtrRick, about dc plan. Explained that 20+ [facility] referrals made, some are not accepting due to request of family staying with pt due to language barrier and pt forgetfulness. Encouraged back up plan for [facility] with appropriate DME equipment.

## 2022-01-17 NOTE — DIETARY NOTE
[facility]   CLINICAL NUTRITION    Dima Rudolph     Admitting diagnosis:  Subarachnoid hemorrhage ([address] [I60.9]    Ht:  4'9\"  Wt: 48.6 kg (107 lb 2.3 oz). Body mass index is 23.19 kg/m².   IBW: 42kg    Labs/Meds reviewed    Diet: Orders Placed This

## 2022-01-17 NOTE — CONSULTS
Seen and examined. Consult note to shortly follow  Lethargic on my exam. Dependent for therapy  Agree with therapists that [facility] is most appropriate option at this time.

## 2022-01-17 NOTE — PLAN OF CARE
Received patient on bed, awake, [place] speaking only with daughter at bedside. Denied any pain, no SOB noted. ST on tele. Ordoñez draining clear, yellow urine. Discussed POC with daughter. Due meds given. Aspiration precaution maintained.  Turned and re visually impaired, hearing impaired and aphasic patients to use assistive/communication devices  Outcome: Progressing     Problem: Impaired Functional Mobility  Goal: Achieve highest/safest level of mobility/gait  Description: Interventions:  - Assess casey

## 2022-01-17 NOTE — PLAN OF CARE
Assumed patient care at 0730.  VSS   Neuro assessment limited, lethargic   Adequate oral intake   Daughter at bedside, requesting PMR eval  AR denied, SW notified, DME referrals for home   Will continue to monitor

## 2022-01-17 NOTE — CM/SW NOTE
Sw extended referral to more SARs. Included that pt family requesting they stay with her for translation.      Jacques 106, LSW

## 2022-01-17 NOTE — PROGRESS NOTES
[facility]     Hospitalist Progress Note     Iantha Sacks Patient Status:  Inpatient    1928 MRN [id number]   [facility] 6NE-A Attending Malou Ash MD   Hosp Day # 8 PCP Jocelyn Tolliver MD     Chief Complaint: fall traumatic COVID-19 Lab Results    COVID-19  Lab Results   Component Value Date    COVID19 Not Detected 01/09/2022       Pro-Calcitonin  No results for input(s): PCT in the last 168 hours. Cardiac  No results for input(s): TROP, PBNP in the last 168 hours. to stay with pt at [facility] which may complicate acceptance, referrals have been placed            Quality:  · DVT Prophylaxis: SCD  · CODE status: DNAR/select

## 2022-01-17 NOTE — PHYSICAL THERAPY NOTE
PHYSICAL THERAPY TREATMENT NOTE - INPATIENT    Room Number: 5468/2081-D     Session: 3    Number of Visits to Meet Established Goals: 8    Presenting Problem: subarachnoid hemorrhage and L occipital laceration from fall  Co-Morbidities : age-related mem training;Balance training  Rehab Potential : Fair  Frequency (Obs): 3-5x/week    CURRENT GOALS     Goal #1 Patient is able to demonstrate supine - sit EOB @ level: moderate assistance      Goal #2 Patient is able to demonstrate transfers Sit to/from Stand ABILITY STATUS  Gait Assessment   Functional Mobility/Gait Assessment  Gait Assistance: Not tested  Distance (ft): 0  Assistive Device: Rolling walker  Pattern: Shuffle (flexed knees, hip flexion)    Skilled Therapy Provided    Bed Mobility:  Rolling right

## 2022-01-18 ENCOUNTER — APPOINTMENT (OUTPATIENT)
Dept: GENERAL RADIOLOGY | Facility: HOSPITAL | Age: 87
DRG: 871 | End: 2022-01-18
Attending: INTERNAL MEDICINE
Payer: MEDICARE

## 2022-01-18 LAB
ALBUMIN SERPL-MCNC: 1.7 G/DL (ref 3.4–5)
ANION GAP SERPL CALC-SCNC: 5 MMOL/L (ref 0–18)
BUN BLD-MCNC: 33 MG/DL (ref 7–18)
CALCIUM BLD-MCNC: 8.9 MG/DL (ref 8.5–10.1)
CHLORIDE SERPL-SCNC: 115 MMOL/L (ref 98–112)
CO2 SERPL-SCNC: 25 MMOL/L (ref 21–32)
CREAT BLD-MCNC: 0.92 MG/DL
GLUCOSE BLD-MCNC: 100 MG/DL (ref 70–99)
GLUCOSE BLD-MCNC: 100 MG/DL (ref 70–99)
GLUCOSE BLD-MCNC: 110 MG/DL (ref 70–99)
GLUCOSE BLD-MCNC: 117 MG/DL (ref 70–99)
GLUCOSE BLD-MCNC: 153 MG/DL (ref 70–99)
GLUCOSE BLD-MCNC: 88 MG/DL (ref 70–99)
OSMOLALITY SERPL CALC.SUM OF ELEC: 307 MOSM/KG (ref 275–295)
PHOSPHATE SERPL-MCNC: 3.4 MG/DL (ref 2.5–4.9)
POTASSIUM SERPL-SCNC: 4.5 MMOL/L (ref 3.5–5.1)
SODIUM SERPL-SCNC: 145 MMOL/L (ref 136–145)

## 2022-01-18 PROCEDURE — 74018 RADEX ABDOMEN 1 VIEW: CPT | Performed by: INTERNAL MEDICINE

## 2022-01-18 RX ORDER — HYDRALAZINE HYDROCHLORIDE 25 MG/1
25 TABLET, FILM COATED ORAL EVERY 8 HOURS SCHEDULED
Qty: 90 TABLET | Refills: 0 | Status: SHIPPED | OUTPATIENT
Start: 2022-01-18 | End: 2022-01-25 | Stop reason: DRUGHIGH

## 2022-01-18 NOTE — PROGRESS NOTES
[facility]     Hospitalist Progress Note     Aaliyah Reeves Patient Status:  Inpatient    1928 MRN [id number]   [facility] 6NE-A Attending Cheyanne Rosario MD   Hosp Day # 9 PCP Taylor Velazquez MD     Chief Complaint: fall traumatic the last 168 hours. Creatinine Kinase  No results for input(s): CK in the last 168 hours. Inflammatory Markers  No results for input(s): CRP, JAELYN, LDH, DDIMER in the last 168 hours. No results for input(s): TROP, TROPHS, CK in the last 168 hours. DNAR/select

## 2022-01-18 NOTE — CONSULTS
PHYSICAL MEDICINE AND REHABILITATION CONSULTATION       Location [facility] Attending Hossein Walden MD   [facility] Day # 8 PCP Sabrina Anglin MD     Patient Identification  Sophy Pelaez is a 80year old female.   :  1928  Admit Date:  2022 chloride 40 mEq in sodium chloride 0.9% 250 mL IVPB, 40 mEq, Intravenous, Once  Ampicillin-Sulbactam Sodium (UNASYN) 3 g in sodium chloride 0.9% 100 mL IVPB-ADDV, 3 g, Intravenous, Q12H  [COMPLETED] 0.9% NaCl infusion, , Intravenous, Once  [COMPLETED] sodi CATARACT      Both eyes   • CHOLECYSTECTOMY     • FLUOR GID & Guadalupe Villalobos NDL/CATH SPI DX/THER NJX N/A 6/20/2014    Procedure: CERVICAL EPIDURAL INJECTION;  Surgeon: Sudhakar Phillips DO;  Location: [address] NDL/CATH SPI D 8/14/2014    Procedure: CERVICAL EPIDURAL;  Surgeon: Keith Altamirano DO;  Location: [address] [place] [address] [place] [facility] [address] .[facility]  N [facility] 5+ YR N/A 2/26/2015    Procedure: CERVICAL EPIDURAL;  Surgeon: Keith Altamirano DO;  Location: [facility]   • PATIENT [place] PREOPERATIVE ORDER FOR IV ANTIBIOTIC SURGICAL SITE INFECTION PROPHYLAXIS.  N/A 8/14/2014    Procedure: CERVICAL EPIDURAL;  Surgeon: Unique Francis DO;  Location: [address] [place] [address]   • PATIENT [address] [place] [address] appreciated. Extrems: no clubbing/cyanosis noted in digits or nails  Psych: lethargic  Neuro: equivocal babinski. No clonus.    MSK: PROM WNL    Data Review:    Lab Results   Component Value Date    CREATSERUM 0.86 01/17/2022    BUN 25 01/17/2022

## 2022-01-18 NOTE — PLAN OF CARE
Assumed care @0730  VSS,   A&Ox0, unresponsive. Patient speaks Urdu only. Daughter at bedside to communicate and help with eating. RA    NSR ,   Does not perceive to be in Pain,   Bedrest.    Tolerating low fiber pureed nectar thick diet.   Smita i neurological status  - Encourage and assist patient to increase activity and self care with guidance from PT/OT  - Encourage visually impaired, hearing impaired and aphasic patients to use assistive/communication devices  Outcome: Progressing     Problem:

## 2022-01-18 NOTE — SLP NOTE
SPEECH DAILY NOTE - INPATIENT    ASSESSMENT & PLAN   ASSESSMENT  Pt seen for dysphagia tx to assess tolerance with recommended diet, ensure proper utilization of aspiration precautions and provide pt/family education.   Patient daughter present and reported 3    If you have any questions, please contact Carlos Russell MS CCC-SLP  Pager [phone]    Prior to entering room, SLP donned appropriate PPE for Patient level of isolation including gloves, eye protection, and surgical mask.  Patient was n

## 2022-01-18 NOTE — CM/SW NOTE
[facility] and [facility] in [place] both accepted pt for [facility] but both will not let the family stay with pt 24/7. [facility] cannot guarantee a bed but [facility] has a bed and family can visit from 8-6pm.  Gave accepting [facility] list to dtr in room.

## 2022-01-18 NOTE — PLAN OF CARE
Received patient at [address] [place] [address]  Daughter in the room feeding patient. Patient non english speaking but per daughter she is comfortable and doesn't need anything.

## 2022-01-19 LAB
GLUCOSE BLD-MCNC: 109 MG/DL (ref 70–99)
GLUCOSE BLD-MCNC: 136 MG/DL (ref 70–99)
GLUCOSE BLD-MCNC: 164 MG/DL (ref 70–99)
GLUCOSE BLD-MCNC: 164 MG/DL (ref 70–99)

## 2022-01-19 RX ORDER — POLYETHYLENE GLYCOL 3350 17 G/17G
17 POWDER, FOR SOLUTION ORAL DAILY PRN
Qty: 7 PACKET | Refills: 0 | Status: SHIPPED | OUTPATIENT
Start: 2022-01-19 | End: 2022-01-26

## 2022-01-19 RX ORDER — POLYETHYLENE GLYCOL 3350 17 G/17G
17 POWDER, FOR SOLUTION ORAL DAILY
Status: DISCONTINUED | OUTPATIENT
Start: 2022-01-19 | End: 2022-01-24

## 2022-01-19 RX ORDER — SENNA PLUS 8.6 MG/1
8.6 TABLET ORAL 2 TIMES DAILY PRN
Qty: 14 TABLET | Refills: 0 | Status: SHIPPED | OUTPATIENT
Start: 2022-01-19 | End: 2022-01-26

## 2022-01-19 NOTE — PLAN OF CARE
Assumed care at [address] [place] [address] Sq of abdomen completed for abdominal distention  Family at bedside

## 2022-01-19 NOTE — PHYSICAL THERAPY NOTE
PHYSICAL THERAPY TREATMENT NOTE - INPATIENT    Room Number: 7423/0912-C     Session: 4    Number of Visits to Meet Established Goals: 8    Presenting Problem: subarachnoid hemorrhage and L occipital laceration from fall  Co-Morbidities : age-related mem PAIN ASSESSMENT   Rating: Unable to rate          BALANCE                                                                                                                       Static Sitting: Fair -  Dynamic Sitting: Poor +           Static Standing: N were worn.   Patient/Family PPE: Mask not worn by patient or daughter

## 2022-01-19 NOTE — CM/SW NOTE
SW spoke w/daughters in regards to [employer] plan. They stated they had talked to [facility] for awhile and they would like that facility. CARLEY called Pedro Palm at that facility and she stated they can not accept the pt.  [employer] also can no [facility] [facility]

## 2022-01-19 NOTE — PROGRESS NOTES
[facility]     Hospitalist Progress Note     Rachel Toro Patient Status:  Inpatient    1928 MRN [id number]   [facility] 6NE-A Attending Melissa Gipson MD   Hosp Day # 10 PCP Jory Slaughter MD     Chief Complaint: fall traumati Kinase  No results for input(s): CK in the last 168 hours. Inflammatory Markers  No results for input(s): CRP, JAELYN, LDH, DDIMER in the last 168 hours. No results for input(s): TROP, TROPHS, CK in the last 168 hours.     Imaging: Imaging data reviewed allowed visitors 8amto 6pm,  pt not accepted for acute rehab as too functionally impaired, awaiting family decision            Quality:  · DVT Prophylaxis: SCD  · CODE status: DNAR/select

## 2022-01-20 ENCOUNTER — APPOINTMENT (OUTPATIENT)
Dept: ULTRASOUND IMAGING | Facility: HOSPITAL | Age: 87
DRG: 871 | End: 2022-01-20
Attending: HOSPITALIST
Payer: MEDICARE

## 2022-01-20 LAB
GLUCOSE BLD-MCNC: 139 MG/DL (ref 70–99)
GLUCOSE BLD-MCNC: 146 MG/DL (ref 70–99)
GLUCOSE BLD-MCNC: 176 MG/DL (ref 70–99)

## 2022-01-20 PROCEDURE — 93970 EXTREMITY STUDY: CPT | Performed by: HOSPITALIST

## 2022-01-20 RX ORDER — SODIUM PHOSPHATE, DIBASIC AND SODIUM PHOSPHATE, MONOBASIC 7; 19 G/133ML; G/133ML
1 ENEMA RECTAL ONCE AS NEEDED
Status: ACTIVE | OUTPATIENT
Start: 2022-01-20 | End: 2022-01-20

## 2022-01-20 NOTE — OCCUPATIONAL THERAPY NOTE
OCCUPATIONAL THERAPY TREATMENT NOTE - INPATIENT     Room Number: 4716/7502-X  Session: 2   Number of Visits to Meet Established Goals: 5    History: Patient is a 80year old female admitted on 1/9/2022 with Presenting Problem: SAH. PMH of decreased memory. comments: Patient received in supine, opening eyes after cool washcloth to face; max assist for supine to sit; attempted to engage patient in dynamic reaching tasks sitting EOB, following <10% simple commands, requiring hand-over-hand for initiation; attem in progress  Patient will perform toileting: with supervision --> in progress     Functional Transfer Goals  Patient will transfer from supine to sit:  with supervision --> in progress  Patient will transfer from sit to stand:  with supervision --> in prog

## 2022-01-20 NOTE — PROGRESS NOTES
[facility]     Hospitalist Progress Note     Anthony Brantley Patient Status:  Inpatient    1928 MRN [id number]   [facility] 6NE-A Attending Rut Carpio MD   [facility] Day # 6 PCP Trace Cotto MD     Chief Complaint: fall traumati Aspart Pen  1-5 Units Subcutaneous TID CC and HS           Assessment & Plan:    80year old female with significant past medical history of GERD, hypertension, hyperlipidemia, age-related memory loss, presented with fall.      # Acute blood loss anemia: 1/

## 2022-01-20 NOTE — PHYSICAL THERAPY NOTE
[facility]    PHYSICAL THERAPY TREATMENT NOTE - INPATIENT    Room Number: 3800/5578-B     Session: 4    Number of Visits to Meet Established Goals: 8    Presenting Problem: subarachnoid hemorrhage and L occipital laceration from fall  Co-Morbidities : age-related me PAIN ASSESSMENT   Rating: Unable to rate          BALANCE                                                                                                                       Static Sitting: Fair -  Dynamic Sitting: Poor +           Static Standing: P present    Therapist PPE: Mask, gloves and goggles were worn.   Patient/Family PPE: Mask not worn by patient or daughter

## 2022-01-20 NOTE — PLAN OF CARE
Assumed patient care at 0730  No new neurological changes  Opening eyes  Good appetite and PO intake  No BM  Ordoñez intact  Discharge planning in progress    Plan of care discussed with patient, daughter, and physicians.

## 2022-01-20 NOTE — PLAN OF CARE
Assumed patient care at 0730.   VSS   Neuro assessment limited   Alert self, daughter at bedside translating   Adequate oral intake   Abdomen soft, distended   Suppository given,with large BM this afternoon   SW discussing DC disposition options   US BETO neg

## 2022-01-20 NOTE — CM/SW NOTE
Informed daughter SW spoke w/the MD and the pt was ready to dc. Daughter stating the MD told her she could talk to her family tonight. Encouraged family to have this discussion earlier in the day.  Informed her there are two accepting facilities and those w

## 2022-01-20 NOTE — PLAN OF CARE
Assumed care at 1930   No acute neuro changes   Ordoñez intact  No BM   poc discussed with daughter at bedside  Call light in reach, pt resting comfortably, will continue to monitor

## 2022-01-21 LAB
ANION GAP SERPL CALC-SCNC: 7 MMOL/L (ref 0–18)
ANTIBODY SCREEN: NEGATIVE
BASOPHILS # BLD AUTO: 0.03 X10(3) UL (ref 0–0.2)
BASOPHILS NFR BLD AUTO: 0.3 %
BILIRUB UR QL STRIP.AUTO: NEGATIVE
BUN BLD-MCNC: 35 MG/DL (ref 7–18)
CALCIUM BLD-MCNC: 9.6 MG/DL (ref 8.5–10.1)
CHLORIDE SERPL-SCNC: 118 MMOL/L (ref 98–112)
CLARITY UR REFRACT.AUTO: CLEAR
CO2 SERPL-SCNC: 23 MMOL/L (ref 21–32)
COLOR UR AUTO: YELLOW
CREAT BLD-MCNC: 1.17 MG/DL
DEPRECATED HBV CORE AB SER IA-ACNC: 121.8 NG/ML
EOSINOPHIL # BLD AUTO: 0.01 X10(3) UL (ref 0–0.7)
EOSINOPHIL NFR BLD AUTO: 0.1 %
ERYTHROCYTE [DISTWIDTH] IN BLOOD BY AUTOMATED COUNT: 18.2 %
GLUCOSE BLD-MCNC: 145 MG/DL (ref 70–99)
GLUCOSE BLD-MCNC: 167 MG/DL (ref 70–99)
GLUCOSE BLD-MCNC: 179 MG/DL (ref 70–99)
GLUCOSE BLD-MCNC: 190 MG/DL (ref 70–99)
GLUCOSE BLD-MCNC: 200 MG/DL (ref 70–99)
GLUCOSE UR STRIP.AUTO-MCNC: 50 MG/DL
HCT VFR BLD AUTO: 22.1 %
HGB BLD-MCNC: 6.5 G/DL
HGB BLD-MCNC: 6.7 G/DL
HGB BLD-MCNC: 8.5 G/DL
HYALINE CASTS #/AREA URNS AUTO: PRESENT /LPF
IMM GRANULOCYTES # BLD AUTO: 0.13 X10(3) UL (ref 0–1)
IMM GRANULOCYTES NFR BLD: 1.3 %
IRON SATN MFR SERPL: 11 %
IRON SERPL-MCNC: 37 UG/DL
KETONES UR STRIP.AUTO-MCNC: NEGATIVE MG/DL
LEUKOCYTE ESTERASE UR QL STRIP.AUTO: NEGATIVE
LYMPHOCYTES # BLD AUTO: 0.82 X10(3) UL (ref 1–4)
LYMPHOCYTES NFR BLD AUTO: 8.1 %
MCH RBC QN AUTO: 30.5 PG (ref 26–34)
MCHC RBC AUTO-ENTMCNC: 30.3 G/DL (ref 31–37)
MCV RBC AUTO: 100.5 FL
MONOCYTES # BLD AUTO: 0.46 X10(3) UL (ref 0.1–1)
MONOCYTES NFR BLD AUTO: 4.5 %
NEUTROPHILS # BLD AUTO: 8.73 X10 (3) UL (ref 1.5–7.7)
NEUTROPHILS # BLD AUTO: 8.73 X10(3) UL (ref 1.5–7.7)
NEUTROPHILS NFR BLD AUTO: 85.7 %
NITRITE UR QL STRIP.AUTO: NEGATIVE
OSMOLALITY SERPL CALC.SUM OF ELEC: 317 MOSM/KG (ref 275–295)
PH UR STRIP.AUTO: 6 [PH] (ref 5–8)
PLATELET # BLD AUTO: 524 10(3)UL (ref 150–450)
POTASSIUM SERPL-SCNC: 4.4 MMOL/L (ref 3.5–5.1)
PROT UR STRIP.AUTO-MCNC: NEGATIVE MG/DL
RBC # BLD AUTO: 2.2 X10(6)UL
RBC UR QL AUTO: NEGATIVE
RH BLOOD TYPE: POSITIVE
SODIUM SERPL-SCNC: 148 MMOL/L (ref 136–145)
SP GR UR STRIP.AUTO: 1.01 (ref 1–1.03)
TIBC SERPL-MCNC: 326 UG/DL (ref 240–450)
TRANSFERRIN SERPL-MCNC: 219 MG/DL (ref 200–360)
UROBILINOGEN UR STRIP.AUTO-MCNC: <2 MG/DL
WBC # BLD AUTO: 10.2 X10(3) UL (ref 4–11)

## 2022-01-21 RX ORDER — SODIUM CHLORIDE 9 MG/ML
INJECTION, SOLUTION INTRAVENOUS ONCE
Status: COMPLETED | OUTPATIENT
Start: 2022-01-21 | End: 2022-01-21

## 2022-01-21 RX ORDER — SODIUM PHOSPHATE, DIBASIC AND SODIUM PHOSPHATE, MONOBASIC 7; 19 G/133ML; G/133ML
1 ENEMA RECTAL ONCE
Status: DISCONTINUED | OUTPATIENT
Start: 2022-01-21 | End: 2022-01-24

## 2022-01-21 RX ORDER — SODIUM CHLORIDE 450 MG/100ML
INJECTION, SOLUTION INTRAVENOUS CONTINUOUS
Status: ACTIVE | OUTPATIENT
Start: 2022-01-21 | End: 2022-01-21

## 2022-01-21 NOTE — CM/SW NOTE
SW on phone with family for 1.5 hours throughout the day. Family keeps calling multiple facilities that are not even options stating the facilities can accept. Daughter insisting [facility] can accept the pt and let her stay 24/7.  Informed her that weekend.

## 2022-01-21 NOTE — PROGRESS NOTES
[facility]     Hospitalist Progress Note     Rachel Toro Patient Status:  Inpatient    1928 MRN [id number]   [facility] 6NE-A Attending Melissa Gipson MD   Hosp Day # 12 PCP Jory Slaughter MD     Chief Complaint: fall traumati last 168 hours. Imaging: Imaging data reviewed in Epic.     Medications:   • fleet enema  1 enema Rectal Once   • polyethylene glycol (PEG 3350)  17 g Oral Daily   • hydrALAZINE  25 mg Oral Q8H [facility]   • sennosides  8.6 mg Oral BID   • Insulin Aspart Pen Urinary retention: has anderson in place, keep in discharge, has had difficult time in past placing anderson needing urology per daughter  - family advised to contact urology office at time of discharge to schedule an appt with [facility] for voiding trial within

## 2022-01-21 NOTE — CM/SW NOTE
Per unit SW, dtr requesting discharge home w/DME. Orders and documentation placed, page to Dr Teresa Nettles for [facility]. Call to Anika at [facility], they are unable to deliver on weekends.  Call to denzel at [address] [place] [address], they can arrange for delivery once order is received by

## 2022-01-21 NOTE — CM/SW NOTE
SW on phone with family for an additional 45 mins. Informed daughter. Informed the shower bench will not be covered. Daughter asking for all sorts of additional DME Gait belt, slide board, transfer commode, ect. . Informed her none of that DME is covered

## 2022-01-21 NOTE — CM/SW NOTE
Patient requires a semi-electric hospital bed at home because patient requires head to be elevated at least 30 degrees due to subarachnoid hemorrhage, dysphagia with risk of aspiration and requires frequent and/or immediate changes in body position that th

## 2022-01-21 NOTE — PLAN OF CARE
Assumed pt care at 0730  VSS, A&Ox1  Daughter bedside translating  Hbg 6.5, 1 units PRBC's infusing  Plan to dc home with [facility] and equipment, Monday?   POC discussed with dtr  Needs attended to

## 2022-01-21 NOTE — PLAN OF CARE
Assumed care at [address] [place], [place] [address]vd to self  Neuro assessment limited, no new deficits   Tele: Sinus Tach  RA  Adequate anderson output       POC discussed with daughter

## 2022-01-22 LAB
ANION GAP SERPL CALC-SCNC: 5 MMOL/L (ref 0–18)
BASOPHILS # BLD AUTO: 0.03 X10(3) UL (ref 0–0.2)
BASOPHILS NFR BLD AUTO: 0.3 %
BUN BLD-MCNC: 40 MG/DL (ref 7–18)
CALCIUM BLD-MCNC: 9 MG/DL (ref 8.5–10.1)
CHLORIDE SERPL-SCNC: 126 MMOL/L (ref 98–112)
CO2 SERPL-SCNC: 23 MMOL/L (ref 21–32)
CREAT BLD-MCNC: 1.09 MG/DL
EOSINOPHIL # BLD AUTO: 0.02 X10(3) UL (ref 0–0.7)
EOSINOPHIL NFR BLD AUTO: 0.2 %
ERYTHROCYTE [DISTWIDTH] IN BLOOD BY AUTOMATED COUNT: 18.5 %
GLUCOSE BLD-MCNC: 119 MG/DL (ref 70–99)
GLUCOSE BLD-MCNC: 127 MG/DL (ref 70–99)
GLUCOSE BLD-MCNC: 158 MG/DL (ref 70–99)
GLUCOSE BLD-MCNC: 172 MG/DL (ref 70–99)
GLUCOSE BLD-MCNC: 177 MG/DL (ref 70–99)
GLUCOSE BLD-MCNC: 184 MG/DL (ref 70–99)
HCT VFR BLD AUTO: 26.6 %
HGB BLD-MCNC: 8.1 G/DL
IMM GRANULOCYTES # BLD AUTO: 0.11 X10(3) UL (ref 0–1)
IMM GRANULOCYTES NFR BLD: 1 %
LYMPHOCYTES # BLD AUTO: 0.78 X10(3) UL (ref 1–4)
LYMPHOCYTES NFR BLD AUTO: 7.3 %
MCH RBC QN AUTO: 29.6 PG (ref 26–34)
MCHC RBC AUTO-ENTMCNC: 30.5 G/DL (ref 31–37)
MCV RBC AUTO: 97.1 FL
MONOCYTES # BLD AUTO: 0.73 X10(3) UL (ref 0.1–1)
MONOCYTES NFR BLD AUTO: 6.8 %
NEUTROPHILS # BLD AUTO: 9.01 X10 (3) UL (ref 1.5–7.7)
NEUTROPHILS # BLD AUTO: 9.01 X10(3) UL (ref 1.5–7.7)
NEUTROPHILS NFR BLD AUTO: 84.4 %
OSMOLALITY SERPL CALC.SUM OF ELEC: 329 MOSM/KG (ref 275–295)
PLATELET # BLD AUTO: 465 10(3)UL (ref 150–450)
POTASSIUM SERPL-SCNC: 4.1 MMOL/L (ref 3.5–5.1)
RBC # BLD AUTO: 2.74 X10(6)UL
SODIUM SERPL-SCNC: 152 MMOL/L (ref 136–145)
SODIUM SERPL-SCNC: 154 MMOL/L (ref 136–145)
WBC # BLD AUTO: 10.7 X10(3) UL (ref 4–11)

## 2022-01-22 RX ORDER — DEXTROSE MONOHYDRATE 50 MG/ML
INJECTION, SOLUTION INTRAVENOUS CONTINUOUS
Status: DISCONTINUED | OUTPATIENT
Start: 2022-01-22 | End: 2022-01-23

## 2022-01-22 NOTE — PROGRESS NOTES
Janice Hospitalist Progress Note     [facility]      SUBJECTIVE:  Drowsy  Worsening Na    OBJECTIVE:  Temp:  [98.4 °F (36.9 °C)-99.5 °F (37.5 °C)] 99.3 °F (37.4 °C)  Pulse:  [] 110  Resp:  [14-18] 16  BP: (138-167)/(52-83) 148/71    Intake/Outpu 7-19 GM/118ML enema 133 mL, 1 enema, Rectal, Once  polyethylene glycol (PEG 3350) (MIRALAX) powder packet 17 g, 17 g, Oral, Daily  bisacodyl (DULCOLAX) rectal suppository 10 mg, 10 mg, Rectal, Daily PRN  hydrALAzine HCl (APRESOLINE) injection 10 mg, 10 mg, of aggressive care vs possible hospice and comfort focused care.  Daughter would like to see how she responds to fluids     #Head trauma  #Subarachnoid subdural hemorrhage   -keppra c/o 7 days  -Repeat CT stable findingws  sbp goal<150   -laceration well he

## 2022-01-22 NOTE — CM/SW NOTE
Referral sent to E via aidin for requested DME - hospital bed, 3 in 1 commode, transport wheelchair and dino lift. Await response/approval and delivery date/time.     [place] state medicaid questionnaire forms for bed and wc left on paper chart for md mcneal

## 2022-01-22 NOTE — PLAN OF CARE
Assumed care at [address] [place] [address] on tele, RA  AOx1  Daughter at bedside  1 unit PRBC infused, repeat hgb 8.5  POC discussed with daughter  Pt resting comfortably

## 2022-01-22 NOTE — PLAN OF CARE
Assumed care around 0100. Daughter at bedside. A/O x1. Oriented to self only. RA. ST on tele. No visible signs of pain noted. Will DC w/ Ordoñez. Safety precautions in place. Pt needs met. Will continue to monitor.

## 2022-01-23 LAB
ANION GAP SERPL CALC-SCNC: 7 MMOL/L (ref 0–18)
BLOOD TYPE BARCODE: 5100
BUN BLD-MCNC: 41 MG/DL (ref 7–18)
CALCIUM BLD-MCNC: 8.5 MG/DL (ref 8.5–10.1)
CHLORIDE SERPL-SCNC: 118 MMOL/L (ref 98–112)
CO2 SERPL-SCNC: 21 MMOL/L (ref 21–32)
CREAT BLD-MCNC: 1.04 MG/DL
GLUCOSE BLD-MCNC: 116 MG/DL (ref 70–99)
GLUCOSE BLD-MCNC: 173 MG/DL (ref 70–99)
GLUCOSE BLD-MCNC: 180 MG/DL (ref 70–99)
HGB BLD-MCNC: 7.6 G/DL
OSMOLALITY SERPL CALC.SUM OF ELEC: 316 MOSM/KG (ref 275–295)
POTASSIUM SERPL-SCNC: 3.9 MMOL/L (ref 3.5–5.1)
SODIUM SERPL-SCNC: 142 MMOL/L (ref 136–145)
SODIUM SERPL-SCNC: 146 MMOL/L (ref 136–145)

## 2022-01-23 NOTE — PLAN OF CARE
Assumed care at [address] orientation, nonverbal, RA, NSR  No new neuro consults     Family at bedside encouraged fluid intake     Voids by ari anderson   Plan for discharge on Monday with daughter with home health; re-evaluation of hospice/palliative ca

## 2022-01-23 NOTE — PLAN OF CARE
Patient lethargic majority of day. Daughter at bedside and able to arouse patient during meal time. Approx 20% of each meal spoon fed to patient by daughter. Na+ 154 this am- D5W ordered and started at 75cc/hr. Repeat Na+ level pending.  Dr Aide Sneed care  Description: INTERVENTIONS  - Monitor swallowing and airway patency with patient fatigue and changes in neurological status  - Encourage and assist patient to increase activity and self care with guidance from PT/OT  - Encourage visually impaired, he

## 2022-01-23 NOTE — PROGRESS NOTES
[facility] Liaison received palliative referral for community PC services. Waiting to obtain insurance (verification/authorization) prior to pursuing.          Carmen Parker  Residential Palliative Liaison   [phone]

## 2022-01-23 NOTE — DIETARY NOTE
[facility]   CLINICAL NUTRITION    Sal Parker     Admitting diagnosis:  Subarachnoid hemorrhage ([address] [I60.9]    Ht:  4'9\"  Wt: 48.6 kg (107 lb 2.3 oz). Body mass index is 23.19 kg/m².   IBW: 42kg    Labs/Meds reviewed    Diet: Orders Placed This per EMR review. ONS already ordered for nutrient density. Working on discharge dispo    Patient is at low nutrition risk at this time. Please consult if patient status changes or nutrition issues arise.     Sue Wasserman RDN  Clinical Dietitian  Pager

## 2022-01-23 NOTE — PROGRESS NOTES
Janice Hospitalist Progress Note     [facility]      SUBJECTIVE:  Able to eat 80% of breakfast and drink liquids  Awake at times, sleepy at times    OBJECTIVE:  Temp:  [98 °F (36.7 °C)-99.8 °F (37.7 °C)] 99.8 °F (37.7 °C)  Pulse:  [] 102  Resp: Tab, Take 1 tablet (8.6 mg total) by mouth 2 (two) times daily as needed. hydrALAZINE 25 MG Oral Tab, Take 1 tablet (25 mg total) by mouth every 8 (eight) hours.         dextrose 5% infusion, , Intravenous, Continuous  fleet enema (FLEET) 7-19 GM/118ML ricardo is on thickened liquids  - Na better, will decreased fluids. If sodium better this afternoon will try stopping fluids and see if patient can drink enough to maintain sodium.  Per daughter, family thinking most likely they would not want to pursue feeding tu

## 2022-01-24 VITALS
WEIGHT: 107.13 LBS | SYSTOLIC BLOOD PRESSURE: 110 MMHG | TEMPERATURE: 98 F | OXYGEN SATURATION: 97 % | BODY MASS INDEX: 23 KG/M2 | RESPIRATION RATE: 18 BRPM | HEART RATE: 99 BPM | DIASTOLIC BLOOD PRESSURE: 42 MMHG

## 2022-01-24 LAB
ANION GAP SERPL CALC-SCNC: 7 MMOL/L (ref 0–18)
BUN BLD-MCNC: 49 MG/DL (ref 7–18)
CALCIUM BLD-MCNC: 8.4 MG/DL (ref 8.5–10.1)
CHLORIDE SERPL-SCNC: 112 MMOL/L (ref 98–112)
CO2 SERPL-SCNC: 22 MMOL/L (ref 21–32)
CREAT BLD-MCNC: 1.1 MG/DL
GLUCOSE BLD-MCNC: 105 MG/DL (ref 70–99)
GLUCOSE BLD-MCNC: 129 MG/DL (ref 70–99)
GLUCOSE BLD-MCNC: 138 MG/DL (ref 70–99)
GLUCOSE BLD-MCNC: 97 MG/DL (ref 70–99)
HGB BLD-MCNC: 7.5 G/DL
OSMOLALITY SERPL CALC.SUM OF ELEC: 305 MOSM/KG (ref 275–295)
POTASSIUM SERPL-SCNC: 4.4 MMOL/L (ref 3.5–5.1)
SODIUM SERPL-SCNC: 141 MMOL/L (ref 136–145)

## 2022-01-24 NOTE — CM/SW NOTE
HME able to deliver hospital bed/wheelchair between 4-6 pm today and dino lift tomorrow by 1 pm. dtr made aware and agreeable to 7pm dc via ambulance. RN made aware, PCS completed.      Jacques 106, LSW

## 2022-01-24 NOTE — PROGRESS NOTES
Residential Palliative Liaison received palliative referral for community PC services. Liaison spoke with Marisel via phone to discuss Residential PC services. Residential PC services discussed is to our [facility] services upon DC home.       Argentina Akhtar

## 2022-01-24 NOTE — DISCHARGE SUMMARY
General Medicine Discharge Summary     Patient ID:  Eric Leyva  80year old  5/27/1928    Admit date: 1/9/2022    Discharge date and time: 1/24/22    Attending Physician: Robert Bass MD     Primary Care Physician: Lizet Mae MD     Reason for ad improved     #Hyperglycemia without diagnosis of diabetes  -A1c noted to be 5.8     #GERD  #Hyperlipidemia  #Age-related memory loss     # Urinary retention: has anderson in place, keep in at discharge, has had difficult time in past placing anderson needing uro times daily as needed. hydrALAZINE 25 MG Oral Tab  Take 1 tablet (25 mg total) by mouth every 8 (eight) hours. CONTINUE these medications which have NOT CHANGED    famotidine 40 MG Oral Tab  Take 1 tablet (40 mg total) by mouth daily.     Martha Castle

## 2022-01-24 NOTE — CM/SW NOTE
SW received [employer] for DME. CM uploaded and put it in [facility]. Sw called Janiya Oqu[facility] to confirm they received it. Awaiting estimated delivery time. Sw to set up ambulance transport once confirmation of delivery is set.  CARLEY will communicate with dtr once ev

## 2022-01-24 NOTE — PLAN OF CARE
Assumed care at [facility] x1 (Sita Partida), NSR/ST, RA  No new neuro deficits     Daughter by bedside   Encouraged oral intake   Moderate appetite; eating 75% of dinner     Void by vanna anderson   Plan for discharge today if electrolytes stable with daught

## 2022-01-24 NOTE — PROGRESS NOTES
Assumed pt care at 730  Pt drowsy, orientated to self   Daughter at bedside able to feed patient and push liquids   in am rechecked 1600 142  D5W stopped   Lab work to be repeated tomorrow     Sutures removed from left posterior scalp   Pt is resting

## 2022-01-25 NOTE — PLAN OF CARE
NURSING DISCHARGE NOTE    Discharged Home via Ambulance. Accompanied by Support staff  Belongings Taken by patient/family.     Received patient lethargic, RA, NSR on tele at 0730  Neuro status unchanged  Ordoñez in place  No signs of pain  Ok to DC  Disc

## 2022-01-28 ENCOUNTER — LAB REQUISITION (OUTPATIENT)
Dept: LAB | Facility: HOSPITAL | Age: 87
End: 2022-01-28
Payer: MEDICARE

## 2022-01-28 LAB
ANION GAP SERPL CALC-SCNC: 7 MMOL/L (ref 0–18)
BASOPHILS # BLD AUTO: 0.02 X10(3) UL (ref 0–0.2)
BASOPHILS NFR BLD AUTO: 0.4 %
BUN BLD-MCNC: 33 MG/DL (ref 7–18)
CALCIUM BLD-MCNC: 8.3 MG/DL (ref 8.5–10.1)
CHLORIDE SERPL-SCNC: 113 MMOL/L (ref 98–112)
CO2 SERPL-SCNC: 21 MMOL/L (ref 21–32)
CREAT BLD-MCNC: 0.93 MG/DL
EOSINOPHIL # BLD AUTO: 0.24 X10(3) UL (ref 0–0.7)
EOSINOPHIL NFR BLD AUTO: 4.4 %
ERYTHROCYTE [DISTWIDTH] IN BLOOD BY AUTOMATED COUNT: 17.4 %
GLUCOSE BLD-MCNC: 86 MG/DL (ref 70–99)
HCT VFR BLD AUTO: 26.1 %
HGB BLD-MCNC: 8 G/DL
IMM GRANULOCYTES # BLD AUTO: 0.03 X10(3) UL (ref 0–1)
IMM GRANULOCYTES NFR BLD: 0.6 %
LYMPHOCYTES # BLD AUTO: 0.66 X10(3) UL (ref 1–4)
LYMPHOCYTES NFR BLD AUTO: 12.2 %
MCH RBC QN AUTO: 29.7 PG (ref 26–34)
MCHC RBC AUTO-ENTMCNC: 30.7 G/DL (ref 31–37)
MCV RBC AUTO: 97 FL
MONOCYTES # BLD AUTO: 0.37 X10(3) UL (ref 0.1–1)
MONOCYTES NFR BLD AUTO: 6.8 %
NEUTROPHILS # BLD AUTO: 4.11 X10 (3) UL (ref 1.5–7.7)
NEUTROPHILS # BLD AUTO: 4.11 X10(3) UL (ref 1.5–7.7)
NEUTROPHILS NFR BLD AUTO: 75.6 %
OSMOLALITY SERPL CALC.SUM OF ELEC: 299 MOSM/KG (ref 275–295)
PLATELET # BLD AUTO: 388 10(3)UL (ref 150–450)
POTASSIUM SERPL-SCNC: 4.7 MMOL/L (ref 3.5–5.1)
RBC # BLD AUTO: 2.69 X10(6)UL
SODIUM SERPL-SCNC: 141 MMOL/L (ref 136–145)
WBC # BLD AUTO: 5.4 X10(3) UL (ref 4–11)

## 2022-01-28 PROCEDURE — 80048 BASIC METABOLIC PNL TOTAL CA: CPT | Performed by: INTERNAL MEDICINE

## 2022-01-28 PROCEDURE — 85025 COMPLETE CBC W/AUTO DIFF WBC: CPT | Performed by: INTERNAL MEDICINE

## 2022-02-15 ENCOUNTER — APPOINTMENT (OUTPATIENT)
Dept: NUCLEAR MEDICINE | Facility: HOSPITAL | Age: 87
DRG: 177 | End: 2022-02-15
Attending: EMERGENCY MEDICINE
Payer: MEDICARE

## 2022-02-15 ENCOUNTER — HOSPITAL ENCOUNTER (INPATIENT)
Facility: HOSPITAL | Age: 87
LOS: 6 days | Discharge: HOME HEALTH | DRG: 177 | End: 2022-02-21
Attending: EMERGENCY MEDICINE | Admitting: INTERNAL MEDICINE
Payer: MEDICARE

## 2022-02-15 ENCOUNTER — APPOINTMENT (OUTPATIENT)
Dept: GENERAL RADIOLOGY | Facility: HOSPITAL | Age: 87
DRG: 177 | End: 2022-02-15
Payer: MEDICARE

## 2022-02-15 DIAGNOSIS — E86.0 DEHYDRATION: ICD-10-CM

## 2022-02-15 DIAGNOSIS — N17.9 ACUTE RENAL INJURY (HCC): ICD-10-CM

## 2022-02-15 DIAGNOSIS — R06.00 DYSPNEA, UNSPECIFIED TYPE: ICD-10-CM

## 2022-02-15 DIAGNOSIS — R05.9 COUGH: Primary | ICD-10-CM

## 2022-02-15 PROBLEM — D64.9 ANEMIA: Status: ACTIVE | Noted: 2022-02-15

## 2022-02-15 PROBLEM — R79.89 AZOTEMIA: Status: ACTIVE | Noted: 2022-02-15

## 2022-02-15 LAB
ALBUMIN SERPL-MCNC: 2.7 G/DL (ref 3.4–5)
ALBUMIN/GLOB SERPL: 0.6 {RATIO} (ref 1–2)
ALP LIVER SERPL-CCNC: 64 U/L
ALT SERPL-CCNC: 15 U/L
ANION GAP SERPL CALC-SCNC: 6 MMOL/L (ref 0–18)
AST SERPL-CCNC: 24 U/L (ref 15–37)
ATRIAL RATE: 119 BPM
BASOPHILS # BLD AUTO: 0.06 X10(3) UL (ref 0–0.2)
BASOPHILS NFR BLD AUTO: 0.5 %
BILIRUB SERPL-MCNC: 0.2 MG/DL (ref 0.1–2)
BUN BLD-MCNC: 45 MG/DL (ref 7–18)
CALCIUM BLD-MCNC: 8.9 MG/DL (ref 8.5–10.1)
CHLORIDE SERPL-SCNC: 110 MMOL/L (ref 98–112)
CO2 SERPL-SCNC: 22 MMOL/L (ref 21–32)
CREAT BLD-MCNC: 1.89 MG/DL
D DIMER PPP FEU-MCNC: 1.4 UG/ML FEU (ref ?–0.93)
EOSINOPHIL # BLD AUTO: 0.41 X10(3) UL (ref 0–0.7)
EOSINOPHIL NFR BLD AUTO: 3.6 %
ERYTHROCYTE [DISTWIDTH] IN BLOOD BY AUTOMATED COUNT: 15.5 %
GLOBULIN PLAS-MCNC: 4.6 G/DL (ref 2.8–4.4)
GLUCOSE BLD-MCNC: 165 MG/DL (ref 70–99)
HCT VFR BLD AUTO: 31.2 %
HGB BLD-MCNC: 10 G/DL
IMM GRANULOCYTES # BLD AUTO: 0.07 X10(3) UL (ref 0–1)
IMM GRANULOCYTES NFR BLD: 0.6 %
LYMPHOCYTES # BLD AUTO: 2.07 X10(3) UL (ref 1–4)
LYMPHOCYTES NFR BLD AUTO: 18.4 %
MCH RBC QN AUTO: 29.8 PG (ref 26–34)
MCHC RBC AUTO-ENTMCNC: 32.1 G/DL (ref 31–37)
MCV RBC AUTO: 92.9 FL
MONOCYTES # BLD AUTO: 0.78 X10(3) UL (ref 0.1–1)
MONOCYTES NFR BLD AUTO: 6.9 %
NEUTROPHILS # BLD AUTO: 7.86 X10 (3) UL (ref 1.5–7.7)
NEUTROPHILS # BLD AUTO: 7.86 X10(3) UL (ref 1.5–7.7)
NEUTROPHILS NFR BLD AUTO: 70 %
NT-PROBNP SERPL-MCNC: 66 PG/ML (ref ?–450)
OSMOLALITY SERPL CALC.SUM OF ELEC: 301 MOSM/KG (ref 275–295)
P AXIS: 1 DEGREES
P-R INTERVAL: 184 MS
POTASSIUM SERPL-SCNC: 4.5 MMOL/L (ref 3.5–5.1)
PROCALCITONIN SERPL-MCNC: <0.05 NG/ML (ref ?–0.16)
PROT SERPL-MCNC: 7.3 G/DL (ref 6.4–8.2)
Q-T INTERVAL: 318 MS
QRS DURATION: 92 MS
QTC CALCULATION (BEZET): 447 MS
R AXIS: 12 DEGREES
RBC # BLD AUTO: 3.36 X10(6)UL
SARS-COV-2 RNA RESP QL NAA+PROBE: NOT DETECTED
SODIUM SERPL-SCNC: 138 MMOL/L (ref 136–145)
T AXIS: 83 DEGREES
TROPONIN I HIGH SENSITIVITY: 7 NG/L
WBC # BLD AUTO: 11.3 X10(3) UL (ref 4–11)

## 2022-02-15 PROCEDURE — 71045 X-RAY EXAM CHEST 1 VIEW: CPT

## 2022-02-15 PROCEDURE — 78580 LUNG PERFUSION IMAGING: CPT | Performed by: EMERGENCY MEDICINE

## 2022-02-15 RX ORDER — ONDANSETRON 2 MG/ML
4 INJECTION INTRAMUSCULAR; INTRAVENOUS EVERY 4 HOURS PRN
Status: DISCONTINUED | OUTPATIENT
Start: 2022-02-15 | End: 2022-02-15 | Stop reason: ALTCHOICE

## 2022-02-15 RX ORDER — BENZONATATE 200 MG/1
200 CAPSULE ORAL 3 TIMES DAILY PRN
Status: DISCONTINUED | OUTPATIENT
Start: 2022-02-15 | End: 2022-02-16

## 2022-02-15 RX ORDER — HYDRALAZINE HYDROCHLORIDE 20 MG/ML
10 INJECTION INTRAMUSCULAR; INTRAVENOUS EVERY 4 HOURS PRN
Status: DISCONTINUED | OUTPATIENT
Start: 2022-02-15 | End: 2022-02-21

## 2022-02-15 RX ORDER — METOCLOPRAMIDE HYDROCHLORIDE 5 MG/ML
5 INJECTION INTRAMUSCULAR; INTRAVENOUS EVERY 8 HOURS PRN
Status: DISCONTINUED | OUTPATIENT
Start: 2022-02-15 | End: 2022-02-21

## 2022-02-15 RX ORDER — FAMOTIDINE 20 MG/1
40 TABLET, FILM COATED ORAL DAILY
Status: DISCONTINUED | OUTPATIENT
Start: 2022-02-15 | End: 2022-02-21

## 2022-02-15 RX ORDER — SODIUM CHLORIDE 9 MG/ML
INJECTION, SOLUTION INTRAVENOUS CONTINUOUS
Status: ACTIVE | OUTPATIENT
Start: 2022-02-15 | End: 2022-02-15

## 2022-02-15 RX ORDER — HYDRALAZINE HYDROCHLORIDE 25 MG/1
25 TABLET, FILM COATED ORAL EVERY 12 HOURS
Status: DISCONTINUED | OUTPATIENT
Start: 2022-02-15 | End: 2022-02-21

## 2022-02-15 RX ORDER — ONDANSETRON 2 MG/ML
4 INJECTION INTRAMUSCULAR; INTRAVENOUS EVERY 6 HOURS PRN
Status: DISCONTINUED | OUTPATIENT
Start: 2022-02-15 | End: 2022-02-21

## 2022-02-15 RX ORDER — AZITHROMYCIN 250 MG/1
250 TABLET, FILM COATED ORAL DAILY
COMMUNITY
Start: 2022-02-12 | End: 2022-02-21

## 2022-02-15 RX ORDER — ACETAMINOPHEN 325 MG/1
650 TABLET ORAL EVERY 6 HOURS PRN
Status: DISCONTINUED | OUTPATIENT
Start: 2022-02-15 | End: 2022-02-21

## 2022-02-15 RX ORDER — SODIUM CHLORIDE 9 MG/ML
125 INJECTION, SOLUTION INTRAVENOUS CONTINUOUS
Status: DISCONTINUED | OUTPATIENT
Start: 2022-02-15 | End: 2022-02-17

## 2022-02-15 RX ORDER — SENNOSIDES 8.6 MG
8.6 TABLET ORAL 2 TIMES DAILY
COMMUNITY

## 2022-02-15 NOTE — PROGRESS NOTES
NURSING ADMISSION NOTE      Patient admitted via Cart  Oriented to room. Safety precautions initiated. Bed in low position. Call light in reach. Patient is lethargic, not responding to talk, Daughter at the bedside , reports that is her baseline. She is Bulgarian speaking only. Daughter the POA to stay with her at all times. Skin check done with second RN. Coccyx red , mepilex applied. Heel protectors applied. Sepsis BPA fired on admission, Dr Remi Zimmer notified. She has been on a modified diet at home and per daughter she has been having issues swallowing since the cough started.

## 2022-02-15 NOTE — ED INITIAL ASSESSMENT (HPI)
Pt arrives via EMS with c/o ANT SOB that started this morning. Per daughter, pt has been coughing up a lot of phlegm, and also wheezing.

## 2022-02-15 NOTE — ED QUICK NOTES
20 g IV placed to right wrist. Blood culture obtained from IV site but then IV infiltrated and removed.

## 2022-02-15 NOTE — ED QUICK NOTES
Orders for admission, patient is aware of plan and ready to go upstairs. Any questions, please call ED GERRY Demarco at extension [phone]. Vaccinated?  Yes  Type of COVID test sent: Rapid  COVID Suspicion level: Low      Titratable drug(s) infusing:  Rate: None     LOC at time of transport:    Other pertinent information:    CIWA score=  NIH score=

## 2022-02-16 LAB
ANION GAP SERPL CALC-SCNC: 4 MMOL/L (ref 0–18)
BASOPHILS # BLD AUTO: 0.05 X10(3) UL (ref 0–0.2)
BASOPHILS NFR BLD AUTO: 0.4 %
BUN BLD-MCNC: 38 MG/DL (ref 7–18)
CALCIUM BLD-MCNC: 8.1 MG/DL (ref 8.5–10.1)
CHLORIDE SERPL-SCNC: 116 MMOL/L (ref 98–112)
CO2 SERPL-SCNC: 22 MMOL/L (ref 21–32)
CREAT BLD-MCNC: 1.05 MG/DL
EOSINOPHIL # BLD AUTO: 0.17 X10(3) UL (ref 0–0.7)
EOSINOPHIL NFR BLD AUTO: 1.2 %
ERYTHROCYTE [DISTWIDTH] IN BLOOD BY AUTOMATED COUNT: 15.5 %
GLUCOSE BLD-MCNC: 97 MG/DL (ref 70–99)
HCT VFR BLD AUTO: 25.1 %
HGB BLD-MCNC: 7.8 G/DL
IMM GRANULOCYTES # BLD AUTO: 0.06 X10(3) UL (ref 0–1)
IMM GRANULOCYTES NFR BLD: 0.4 %
LYMPHOCYTES # BLD AUTO: 1.53 X10(3) UL (ref 1–4)
LYMPHOCYTES NFR BLD AUTO: 10.9 %
MCH RBC QN AUTO: 29.4 PG (ref 26–34)
MCHC RBC AUTO-ENTMCNC: 31.1 G/DL (ref 31–37)
MCV RBC AUTO: 94.7 FL
MONOCYTES # BLD AUTO: 0.85 X10(3) UL (ref 0.1–1)
MONOCYTES NFR BLD AUTO: 6.1 %
NEUTROPHILS # BLD AUTO: 11.38 X10 (3) UL (ref 1.5–7.7)
NEUTROPHILS # BLD AUTO: 11.38 X10(3) UL (ref 1.5–7.7)
NEUTROPHILS NFR BLD AUTO: 81 %
OSMOLALITY SERPL CALC.SUM OF ELEC: 303 MOSM/KG (ref 275–295)
PLATELET # BLD AUTO: 281 10(3)UL (ref 150–450)
POTASSIUM SERPL-SCNC: 4.3 MMOL/L (ref 3.5–5.1)
RBC # BLD AUTO: 2.65 X10(6)UL
SODIUM SERPL-SCNC: 142 MMOL/L (ref 136–145)
WBC # BLD AUTO: 14 X10(3) UL (ref 4–11)

## 2022-02-16 PROCEDURE — 99223 1ST HOSP IP/OBS HIGH 75: CPT | Performed by: NURSE PRACTITIONER

## 2022-02-16 RX ORDER — CIPROFLOXACIN 2 MG/ML
400 INJECTION, SOLUTION INTRAVENOUS EVERY 24 HOURS
Status: DISCONTINUED | OUTPATIENT
Start: 2022-02-16 | End: 2022-02-18

## 2022-02-16 RX ORDER — CIPROFLOXACIN 2 MG/ML
400 INJECTION, SOLUTION INTRAVENOUS EVERY 12 HOURS
Status: DISCONTINUED | OUTPATIENT
Start: 2022-02-16 | End: 2022-02-16 | Stop reason: DRUGHIGH

## 2022-02-16 RX ORDER — MULTIPLE VITAMINS W/ MINERALS TAB 9MG-400MCG
1 TAB ORAL DAILY
Status: DISCONTINUED | OUTPATIENT
Start: 2022-02-16 | End: 2022-02-21

## 2022-02-16 RX ORDER — IPRATROPIUM BROMIDE AND ALBUTEROL SULFATE 2.5; .5 MG/3ML; MG/3ML
3 SOLUTION RESPIRATORY (INHALATION) EVERY 4 HOURS PRN
Status: DISCONTINUED | OUTPATIENT
Start: 2022-02-16 | End: 2022-02-17

## 2022-02-16 NOTE — PLAN OF CARE
Pt lethargic, eyes remaining closed. VSS on RA. ST on tele. Denies pain. Bedrest. Frequent repositioning. Incontinent. Brief and purewick in place. Redness noted to sacrum, mepilex changed tonight. BM tonight. Bilateral SCDs and bunny boots. Non productive cough, tessalon given PRN with good relief. IVF infusing as ordered. Reviewed POC with pts daughter at bedside. Will continue to monitor. Plan for palliative care consult tomorrow.

## 2022-02-16 NOTE — CM/SW NOTE
02/16/22 1300   CM/SW Referral Data   Referral Source Social Work (self-referral)   Reason for Referral Discharge planning   Informant Daughter   Patient Status Prior to Admission   Independent with ADLs and Mobility No   MSW met with the patient and daughter at bedside to complete discharge planning assessment. The patient is [place] speaking but is non participatory in conversation currently. The patient is a 80year old female admitted for cough. She lives with her daughter who takes total care of her. They have a dino lift and wheelchair at home. The patient's daughter met with palliative care today and will be discussing options with her siblings tonight. She will have a decision by tomorrow. SW will continue to follow for discharge planning. Estelita Winston LCSW    ADDENDUM:  MSW was notified that the patient is current with [facility]  P:[phone]  K:[phone]. DOUG order entered.

## 2022-02-16 NOTE — PLAN OF CARE
Sepsis BPA alerting when afternoon vital signs entered. Dr. Morgan Hernandez updated and notified of afternoon vitals. Will continue to monitor.

## 2022-02-17 LAB
ALBUMIN SERPL-MCNC: 1.9 G/DL (ref 3.4–5)
ALBUMIN/GLOB SERPL: 0.6 {RATIO} (ref 1–2)
ALP LIVER SERPL-CCNC: 51 U/L
ALT SERPL-CCNC: 12 U/L
ANION GAP SERPL CALC-SCNC: 4 MMOL/L (ref 0–18)
ANION GAP SERPL CALC-SCNC: 5 MMOL/L (ref 0–18)
AST SERPL-CCNC: 14 U/L (ref 15–37)
BASOPHILS # BLD AUTO: 0.02 X10(3) UL (ref 0–0.2)
BASOPHILS # BLD AUTO: 0.04 X10(3) UL (ref 0–0.2)
BASOPHILS NFR BLD AUTO: 0.3 %
BASOPHILS NFR BLD AUTO: 0.4 %
BILIRUB SERPL-MCNC: 0.2 MG/DL (ref 0.1–2)
BUN BLD-MCNC: 21 MG/DL (ref 7–18)
BUN BLD-MCNC: 26 MG/DL (ref 7–18)
CALCIUM BLD-MCNC: 7.5 MG/DL (ref 8.5–10.1)
CALCIUM BLD-MCNC: 7.7 MG/DL (ref 8.5–10.1)
CHLORIDE SERPL-SCNC: 122 MMOL/L (ref 98–112)
CHLORIDE SERPL-SCNC: 123 MMOL/L (ref 98–112)
CO2 SERPL-SCNC: 19 MMOL/L (ref 21–32)
CO2 SERPL-SCNC: 19 MMOL/L (ref 21–32)
CREAT BLD-MCNC: 0.76 MG/DL
CREAT BLD-MCNC: 0.78 MG/DL
EOSINOPHIL # BLD AUTO: 0.11 X10(3) UL (ref 0–0.7)
EOSINOPHIL # BLD AUTO: 0.23 X10(3) UL (ref 0–0.7)
EOSINOPHIL NFR BLD AUTO: 2.9 %
ERYTHROCYTE [DISTWIDTH] IN BLOOD BY AUTOMATED COUNT: 15.3 %
ERYTHROCYTE [DISTWIDTH] IN BLOOD BY AUTOMATED COUNT: 15.5 %
GLOBULIN PLAS-MCNC: 3.4 G/DL (ref 2.8–4.4)
GLUCOSE BLD-MCNC: 121 MG/DL (ref 70–99)
GLUCOSE BLD-MCNC: 123 MG/DL (ref 70–99)
HCT VFR BLD AUTO: 24.3 %
HCT VFR BLD AUTO: 25.5 %
HGB BLD-MCNC: 7.4 G/DL
HGB BLD-MCNC: 8 G/DL
IMM GRANULOCYTES # BLD AUTO: 0.04 X10(3) UL (ref 0–1)
IMM GRANULOCYTES # BLD AUTO: 0.05 X10(3) UL (ref 0–1)
IMM GRANULOCYTES NFR BLD: 0.5 %
IMM GRANULOCYTES NFR BLD: 0.5 %
LACTATE SERPL-SCNC: 1.9 MMOL/L (ref 0.4–2)
LACTATE SERPL-SCNC: 2.5 MMOL/L (ref 0.4–2)
LYMPHOCYTES # BLD AUTO: 1.33 X10(3) UL (ref 1–4)
LYMPHOCYTES # BLD AUTO: 1.33 X10(3) UL (ref 1–4)
LYMPHOCYTES NFR BLD AUTO: 13.2 %
LYMPHOCYTES NFR BLD AUTO: 17 %
MCH RBC QN AUTO: 28.9 PG (ref 26–34)
MCH RBC QN AUTO: 29.3 PG (ref 26–34)
MCHC RBC AUTO-ENTMCNC: 30.5 G/DL (ref 31–37)
MCHC RBC AUTO-ENTMCNC: 31.4 G/DL (ref 31–37)
MCV RBC AUTO: 93.4 FL
MCV RBC AUTO: 94.9 FL
MONOCYTES # BLD AUTO: 0.62 X10(3) UL (ref 0.1–1)
MONOCYTES # BLD AUTO: 0.9 X10(3) UL (ref 0.1–1)
MONOCYTES NFR BLD AUTO: 7.9 %
MONOCYTES NFR BLD AUTO: 8.9 %
NEUTROPHILS # BLD AUTO: 5.58 X10 (3) UL (ref 1.5–7.7)
NEUTROPHILS # BLD AUTO: 5.58 X10(3) UL (ref 1.5–7.7)
NEUTROPHILS # BLD AUTO: 7.66 X10 (3) UL (ref 1.5–7.7)
NEUTROPHILS # BLD AUTO: 7.66 X10(3) UL (ref 1.5–7.7)
NEUTROPHILS NFR BLD AUTO: 71.4 %
NEUTROPHILS NFR BLD AUTO: 75.9 %
OSMOLALITY SERPL CALC.SUM OF ELEC: 306 MOSM/KG (ref 275–295)
OSMOLALITY SERPL CALC.SUM OF ELEC: 308 MOSM/KG (ref 275–295)
PLATELET # BLD AUTO: 258 10(3)UL (ref 150–450)
PLATELET # BLD AUTO: 272 10(3)UL (ref 150–450)
POTASSIUM SERPL-SCNC: 3.9 MMOL/L (ref 3.5–5.1)
POTASSIUM SERPL-SCNC: 4 MMOL/L (ref 3.5–5.1)
PROT SERPL-MCNC: 5.3 G/DL (ref 6.4–8.2)
RBC # BLD AUTO: 2.56 X10(6)UL
RBC # BLD AUTO: 2.73 X10(6)UL
SODIUM SERPL-SCNC: 146 MMOL/L (ref 136–145)
SODIUM SERPL-SCNC: 146 MMOL/L (ref 136–145)
WBC # BLD AUTO: 10.1 X10(3) UL (ref 4–11)
WBC # BLD AUTO: 7.8 X10(3) UL (ref 4–11)

## 2022-02-17 PROCEDURE — 99356 PROLONGED SERV,INPATIENT,1ST HR: CPT | Performed by: NURSE PRACTITIONER

## 2022-02-17 PROCEDURE — 99233 SBSQ HOSP IP/OBS HIGH 50: CPT | Performed by: NURSE PRACTITIONER

## 2022-02-17 RX ORDER — SODIUM CHLORIDE 9 MG/ML
INJECTION, SOLUTION INTRAVENOUS CONTINUOUS
Status: DISCONTINUED | OUTPATIENT
Start: 2022-02-17 | End: 2022-02-19

## 2022-02-17 RX ORDER — IPRATROPIUM BROMIDE AND ALBUTEROL SULFATE 2.5; .5 MG/3ML; MG/3ML
3 SOLUTION RESPIRATORY (INHALATION)
Status: DISCONTINUED | OUTPATIENT
Start: 2022-02-17 | End: 2022-02-19

## 2022-02-17 RX ORDER — IPRATROPIUM BROMIDE AND ALBUTEROL SULFATE 2.5; .5 MG/3ML; MG/3ML
3 SOLUTION RESPIRATORY (INHALATION)
Status: DISCONTINUED | OUTPATIENT
Start: 2022-02-17 | End: 2022-02-17

## 2022-02-17 NOTE — CM/SW NOTE
Informed by Tawny with palliative care that pt's dtr prefers DC plan for return to home with palliative care services. Family is not ready for hospice care at this time. Spoke with Alan Ferreira from [facility] who confirmed pt is current with them. Plan for resumption of [facility] and PC at DC. PT eval pending. / to remain available for support and/or discharge planning.      Jesús Solares, [facility]  Discharge Planner  [phone]

## 2022-02-17 NOTE — PLAN OF CARE
Lethargic, non-verbal. Not moving extremities. Arouses to daughter's voice, eyes remain closed. Swallows when spoon placed in mouth. Aspiration precautions in place. Tolerating pureed diet with nectar thickened liquids. Meds given crushed with applesauce. On room air. Temp 100.3 axillary at 1610. Dr. Roberto Almonte notified, orders received for blood cultures. HR 100s-110s ST on tele. Incontinent of urine and stool. External female catheter in place. BM today. Sacral mepilex boarder changed. Sacrum red and intact. Turning and repositioning with max assist. Palliative Care met with pt and daughter today. Daughter discussing plan of care options with her brother. Plan of care updated with daughter.

## 2022-02-17 NOTE — PLAN OF CARE
Awake & alert, eating breakfast w/ assistance fr daughter, Phuc Denise noted bilat w/productive cough noted, Neb tx requested per daughter, seen by Tawny RN  palliative care-HH w/ palliative care ordered for home upon dc, daughter assts w/ meals & stays at bedside, fall precautions maintained, dc planning per SW.    1732- Low grade temp , HR =114, labs ordered per sepsis protocol , nebs scheduled , Cipro con't for UTI, HH on dc - needs help w/ total care lift at home, daughter updated at bedside.

## 2022-02-18 LAB
ANION GAP SERPL CALC-SCNC: 4 MMOL/L (ref 0–18)
BUN BLD-MCNC: 22 MG/DL (ref 7–18)
CALCIUM BLD-MCNC: 7.7 MG/DL (ref 8.5–10.1)
CHLORIDE SERPL-SCNC: 122 MMOL/L (ref 98–112)
CO2 SERPL-SCNC: 19 MMOL/L (ref 21–32)
CREAT BLD-MCNC: 0.74 MG/DL
GLUCOSE BLD-MCNC: 120 MG/DL (ref 70–99)
POTASSIUM SERPL-SCNC: 4.3 MMOL/L (ref 3.5–5.1)
SODIUM SERPL-SCNC: 145 MMOL/L (ref 136–145)

## 2022-02-18 RX ORDER — METOPROLOL TARTRATE 5 MG/5ML
5 INJECTION INTRAVENOUS EVERY 6 HOURS PRN
Status: DISCONTINUED | OUTPATIENT
Start: 2022-02-18 | End: 2022-02-21

## 2022-02-18 RX ORDER — CIPROFLOXACIN 2 MG/ML
400 INJECTION, SOLUTION INTRAVENOUS EVERY 12 HOURS
Status: DISCONTINUED | OUTPATIENT
Start: 2022-02-18 | End: 2022-02-19

## 2022-02-18 RX ORDER — HEPARIN SODIUM 5000 [USP'U]/ML
5000 INJECTION, SOLUTION INTRAVENOUS; SUBCUTANEOUS EVERY 8 HOURS SCHEDULED
Status: DISCONTINUED | OUTPATIENT
Start: 2022-02-18 | End: 2022-02-18

## 2022-02-18 NOTE — CONSULTS
[facility] Pharmacy Note:  Renal Adjustment for ciprofloxacin (CIPRO)    Robert Torres is a 80year old patient who has been on ciprofloxacin (CIPRO) 400 mg every 24 hrs. The estimated creatinine clearance is 33 mL/min (based on SCr of 0.74 mg/dL). The dose has been adjusted back to thr original order for ciprofloxacin (CIPRO) 400 mg every 12 hrs per hospital renal dose adjustment protocol for treatment of UTI. Pharmacy will follow and adjust dose as warranted for additional renal function changes.     Thank you,    Dimitri Cancino, PharmD  2/18/2022  9:42 AM

## 2022-02-18 NOTE — PLAN OF CARE
Pt does not open eyes, Reponds nonverbally to daughter. RA. Tele- STVitor Moss noted bilateral lung with neb treatment every four hours by RT. Productive cough. Nonpitting to bilateral arms, elevated on pillow. Heal protectors applied. Daughter at bedside. Call light within reach. Safety precaution in place.

## 2022-02-18 NOTE — PLAN OF CARE
Pt is unresponsive to RN. Does somewhat respond to daughter who remains at bedside. On room air. ST mostly 120s  Order for IV metoprolol received. LUngs congested, especially upper airway, mostly unable to clear. Edema to Bilateral UE. Bunny boots for heel protection. Daughter at bedside.

## 2022-02-19 ENCOUNTER — APPOINTMENT (OUTPATIENT)
Dept: GENERAL RADIOLOGY | Facility: HOSPITAL | Age: 87
DRG: 177 | End: 2022-02-19
Attending: INTERNAL MEDICINE
Payer: MEDICARE

## 2022-02-19 LAB
ANION GAP SERPL CALC-SCNC: 6 MMOL/L (ref 0–18)
BASOPHILS # BLD AUTO: 0.05 X10(3) UL (ref 0–0.2)
BASOPHILS NFR BLD AUTO: 0.6 %
BUN BLD-MCNC: 21 MG/DL (ref 7–18)
CALCIUM BLD-MCNC: 7.7 MG/DL (ref 8.5–10.1)
CHLORIDE SERPL-SCNC: 120 MMOL/L (ref 98–112)
CO2 SERPL-SCNC: 18 MMOL/L (ref 21–32)
CREAT BLD-MCNC: 0.75 MG/DL
EOSINOPHIL # BLD AUTO: 0.54 X10(3) UL (ref 0–0.7)
EOSINOPHIL NFR BLD AUTO: 6.3 %
ERYTHROCYTE [DISTWIDTH] IN BLOOD BY AUTOMATED COUNT: 15.9 %
GLUCOSE BLD-MCNC: 104 MG/DL (ref 70–99)
HCT VFR BLD AUTO: 25.1 %
HGB BLD-MCNC: 7.4 G/DL
IMM GRANULOCYTES # BLD AUTO: 0.04 X10(3) UL (ref 0–1)
IMM GRANULOCYTES NFR BLD: 0.5 %
LYMPHOCYTES # BLD AUTO: 1.43 X10(3) UL (ref 1–4)
LYMPHOCYTES NFR BLD AUTO: 16.7 %
MCH RBC QN AUTO: 28.8 PG (ref 26–34)
MCHC RBC AUTO-ENTMCNC: 29.5 G/DL (ref 31–37)
MCV RBC AUTO: 97.7 FL
MONOCYTES # BLD AUTO: 0.81 X10(3) UL (ref 0.1–1)
MONOCYTES NFR BLD AUTO: 9.5 %
NEUTROPHILS # BLD AUTO: 5.7 X10 (3) UL (ref 1.5–7.7)
NEUTROPHILS # BLD AUTO: 5.7 X10(3) UL (ref 1.5–7.7)
NEUTROPHILS NFR BLD AUTO: 66.4 %
OSMOLALITY SERPL CALC.SUM OF ELEC: 301 MOSM/KG (ref 275–295)
PLATELET # BLD AUTO: 322 10(3)UL (ref 150–450)
POTASSIUM SERPL-SCNC: 4.4 MMOL/L (ref 3.5–5.1)
PROCALCITONIN SERPL-MCNC: 0.08 NG/ML (ref ?–0.16)
RBC # BLD AUTO: 2.57 X10(6)UL
SODIUM SERPL-SCNC: 144 MMOL/L (ref 136–145)
WBC # BLD AUTO: 8.6 X10(3) UL (ref 4–11)

## 2022-02-19 PROCEDURE — 71045 X-RAY EXAM CHEST 1 VIEW: CPT | Performed by: INTERNAL MEDICINE

## 2022-02-19 RX ORDER — IPRATROPIUM BROMIDE AND ALBUTEROL SULFATE 2.5; .5 MG/3ML; MG/3ML
3 SOLUTION RESPIRATORY (INHALATION)
Status: DISCONTINUED | OUTPATIENT
Start: 2022-02-19 | End: 2022-02-20

## 2022-02-19 RX ORDER — SODIUM CHLORIDE, SODIUM LACTATE, POTASSIUM CHLORIDE, CALCIUM CHLORIDE 600; 310; 30; 20 MG/100ML; MG/100ML; MG/100ML; MG/100ML
INJECTION, SOLUTION INTRAVENOUS CONTINUOUS
Status: DISCONTINUED | OUTPATIENT
Start: 2022-02-19 | End: 2022-02-19

## 2022-02-19 NOTE — PLAN OF CARE
In bed,HOB ELEVATED,,room air,TELE,st.Daughter at bedside. Responds to daughter. Nebs as ordered,suctioned as needed by RT. Lungs course bilaterally,PRN medication given for cough. Turned q 2 hours as needed. Voids per purewick. Coughing on and off,prn medication given as needed. Fed by [employer] precaution maintained. MEPILEX   to  sacrum intact. Family undecided yet as  to whether put her in hospice. Still hopeful,that the mom will get better.

## 2022-02-19 NOTE — PLAN OF CARE
Patient remains lethargic, responding to daughter only by opening her eyes. She is Andorran speaking only. HR remains elevated, on RA, has a congested cough, with crackles, on scheduled nebs. Edema noted to her left foot and perineal site. Remains on IV Cipro for UTI, will check a PVR today as well. Daughter present at the bedside helping with patient's needs. She has been feeding her pureed diet with nectar thick liquids as recommended by speech. Aspiration precautions maintained. Plan of care discussed with the daughter and verbalized understanding. 1600 patient has been voiding , incontinent, bladder scan done and was 480ml. Discussed with the hospitalist and was strait cath'ed. Got out 450ml of yellow clear urine. Per the hospitalist if retaining again in 6 -8 hours will leave a Ordoñez catheter in. Started on Zosyn for pneumonia after the blood cultures were collected.

## 2022-02-20 ENCOUNTER — APPOINTMENT (OUTPATIENT)
Dept: CV DIAGNOSTICS | Facility: HOSPITAL | Age: 87
DRG: 177 | End: 2022-02-20
Attending: INTERNAL MEDICINE
Payer: MEDICARE

## 2022-02-20 LAB
ANION GAP SERPL CALC-SCNC: 5 MMOL/L (ref 0–18)
BASOPHILS # BLD AUTO: 0.04 X10(3) UL (ref 0–0.2)
BASOPHILS NFR BLD AUTO: 0.5 %
BUN BLD-MCNC: 22 MG/DL (ref 7–18)
CALCIUM BLD-MCNC: 8.1 MG/DL (ref 8.5–10.1)
CHLORIDE SERPL-SCNC: 113 MMOL/L (ref 98–112)
CO2 SERPL-SCNC: 20 MMOL/L (ref 21–32)
EOSINOPHIL # BLD AUTO: 0.52 X10(3) UL (ref 0–0.7)
EOSINOPHIL NFR BLD AUTO: 6.7 %
ERYTHROCYTE [DISTWIDTH] IN BLOOD BY AUTOMATED COUNT: 15.9 %
GLUCOSE BLD-MCNC: 116 MG/DL (ref 70–99)
HCT VFR BLD AUTO: 26 %
HGB BLD-MCNC: 7.8 G/DL
IMM GRANULOCYTES # BLD AUTO: 0.07 X10(3) UL (ref 0–1)
IMM GRANULOCYTES NFR BLD: 0.9 %
LYMPHOCYTES # BLD AUTO: 1.33 X10(3) UL (ref 1–4)
LYMPHOCYTES NFR BLD AUTO: 17.1 %
MCH RBC QN AUTO: 29 PG (ref 26–34)
MCHC RBC AUTO-ENTMCNC: 30 G/DL (ref 31–37)
MCV RBC AUTO: 96.7 FL
MONOCYTES # BLD AUTO: 0.98 X10(3) UL (ref 0.1–1)
MONOCYTES NFR BLD AUTO: 12.6 %
NEUTROPHILS # BLD AUTO: 4.86 X10 (3) UL (ref 1.5–7.7)
NEUTROPHILS # BLD AUTO: 4.86 X10(3) UL (ref 1.5–7.7)
NEUTROPHILS NFR BLD AUTO: 62.2 %
OSMOLALITY SERPL CALC.SUM OF ELEC: 290 MOSM/KG (ref 275–295)
PLATELET # BLD AUTO: 355 10(3)UL (ref 150–450)
POTASSIUM SERPL-SCNC: 4.5 MMOL/L (ref 3.5–5.1)
RBC # BLD AUTO: 2.69 X10(6)UL
SODIUM SERPL-SCNC: 138 MMOL/L (ref 136–145)

## 2022-02-20 PROCEDURE — 93306 TTE W/DOPPLER COMPLETE: CPT | Performed by: INTERNAL MEDICINE

## 2022-02-20 RX ORDER — IPRATROPIUM BROMIDE AND ALBUTEROL SULFATE 2.5; .5 MG/3ML; MG/3ML
3 SOLUTION RESPIRATORY (INHALATION) EVERY 6 HOURS PRN
Status: DISCONTINUED | OUTPATIENT
Start: 2022-02-20 | End: 2022-02-21

## 2022-02-20 NOTE — PLAN OF CARE
Awake,was fed by daughter earlier,able to swallow food,no coughing noted while being fed. Responds well with daughter. V/S stable,TELE,HR up to 120's,LOPRESSOR GIVEN VIA IV. Repositioned as ordered for comfort. Daughter refused SCD'S,stated it make her Mom's FEET more swollen. Voiding via purewick,will monitor output. Turned as needed. Both lower extremites elevated   On a pillow. Will continue to monitor.

## 2022-02-20 NOTE — PLAN OF CARE
Patient remains lethargic, responding to daughter only by opening her eyes. She is Citizen of [place] speaking only. HR remains elevated in the 110's-120's , will have a 2d Echo . on RA, has a non-productive congested cough,  Generalized Edema noted, mostly to her left foot and perineal site. Skin blisters to perineal area. On Zosyn q8h. Due to void by noon, if retaining will place a anderson in. Had a large BM today. Daughter present at the bedside helping with patient's needs. Plan of care discussed with the daughter and verbalized understanding.

## 2022-02-21 VITALS
TEMPERATURE: 98 F | RESPIRATION RATE: 18 BRPM | HEART RATE: 89 BPM | BODY MASS INDEX: 20.93 KG/M2 | OXYGEN SATURATION: 100 % | SYSTOLIC BLOOD PRESSURE: 124 MMHG | HEIGHT: 57 IN | DIASTOLIC BLOOD PRESSURE: 46 MMHG | WEIGHT: 97 LBS

## 2022-02-21 LAB
ANION GAP SERPL CALC-SCNC: 3 MMOL/L (ref 0–18)
BASOPHILS # BLD AUTO: 0.07 X10(3) UL (ref 0–0.2)
BUN BLD-MCNC: 19 MG/DL (ref 7–18)
CALCIUM BLD-MCNC: 8.1 MG/DL (ref 8.5–10.1)
CHLORIDE SERPL-SCNC: 117 MMOL/L (ref 98–112)
CO2 SERPL-SCNC: 23 MMOL/L (ref 21–32)
CREAT BLD-MCNC: 0.85 MG/DL
EOSINOPHIL # BLD AUTO: 0.46 X10(3) UL (ref 0–0.7)
EOSINOPHIL NFR BLD AUTO: 8 %
ERYTHROCYTE [DISTWIDTH] IN BLOOD BY AUTOMATED COUNT: 15.9 %
GLUCOSE BLD-MCNC: 80 MG/DL (ref 70–99)
HCT VFR BLD AUTO: 25.3 %
HGB BLD-MCNC: 7.4 G/DL
IMM GRANULOCYTES # BLD AUTO: 0.04 X10(3) UL (ref 0–1)
IMM GRANULOCYTES NFR BLD: 0.7 %
LYMPHOCYTES # BLD AUTO: 1.37 X10(3) UL (ref 1–4)
LYMPHOCYTES NFR BLD AUTO: 23.7 %
MCH RBC QN AUTO: 28.1 PG (ref 26–34)
MCHC RBC AUTO-ENTMCNC: 29.2 G/DL (ref 31–37)
MCV RBC AUTO: 96.2 FL
MONOCYTES NFR BLD AUTO: 11.4 %
NEUTROPHILS # BLD AUTO: 3.17 X10 (3) UL (ref 1.5–7.7)
NEUTROPHILS # BLD AUTO: 3.17 X10(3) UL (ref 1.5–7.7)
NEUTROPHILS NFR BLD AUTO: 55 %
OSMOLALITY SERPL CALC.SUM OF ELEC: 297 MOSM/KG (ref 275–295)
PLATELET # BLD AUTO: 353 10(3)UL (ref 150–450)
POTASSIUM SERPL-SCNC: 4.1 MMOL/L (ref 3.5–5.1)
RBC # BLD AUTO: 2.63 X10(6)UL
SODIUM SERPL-SCNC: 143 MMOL/L (ref 136–145)
WBC # BLD AUTO: 5.8 X10(3) UL (ref 4–11)

## 2022-02-21 RX ORDER — AMOXICILLIN AND CLAVULANATE POTASSIUM 500; 125 MG/1; MG/1
1 TABLET, FILM COATED ORAL 2 TIMES DAILY
Qty: 14 TABLET | Refills: 0 | Status: SHIPPED | OUTPATIENT
Start: 2022-02-21 | End: 2022-02-28

## 2022-02-21 NOTE — PLAN OF CARE
Patient lethargic, arousal to pain stimulation and daughter's voice occassionally, [place] speaking, daughter to remain at bedside, medications crushed with applesauce, on pureed diet with Nectar thick liquids, non-productive cough on and off, Robitussin PRN ordered, on room air , HR stable at this time in 70's-80's NSR on tele, generalized edema, extremeties elevated on pillows, scds in place, voiding in brief, on bed rest, Q2 turns, mepilex to sacrum, on Zosyn, blood cxs pending, no further needs at this time, will continue to monitor.

## 2022-02-21 NOTE — CM/SW NOTE
Informed by pt's RN that pt can discharge to home today. Ambulance transport arranged for 3:45pm.  PCS form completed and available for RN to print. Updated pt's family at bedside. [facility] to resume [facility] and  services. No other DC needs at this time. / to remain available for support and/or discharge planning.      Mino Frazier LCSW  Discharge Planner  [phone]

## 2022-02-21 NOTE — PLAN OF CARE
Problem: RESPIRATORY - ADULT  Goal: Achieves optimal ventilation and oxygenation  Description: INTERVENTIONS:  - Assess for changes in respiratory status  - Assess for changes in mentation and behavior  - Position to facilitate oxygenation and minimize respiratory effort  - Oxygen supplementation based on oxygen saturation or ABGs  - Provide Smoking Cessation handout, if applicable  - Encourage broncho-pulmonary hygiene including cough, deep breathe, Incentive Spirometry  - Assess the need for suctioning and perform as needed  - Assess and instruct to report SOB or any respiratory difficulty  - Respiratory Therapy support as indicated  - Manage/alleviate anxiety  - Monitor for signs/symptoms of CO2 retention  Outcome: Progressing     Problem: Impaired Swallowing  Goal: Minimize aspiration risk  Description: Interventions:  - Patient should be alert and upright for all feedings (90 degrees preferred)  - Offer food and liquids at a slow rate  - No straws  - Encourage small bites of food and small sips of liquid  - Offer pills one at a time, crush or deliver with applesauce as needed  - Discontinue feeding and notify MD (or speech pathologist) if coughing or persistent throat clearing or wet/gurgly vocal quality is noted  Outcome: Progressing     Patient lethargica, able to open eyes when daughter called out her name in her language. Daughter at the bedside. Patient with generalized swelling, noted to have on and off non-productive cough. Daughter reported that patient seems to ne more responsive today, had good breakfast and able to take in more nectar thick liquids. Patient turned and repositioned every 2 hours, high risk for skin breakdown.

## 2022-02-21 NOTE — PROGRESS NOTES
NURSING DISCHARGE NOTE    Discharged Home via Ambulance. Accompanied by Family member  Belongings Taken by patient/family. Discharge instructions discussed with patient's daughter, she verbalized understanding.

## 2022-03-14 ENCOUNTER — LAB REQUISITION (OUTPATIENT)
Dept: LAB | Facility: HOSPITAL | Age: 87
End: 2022-03-14
Payer: MEDICARE

## 2022-03-14 LAB
BASOPHILS # BLD AUTO: 0.09 X10(3) UL (ref 0–0.2)
BASOPHILS NFR BLD AUTO: 1.2 %
EOSINOPHIL # BLD AUTO: 0.39 X10(3) UL (ref 0–0.7)
EOSINOPHIL NFR BLD AUTO: 5.3 %
ERYTHROCYTE [DISTWIDTH] IN BLOOD BY AUTOMATED COUNT: 15.8 %
HCT VFR BLD AUTO: 30.4 %
HGB BLD-MCNC: 9.4 G/DL
IMM GRANULOCYTES # BLD AUTO: 0.03 X10(3) UL (ref 0–1)
IMM GRANULOCYTES NFR BLD: 0.4 %
IRON SATN MFR SERPL: 6 %
IRON SERPL-MCNC: 26 UG/DL
LYMPHOCYTES # BLD AUTO: 1.86 X10(3) UL (ref 1–4)
LYMPHOCYTES NFR BLD AUTO: 25.5 %
MCH RBC QN AUTO: 27.6 PG (ref 26–34)
MCHC RBC AUTO-ENTMCNC: 30.9 G/DL (ref 31–37)
MCV RBC AUTO: 89.4 FL
MONOCYTES # BLD AUTO: 0.88 X10(3) UL (ref 0.1–1)
MONOCYTES NFR BLD AUTO: 12.1 %
NEUTROPHILS # BLD AUTO: 4.04 X10 (3) UL (ref 1.5–7.7)
NEUTROPHILS # BLD AUTO: 4.04 X10(3) UL (ref 1.5–7.7)
NEUTROPHILS NFR BLD AUTO: 55.5 %
PLATELET # BLD AUTO: 314 10(3)UL (ref 150–450)
RBC # BLD AUTO: 3.4 X10(6)UL
TIBC SERPL-MCNC: 423 UG/DL (ref 240–450)
TRANSFERRIN SERPL-MCNC: 284 MG/DL (ref 200–360)
WBC # BLD AUTO: 7.3 X10(3) UL (ref 4–11)

## 2022-03-14 PROCEDURE — 83540 ASSAY OF IRON: CPT | Performed by: INTERNAL MEDICINE

## 2022-03-14 PROCEDURE — 82728 ASSAY OF FERRITIN: CPT | Performed by: INTERNAL MEDICINE

## 2022-03-14 PROCEDURE — 83550 IRON BINDING TEST: CPT | Performed by: INTERNAL MEDICINE

## 2022-03-14 PROCEDURE — 85025 COMPLETE CBC W/AUTO DIFF WBC: CPT | Performed by: INTERNAL MEDICINE

## 2022-04-24 NOTE — DIETARY NOTE
[facility]   CLINICAL NUTRITION    Ihsan Haiderin     Admitting diagnosis:  Subarachnoid hemorrhage ([address] [I60.9]    Ht:  4'9\"  Wt: 48.6 kg (107 lb 2.3 oz). Body mass index is 23.19 kg/m².   IBW: 42kg    Labs/Meds reviewed    Diet: Orders Placed This Patient was brought to the ER by family due to right upper abdominal pain. Patient was previously diagnosed with gallstones while she was pregnant. However, she was to far along in her pregnancy for surgery. Patient was advised to follow up after delivery. Patient was ambulatory without assistance. Patient was triaged to room 3 and updated on the plan of care.

## 2022-05-11 ENCOUNTER — LAB REQUISITION (OUTPATIENT)
Dept: LAB | Facility: HOSPITAL | Age: 87
End: 2022-05-11
Payer: MEDICARE

## 2022-05-11 LAB
ALBUMIN SERPL-MCNC: 2.7 G/DL (ref 3.4–5)
ALBUMIN/GLOB SERPL: 0.7 {RATIO} (ref 1–2)
ALP LIVER SERPL-CCNC: 55 U/L
ALT SERPL-CCNC: 15 U/L
ANION GAP SERPL CALC-SCNC: 6 MMOL/L (ref 0–18)
AST SERPL-CCNC: 17 U/L (ref 15–37)
BASOPHILS # BLD AUTO: 0.05 X10(3) UL (ref 0–0.2)
BASOPHILS NFR BLD AUTO: 1 %
BILIRUB SERPL-MCNC: 0.2 MG/DL (ref 0.1–2)
BUN BLD-MCNC: 29 MG/DL (ref 7–18)
CALCIUM BLD-MCNC: 8.5 MG/DL (ref 8.5–10.1)
CHLORIDE SERPL-SCNC: 111 MMOL/L (ref 98–112)
CO2 SERPL-SCNC: 23 MMOL/L (ref 21–32)
CREAT BLD-MCNC: 1.6 MG/DL
EOSINOPHIL # BLD AUTO: 0.34 X10(3) UL (ref 0–0.7)
EOSINOPHIL NFR BLD AUTO: 6.8 %
ERYTHROCYTE [DISTWIDTH] IN BLOOD BY AUTOMATED COUNT: 17.9 %
GLOBULIN PLAS-MCNC: 3.7 G/DL (ref 2.8–4.4)
GLUCOSE BLD-MCNC: 103 MG/DL (ref 70–99)
HCT VFR BLD AUTO: 34.2 %
HGB BLD-MCNC: 10.4 G/DL
IMM GRANULOCYTES # BLD AUTO: 0.01 X10(3) UL (ref 0–1)
IMM GRANULOCYTES NFR BLD: 0.2 %
LYMPHOCYTES # BLD AUTO: 1.39 X10(3) UL (ref 1–4)
LYMPHOCYTES NFR BLD AUTO: 27.9 %
MCH RBC QN AUTO: 27.7 PG (ref 26–34)
MCHC RBC AUTO-ENTMCNC: 30.4 G/DL (ref 31–37)
MCV RBC AUTO: 91.2 FL
MONOCYTES # BLD AUTO: 0.5 X10(3) UL (ref 0.1–1)
MONOCYTES NFR BLD AUTO: 10 %
NEUTROPHILS # BLD AUTO: 2.69 X10 (3) UL (ref 1.5–7.7)
NEUTROPHILS # BLD AUTO: 2.69 X10(3) UL (ref 1.5–7.7)
NEUTROPHILS NFR BLD AUTO: 54.1 %
OSMOLALITY SERPL CALC.SUM OF ELEC: 296 MOSM/KG (ref 275–295)
PLATELET # BLD AUTO: 262 10(3)UL (ref 150–450)
POTASSIUM SERPL-SCNC: 4.2 MMOL/L (ref 3.5–5.1)
PROT SERPL-MCNC: 6.4 G/DL (ref 6.4–8.2)
RBC # BLD AUTO: 3.75 X10(6)UL
SODIUM SERPL-SCNC: 140 MMOL/L (ref 136–145)
WBC # BLD AUTO: 5 X10(3) UL (ref 4–11)

## 2022-05-11 PROCEDURE — 80053 COMPREHEN METABOLIC PANEL: CPT | Performed by: INTERNAL MEDICINE

## 2022-05-11 PROCEDURE — 85025 COMPLETE CBC W/AUTO DIFF WBC: CPT | Performed by: INTERNAL MEDICINE

## 2022-09-26 ENCOUNTER — APPOINTMENT (OUTPATIENT)
Dept: GENERAL RADIOLOGY | Facility: HOSPITAL | Age: 87
End: 2022-09-26
Attending: EMERGENCY MEDICINE
Payer: MEDICARE

## 2022-09-26 ENCOUNTER — HOSPITAL ENCOUNTER (INPATIENT)
Facility: HOSPITAL | Age: 87
LOS: 2 days | Discharge: HOME HEALTH | End: 2022-09-29
Attending: EMERGENCY MEDICINE | Admitting: INTERNAL MEDICINE
Payer: MEDICARE

## 2022-09-26 ENCOUNTER — HOSPITAL ENCOUNTER (INPATIENT)
Facility: HOSPITAL | Age: 87
LOS: 2 days | Discharge: HOME | End: 2022-09-29
Attending: EMERGENCY MEDICINE | Admitting: INTERNAL MEDICINE
Payer: MEDICARE

## 2022-09-26 DIAGNOSIS — T17.908A ASPIRATION INTO AIRWAY, INITIAL ENCOUNTER: Primary | ICD-10-CM

## 2022-09-26 DIAGNOSIS — R09.02 HYPOXIA: ICD-10-CM

## 2022-09-26 LAB
ALBUMIN SERPL-MCNC: 2.9 G/DL (ref 3.4–5)
ALBUMIN/GLOB SERPL: 0.6 {RATIO} (ref 1–2)
ALP LIVER SERPL-CCNC: 80 U/L
ALT SERPL-CCNC: 24 U/L
ANION GAP SERPL CALC-SCNC: 8 MMOL/L (ref 0–18)
AST SERPL-CCNC: 15 U/L (ref 15–37)
ATRIAL RATE: 123 BPM
BASOPHILS # BLD AUTO: 0.05 X10(3) UL (ref 0–0.2)
BASOPHILS NFR BLD AUTO: 0.3 %
BILIRUB SERPL-MCNC: 0.4 MG/DL (ref 0.1–2)
BUN BLD-MCNC: 43 MG/DL (ref 7–18)
CALCIUM BLD-MCNC: 10.2 MG/DL (ref 8.5–10.1)
CHLORIDE SERPL-SCNC: 111 MMOL/L (ref 98–112)
CO2 SERPL-SCNC: 21 MMOL/L (ref 21–32)
CREAT BLD-MCNC: 1.81 MG/DL
EOSINOPHIL # BLD AUTO: 0.01 X10(3) UL (ref 0–0.7)
EOSINOPHIL NFR BLD AUTO: 0.1 %
ERYTHROCYTE [DISTWIDTH] IN BLOOD BY AUTOMATED COUNT: 13.5 %
GFR SERPLBLD BASED ON 1.73 SQ M-ARVRAT: 26 ML/MIN/1.73M2 (ref 60–?)
GLOBULIN PLAS-MCNC: 4.9 G/DL (ref 2.8–4.4)
GLUCOSE BLD-MCNC: 204 MG/DL (ref 70–99)
HCT VFR BLD AUTO: 38.6 %
HGB BLD-MCNC: 12.3 G/DL
IMM GRANULOCYTES # BLD AUTO: 0.07 X10(3) UL (ref 0–1)
IMM GRANULOCYTES NFR BLD: 0.4 %
LACTATE SERPL-SCNC: 1.9 MMOL/L (ref 0.4–2)
LACTATE SERPL-SCNC: 4 MMOL/L (ref 0.4–2)
LYMPHOCYTES # BLD AUTO: 1.23 X10(3) UL (ref 1–4)
LYMPHOCYTES NFR BLD AUTO: 6.4 %
MCH RBC QN AUTO: 30.6 PG (ref 26–34)
MCHC RBC AUTO-ENTMCNC: 31.9 G/DL (ref 31–37)
MCV RBC AUTO: 96 FL
MONOCYTES # BLD AUTO: 0.86 X10(3) UL (ref 0.1–1)
MONOCYTES NFR BLD AUTO: 4.5 %
NEUTROPHILS # BLD AUTO: 16.98 X10 (3) UL (ref 1.5–7.7)
NEUTROPHILS # BLD AUTO: 16.98 X10(3) UL (ref 1.5–7.7)
NEUTROPHILS NFR BLD AUTO: 88.3 %
OSMOLALITY SERPL CALC.SUM OF ELEC: 307 MOSM/KG (ref 275–295)
P-R INTERVAL: 136 MS
PLATELET # BLD AUTO: 298 10(3)UL (ref 150–450)
POTASSIUM SERPL-SCNC: 4.4 MMOL/L (ref 3.5–5.1)
PROT SERPL-MCNC: 7.8 G/DL (ref 6.4–8.2)
Q-T INTERVAL: 380 MS
QRS DURATION: 108 MS
QTC CALCULATION (BEZET): 544 MS
R AXIS: 35 DEGREES
RBC # BLD AUTO: 4.02 X10(6)UL
SARS-COV-2 RNA RESP QL NAA+PROBE: NOT DETECTED
SODIUM SERPL-SCNC: 140 MMOL/L (ref 136–145)
T AXIS: 141 DEGREES
VENTRICULAR RATE: 123 BPM
WBC # BLD AUTO: 19.2 X10(3) UL (ref 4–11)

## 2022-09-26 PROCEDURE — 71045 X-RAY EXAM CHEST 1 VIEW: CPT | Performed by: EMERGENCY MEDICINE

## 2022-09-26 RX ORDER — BENZONATATE 100 MG/1
100 CAPSULE ORAL 2 TIMES DAILY PRN
COMMUNITY

## 2022-09-26 RX ORDER — METOCLOPRAMIDE HYDROCHLORIDE 5 MG/ML
5 INJECTION INTRAMUSCULAR; INTRAVENOUS EVERY 8 HOURS PRN
Status: DISCONTINUED | OUTPATIENT
Start: 2022-09-26 | End: 2022-09-29

## 2022-09-26 RX ORDER — ACETAMINOPHEN 500 MG
500 TABLET ORAL EVERY 4 HOURS PRN
Status: DISCONTINUED | OUTPATIENT
Start: 2022-09-26 | End: 2022-09-29

## 2022-09-26 RX ORDER — ENOXAPARIN SODIUM 100 MG/ML
40 INJECTION SUBCUTANEOUS DAILY
Status: DISCONTINUED | OUTPATIENT
Start: 2022-09-27 | End: 2022-09-26

## 2022-09-26 RX ORDER — SODIUM CHLORIDE 9 MG/ML
INJECTION, SOLUTION INTRAVENOUS CONTINUOUS
Status: DISCONTINUED | OUTPATIENT
Start: 2022-09-26 | End: 2022-09-27

## 2022-09-26 RX ORDER — IPRATROPIUM BROMIDE AND ALBUTEROL SULFATE 2.5; .5 MG/3ML; MG/3ML
3 SOLUTION RESPIRATORY (INHALATION) EVERY 4 HOURS PRN
Status: DISCONTINUED | OUTPATIENT
Start: 2022-09-26 | End: 2022-09-29

## 2022-09-26 RX ORDER — ONDANSETRON 2 MG/ML
4 INJECTION INTRAMUSCULAR; INTRAVENOUS EVERY 6 HOURS PRN
Status: DISCONTINUED | OUTPATIENT
Start: 2022-09-26 | End: 2022-09-29

## 2022-09-26 RX ORDER — HYDRALAZINE HYDROCHLORIDE 20 MG/ML
10 INJECTION INTRAMUSCULAR; INTRAVENOUS
Status: DISCONTINUED | OUTPATIENT
Start: 2022-09-26 | End: 2022-09-29

## 2022-09-26 RX ORDER — AMOXICILLIN 250 MG
1 CAPSULE ORAL 2 TIMES DAILY
COMMUNITY

## 2022-09-26 RX ORDER — HEPARIN SODIUM 5000 [USP'U]/ML
5000 INJECTION, SOLUTION INTRAVENOUS; SUBCUTANEOUS EVERY 8 HOURS SCHEDULED
Status: DISCONTINUED | OUTPATIENT
Start: 2022-09-26 | End: 2022-09-29

## 2022-09-26 RX ORDER — ECHINACEA PURPUREA EXTRACT 125 MG
1 TABLET ORAL
Status: DISCONTINUED | OUTPATIENT
Start: 2022-09-26 | End: 2022-09-29

## 2022-09-26 NOTE — ED INITIAL ASSESSMENT (HPI)
Pt started feeling bad, labored breathing about an hour ago. Low O2 at home per 88%. Temp at home 99.6. Tachycardic.

## 2022-09-26 NOTE — ED QUICK NOTES
Orders for admission, patient is aware of plan and ready to go upstairs. Any questions, please call ED RN Jose Elias Beckham at extension [phone].      Patient Covid vaccination status: Fully vaccinated     COVID Test Ordered in ED: Rapid SARS-CoV-2 by PCR    COVID Suspicion at Admission: Low clinical suspicion for COVID    Running Infusions:  None    Mental Status/LOC at time of transport: Nonverbal     Other pertinent information:   CIWA score: N/A   NIH score:  N/A

## 2022-09-27 LAB
ADENOVIRUS PCR:: NOT DETECTED
ANION GAP SERPL CALC-SCNC: 4 MMOL/L (ref 0–18)
B PARAPERT DNA SPEC QL NAA+PROBE: NOT DETECTED
B PERT DNA SPEC QL NAA+PROBE: NOT DETECTED
BASOPHILS # BLD AUTO: 0.03 X10(3) UL (ref 0–0.2)
BASOPHILS NFR BLD AUTO: 0.3 %
BUN BLD-MCNC: 38 MG/DL (ref 7–18)
C PNEUM DNA SPEC QL NAA+PROBE: NOT DETECTED
CALCIUM BLD-MCNC: 9.1 MG/DL (ref 8.5–10.1)
CHLORIDE SERPL-SCNC: 117 MMOL/L (ref 98–112)
CO2 SERPL-SCNC: 23 MMOL/L (ref 21–32)
CORONAVIRUS 229E PCR:: NOT DETECTED
CORONAVIRUS HKU1 PCR:: NOT DETECTED
CORONAVIRUS NL63 PCR:: NOT DETECTED
CORONAVIRUS OC43 PCR:: NOT DETECTED
CREAT BLD-MCNC: 1.18 MG/DL
EOSINOPHIL # BLD AUTO: 0.01 X10(3) UL (ref 0–0.7)
EOSINOPHIL NFR BLD AUTO: 0.1 %
ERYTHROCYTE [DISTWIDTH] IN BLOOD BY AUTOMATED COUNT: 13.7 %
FLUAV RNA SPEC QL NAA+PROBE: NOT DETECTED
FLUBV RNA SPEC QL NAA+PROBE: NOT DETECTED
GFR SERPLBLD BASED ON 1.73 SQ M-ARVRAT: 43 ML/MIN/1.73M2 (ref 60–?)
GLUCOSE BLD-MCNC: 155 MG/DL (ref 70–99)
HCT VFR BLD AUTO: 33.7 %
HGB BLD-MCNC: 10.7 G/DL
IMM GRANULOCYTES # BLD AUTO: 0.03 X10(3) UL (ref 0–1)
IMM GRANULOCYTES NFR BLD: 0.3 %
LYMPHOCYTES # BLD AUTO: 1.04 X10(3) UL (ref 1–4)
LYMPHOCYTES NFR BLD AUTO: 9.6 %
MAGNESIUM SERPL-MCNC: 2.6 MG/DL (ref 1.6–2.6)
MCH RBC QN AUTO: 30.7 PG (ref 26–34)
MCHC RBC AUTO-ENTMCNC: 31.8 G/DL (ref 31–37)
MCV RBC AUTO: 96.8 FL
METAPNEUMOVIRUS PCR:: NOT DETECTED
MONOCYTES # BLD AUTO: 0.6 X10(3) UL (ref 0.1–1)
MONOCYTES NFR BLD AUTO: 5.5 %
MYCOPLASMA PNEUMONIA PCR:: NOT DETECTED
NEUTROPHILS # BLD AUTO: 9.13 X10 (3) UL (ref 1.5–7.7)
NEUTROPHILS # BLD AUTO: 9.13 X10(3) UL (ref 1.5–7.7)
NEUTROPHILS NFR BLD AUTO: 84.2 %
OSMOLALITY SERPL CALC.SUM OF ELEC: 310 MOSM/KG (ref 275–295)
PARAINFLUENZA 1 PCR:: NOT DETECTED
PARAINFLUENZA 2 PCR:: NOT DETECTED
PARAINFLUENZA 3 PCR:: NOT DETECTED
PARAINFLUENZA 4 PCR:: NOT DETECTED
PLATELET # BLD AUTO: 244 10(3)UL (ref 150–450)
POTASSIUM SERPL-SCNC: 4.1 MMOL/L (ref 3.5–5.1)
RBC # BLD AUTO: 3.48 X10(6)UL
RHINOVIRUS/ENTERO PCR:: NOT DETECTED
RSV RNA SPEC QL NAA+PROBE: NOT DETECTED
SARS-COV-2 RNA NPH QL NAA+NON-PROBE: NOT DETECTED
SODIUM SERPL-SCNC: 144 MMOL/L (ref 136–145)
WBC # BLD AUTO: 10.8 X10(3) UL (ref 4–11)

## 2022-09-27 RX ORDER — DEXTROSE, SODIUM CHLORIDE, SODIUM LACTATE, POTASSIUM CHLORIDE, AND CALCIUM CHLORIDE 5; .6; .31; .03; .02 G/100ML; G/100ML; G/100ML; G/100ML; G/100ML
INJECTION, SOLUTION INTRAVENOUS CONTINUOUS
Status: DISCONTINUED | OUTPATIENT
Start: 2022-09-27 | End: 2022-09-29

## 2022-09-27 NOTE — PROGRESS NOTES
Received patient lethargic, unable to assess orientation. Responsive to painful stimuli. Q2h turns, Pt is bed bound and dino lift assist at home. NPO per speech eval.  Heparin SubQ. NSR on telemetry and O2 > 95% on 2L, RA at baseline. Incontinent x2 and briefed. IV abx for pna. IVF changed to D5LR. CXR ordered for tomorrow. RVP pending. Family at bedside and updated on POC.

## 2022-09-27 NOTE — PLAN OF CARE
Assumed care of patient @0000. Daughter called and c/o patient having a cough. Unable to give oral medicine at this time for strict NPO till seen by speech therapy. RT gave a breathing treatment that helped.

## 2022-09-27 NOTE — PROGRESS NOTES
09/27/22 1237   Clinical Encounter Type   Visited With Health care provider;Patient not available   Routine Visit   (Responded to page)   Taoist Encounters   Taoist Needs Prayer  ( Jan offered Mormon prayer support to the daughter Zenon Conner)   Sacramental Encounters   Sacrament of Sick-Anointing Family requested anointing   Per request, notified Flakita Steve will be here to anoint the patient around 4pm. Notified the daughter and the nurse regarding the same.

## 2022-09-27 NOTE — CM/SW NOTE
Department  notified of request for Mahesh  and Palliative , aidin referrals started. Assigned CM/SW to follow up with pt/family on further discharge planning.      Giovanna Gilbert  [address] [place] [address]

## 2022-09-27 NOTE — PLAN OF CARE
NURSING ADMISSION NOTE      Patient admitted via Cart  Oriented to room 530. Safety precautions initiated. Bed in low position. Call light in reach. Admission navigator and med rec complete. Pt lethargic, unable to assess orientation. Dtrs at bedside helping translate. VSS on 2L. [place] suction PRN. Tele NSR/ST. Heparin. Incontinent x2, briefed. PT had soft BM this shift. IVF, IV abx. Bedbound at baseline. PIV infusing, c/d/I. NPO until SLP eval in AM. Pt and family updated on POC. Call light in reach, all needs met at this time. Problem: RESPIRATORY - ADULT  Goal: Achieves optimal ventilation and oxygenation  Description: INTERVENTIONS:  - Assess for changes in respiratory status  - Assess for changes in mentation and behavior  - Position to facilitate oxygenation and minimize respiratory effort  - Oxygen supplementation based on oxygen saturation or ABGs  - Provide Smoking Cessation handout, if applicable  - Encourage broncho-pulmonary hygiene including cough, deep breathe, Incentive Spirometry  - Assess the need for suctioning and perform as needed  - Assess and instruct to report SOB or any respiratory difficulty  - Respiratory Therapy support as indicated  - Manage/alleviate anxiety  - Monitor for signs/symptoms of CO2 retention  Outcome: Progressing     Problem: SAFETY ADULT - FALL  Goal: Free from fall injury  Description: INTERVENTIONS:  - Assess pt frequently for physical needs  - Identify cognitive and physical deficits and behaviors that affect risk of falls.   - [place] fall precautions as indicated by assessment.  - Educate pt/family on patient safety including physical limitations  - Instruct pt to call for assistance with activity based on assessment  - Modify environment to reduce risk of injury  - Provide assistive devices as appropriate  - Consider OT/PT consult to assist with strengthening/mobility  - Encourage toileting schedule  Outcome: Progressing     Problem: MUSCULOSKELETAL - ADULT  Goal: Return mobility to safest level of function  Description: INTERVENTIONS:  - Assess patient stability and activity tolerance for standing, transferring and ambulating w/ or w/o assistive devices  - Assist with transfers and ambulation using safe patient handling equipment as needed  - Ensure adequate protection for wounds/incisions during mobilization  - Obtain PT/OT consults as needed  - Advance activity as appropriate  - Communicate ordered activity level and limitations with patient/family  Outcome: Progressing  Goal: Maintain proper alignment of affected body part  Description: INTERVENTIONS:  - Support and protect limb and body alignment per provider's orders  - Instruct and reinforce with patient and family use of appropriate assistive device and precautions (e.g. spinal or hip dislocation precautions)  Outcome: Progressing     Problem: GASTROINTESTINAL - ADULT  Goal: Minimal or absence of nausea and vomiting  Description: INTERVENTIONS:  - Maintain adequate hydration with IV or PO as ordered and tolerated  - Nasogastric tube to low intermittent suction as ordered  - Evaluate effectiveness of ordered antiemetic medications  - Provide nonpharmacologic comfort measures as appropriate  - Advance diet as tolerated, if ordered  - Obtain nutritional consult as needed  - Evaluate fluid balance  Outcome: Progressing  Goal: Maintains or returns to baseline bowel function  Description: INTERVENTIONS:  - Assess bowel function  - Maintain adequate hydration with IV or PO as ordered and tolerated  - Evaluate effectiveness of GI medications  - Encourage mobilization and activity  - Obtain nutritional consult as needed  - Establish a toileting routine/schedule  - Consider collaborating with pharmacy to review patient's medication profile  Outcome: Progressing  Goal: Maintains adequate nutritional intake (undernourished)  Description: INTERVENTIONS:  - Monitor percentage of each meal consumed  - Identify factors contributing to decreased intake, treat as appropriate  - Assist with meals as needed  - Monitor I&O, WT and lab values  - Obtain nutritional consult as needed  - Optimize oral hygiene and moisture  - Encourage food from home; allow for food preferences  - Enhance eating environment  Outcome: Progressing  Goal: Achieves appropriate nutritional intake (bariatric)  Description: INTERVENTIONS:  - Monitor for over-consumption  - Identify factors contributing to increased intake, treat as appropriate  - Monitor I&O, WT and lab values  - Obtain nutritional consult as needed  - Evaluate psychosocial factors contributing to over-consumption  Outcome: Progressing

## 2022-09-27 NOTE — CONSULTS
[facility] Pharmacy Note:  Renal Adjustment for piperacillin/tazobactam (Keith Gonzalez)    Robert Murphy is a 80year old patient who has been prescribed piperacillin/tazobactam (ZOSYN) 3.375 g every 8 hrs. The estimated creatinine clearance is 13.6 mL/min (A) (based on SCr of 1.81 mg/dL (H)). The dose has been adjusted to piperacillin/tazobactam (ZOSYN) 3.375 g every 12 hrs per hospital renal dose adjustment protocol for treatment of pneumonia. Pharmacy will follow and adjust dose as warranted for additional renal function changes.     Thank you,    Nicole Choi, PharmD  9/26/2022  8:54 PM

## 2022-09-27 NOTE — CM/SW NOTE
Pt is an 79 yo female admitted for aspiration into airway. Pt lives with her dtr. Pt has a history with [facility]  and [facility]; however, when speaking with pt's dtr she said she does not want to use [facility] anymore and would prefer to use another agency. HHO/F2F written. Referrals to be sent by [facility] for both [facility] & [facility] in [place] - waiting for responses. SW following.       09/27/22 1600   CM/SW Referral Data   Referral Source Physician   Reason for Referral Discharge planning   Informant Daughter   Patient [address]   Patient lives with Daughter   Patient Status Prior to Admission   Services in place prior to admission [address]; Other (comment)   Home Health Provider Info   ([facility]   Discharge Needs   Anticipated D/C needs Home health care;Palliative care   Choice of Post-Acute Provider   Informed patient of right to choose their preferred provider Yes

## 2022-09-27 NOTE — PLAN OF CARE
Problem: RESPIRATORY - ADULT  Goal: Achieves optimal ventilation and oxygenation  Description: INTERVENTIONS:  - Assess for changes in respiratory status  - Assess for changes in mentation and behavior  - Position to facilitate oxygenation and minimize respiratory effort  - Oxygen supplementation based on oxygen saturation or ABGs  - Provide Smoking Cessation handout, if applicable  - Encourage broncho-pulmonary hygiene including cough, deep breathe, Incentive Spirometry  - Assess the need for suctioning and perform as needed  - Assess and instruct to report SOB or any respiratory difficulty  - Respiratory Therapy support as indicated  - Manage/alleviate anxiety  - Monitor for signs/symptoms of CO2 retention  Outcome: Progressing     Problem: SAFETY ADULT - FALL  Goal: Free from fall injury  Description: INTERVENTIONS:  - Assess pt frequently for physical needs  - Identify cognitive and physical deficits and behaviors that affect risk of falls.   - [place] fall precautions as indicated by assessment.  - Educate pt/family on patient safety including physical limitations  - Instruct pt to call for assistance with activity based on assessment  - Modify environment to reduce risk of injury  - Provide assistive devices as appropriate  - Consider OT/PT consult to assist with strengthening/mobility  - Encourage toileting schedule  Outcome: Progressing     Problem: MUSCULOSKELETAL - ADULT  Goal: Return mobility to safest level of function  Description: INTERVENTIONS:  - Assess patient stability and activity tolerance for standing, transferring and ambulating w/ or w/o assistive devices  - Assist with transfers and ambulation using safe patient handling equipment as needed  - Ensure adequate protection for wounds/incisions during mobilization  - Obtain PT/OT consults as needed  - Advance activity as appropriate  - Communicate ordered activity level and limitations with patient/family  Outcome: Progressing  Goal: Maintain proper alignment of affected body part  Description: INTERVENTIONS:  - Support and protect limb and body alignment per provider's orders  - Instruct and reinforce with patient and family use of appropriate assistive device and precautions (e.g. spinal or hip dislocation precautions)  Outcome: Progressing     Problem: GASTROINTESTINAL - ADULT  Goal: Minimal or absence of nausea and vomiting  Description: INTERVENTIONS:  - Maintain adequate hydration with IV or PO as ordered and tolerated  - Nasogastric tube to low intermittent suction as ordered  - Evaluate effectiveness of ordered antiemetic medications  - Provide nonpharmacologic comfort measures as appropriate  - Advance diet as tolerated, if ordered  - Obtain nutritional consult as needed  - Evaluate fluid balance  Outcome: Progressing  Goal: Maintains or returns to baseline bowel function  Description: INTERVENTIONS:  - Assess bowel function  - Maintain adequate hydration with IV or PO as ordered and tolerated  - Evaluate effectiveness of GI medications  - Encourage mobilization and activity  - Obtain nutritional consult as needed  - Establish a toileting routine/schedule  - Consider collaborating with pharmacy to review patient's medication profile  Outcome: Progressing  Goal: Maintains adequate nutritional intake (undernourished)  Description: INTERVENTIONS:  - Monitor percentage of each meal consumed  - Identify factors contributing to decreased intake, treat as appropriate  - Assist with meals as needed  - Monitor I&O, WT and lab values  - Obtain nutritional consult as needed  - Optimize oral hygiene and moisture  - Encourage food from home; allow for food preferences  - Enhance eating environment  Outcome: Progressing  Goal: Achieves appropriate nutritional intake (bariatric)  Description: INTERVENTIONS:  - Monitor for over-consumption  - Identify factors contributing to increased intake, treat as appropriate  - Monitor I&O, WT and lab values  - Obtain nutritional consult as needed  - Evaluate psychosocial factors contributing to over-consumption  Outcome: Progressing

## 2022-09-27 NOTE — PROGRESS NOTES
09/26/22 2113   Provider Notification   Reason for Communication Review case  (sepsis BPA)   Provider Name Other (comment)  (Dr Luis Sams)   Method of Communication Page   Response Waiting for response   Notification Time 2114   Rm 530- pt admission navigator complete. Pt here for aspiration pna. Sepsis BPA firing- pt vitals: HR 95, /95, temp 99, RR 25, SpO2 98 on 3L. Last lactic 1.9. Blood cx pending. Pt received 500 cc bolus in ED (they were worried ab fluid overloading), pt is getting IVF 50cc/hr, also receiving IV zosyn. Any new orders?

## 2022-09-27 NOTE — OCCUPATIONAL THERAPY NOTE
OT order received under mobility screen and chart reviewed. Pt is bed bound and dino lift assist at home and is dependent in all aspects of her functional mobilities. Pt is at her baseline and presents with no skilled IP OT needs at this time. OT will sign off at this time.

## 2022-09-27 NOTE — SPIRITUAL CARE NOTE
followed up with patient's daughter about the [address]. Daughter ([address]) said the  had come and done an Anointing. She appreciate the visit and knows chaplains are available and can be contacted through RN. For further spiritual care, please enter a consult in Epic, followed by contacting pager [phone] as needed. EARL Rae Cull

## 2022-09-27 NOTE — PHYSICAL THERAPY NOTE
PT order received under mobility screen and chart reviewed. Pt is bed bound and dino lift assist at home and is dependent in all aspect of her functional mobilities. Pt is at her baseline and does not need further skilled PT intervention. Nursing can cont to mobilized pt while in hosp to avoid further debilitating effects of immobility.  D/c as plan

## 2022-09-27 NOTE — CONSULTS
[facility] Pharmacy Note:  Renal Dose Adjustment for enoxaparin (LOVENOX)    Robert Ellis has been prescribed enoxaparin 40 mg subcutaneously every 24 hours. Estimated Creatinine Clearance: 13.6 mL/min (A) (based on SCr of 1.81 mg/dL (H)). Calculated CrCl less than 20 mL/min so the dose of Enoxaparin (LOVENOX) has been changed to heparin 5000 units every 8 hours per P&T approved protocol. Pharmacy will continue to follow, and make additional adjustments if needed.       Thank you,  Maurizio Wilson, PharmD  9/26/2022 9:00 PM

## 2022-09-28 ENCOUNTER — APPOINTMENT (OUTPATIENT)
Dept: GENERAL RADIOLOGY | Facility: HOSPITAL | Age: 87
End: 2022-09-28
Attending: HOSPITALIST
Payer: MEDICARE

## 2022-09-28 ENCOUNTER — APPOINTMENT (OUTPATIENT)
Dept: GENERAL RADIOLOGY | Facility: HOSPITAL | Age: 87
End: 2022-09-28
Attending: INTERNAL MEDICINE
Payer: MEDICARE

## 2022-09-28 LAB — PROCALCITONIN SERPL-MCNC: 0.25 NG/ML (ref ?–0.16)

## 2022-09-28 PROCEDURE — 74230 X-RAY XM SWLNG FUNCJ C+: CPT | Performed by: HOSPITALIST

## 2022-09-28 PROCEDURE — 71045 X-RAY EXAM CHEST 1 VIEW: CPT | Performed by: INTERNAL MEDICINE

## 2022-09-28 RX ORDER — FAMOTIDINE 10 MG/ML
20 INJECTION, SOLUTION INTRAVENOUS DAILY
Status: DISCONTINUED | OUTPATIENT
Start: 2022-09-28 | End: 2022-09-29

## 2022-09-28 NOTE — CM/SW NOTE
Only one [facility] agency accepted pt - [facility]. Gave dtr the information for [facility]. She will decide if rshe wants to use [facility] or go back with [facility]. SW to f/u for dtr's [facility] agency choice.

## 2022-09-28 NOTE — SLP NOTE
SPEECH DAILY NOTE - INPATIENT    ASSESSMENT & PLAN   ASSESSMENT  Patient seen to assess for improvement in swallow function. Patient daughter at bedside and present throughout this visit. Patient alert to voice and able to sustain alertness much better today. Patient tolerated oral care well though she does have a very sensitive gag reflex that is triggered at the mid/posterior tongue. Patient able to accept puree and mildly thick liquids by tsp. Patient without any anterior loss of bolus. Oral prep and transit were prolonged, suspect tongue pumping. Questionable timieliness of laryngeal excursion given prolonged oral transit and suspicion of po in pharynx prior to laryngeal excursion palpation by SLP. Laryngeal excursion strength appeared mildly reduced but adequate. There were no overt signs of aspiration with puree but immediate cough noted with mildly thick liquid by tsp. Patient much more alert today and better able to participate in po trials. She is showing potential signs of aspiration at bedside. Given concern for aspiration after hospitalization in January leading to recommendation for mildly thick liquids and concern for aspiration prompting respiratory issues leading to this admission, recommend VFSS to objectively assess oropharyngeal swallow function. Discussed with daughter at bedside who was in agreement.      Diet Recommendations - Solids: NPO  Diet Recommendations - Liquids: NPO          Medication Administration Recommendations: Non-oral    Patient Experiencing Pain: No                Discharge Recommendations  Discharge Recommendations/Plan: Undetermined    Treatment Plan  Treatment Plan/Recommendations: Videofluoroscopic swallow study    Interdisciplinary Communication: Discussed with RN            GOALS  Goal #1 Patient will participate in reassessment of swallow function  Met   Goal #2 Patient will participate in VFSS    In Progress     FOLLOW UP  Follow Up Needed (Documentation Required): Yes  SLP Follow-up Date: 09/28/22  Number of Visits to Meet Established Goals: 3    Session: 1 of 3    If you have any questions, please contact [address]  Pager [phone]

## 2022-09-28 NOTE — PHYSICAL THERAPY NOTE
Duplicate PT order placed this date. Per notes on 9/27, pt presents from home, dependent with use of dino lift. No skilled intervention required while in house.

## 2022-09-28 NOTE — PLAN OF CARE
Patient lethargic but responsive to painful stimuli, per report this is baseline. Family at bedside, and daughter staying overnight. Patient is resting in bed, appears comfortable, no distress. Patient resting on 2L O2 NC, remains on tele and  monitoring. Patient incontinent x2, patient in brief. Pt is bed bound, q2h turns. NPO. IV abx. IVF. Soft formed BM overnight, C.diff discontinued per protocol. Fall precautions in place. Calllight in reach. Aspiration precautions in place. Problem: RESPIRATORY - ADULT  Goal: Achieves optimal ventilation and oxygenation  Description: INTERVENTIONS:  - Assess for changes in respiratory status  - Assess for changes in mentation and behavior  - Position to facilitate oxygenation and minimize respiratory effort  - Oxygen supplementation based on oxygen saturation or ABGs  - Provide Smoking Cessation handout, if applicable  - Encourage broncho-pulmonary hygiene including cough, deep breathe, Incentive Spirometry  - Assess the need for suctioning and perform as needed  - Assess and instruct to report SOB or any respiratory difficulty  - Respiratory Therapy support as indicated  - Manage/alleviate anxiety  - Monitor for signs/symptoms of CO2 retention  Outcome: Progressing     Problem: SAFETY ADULT - FALL  Goal: Free from fall injury  Description: INTERVENTIONS:  - Assess pt frequently for physical needs  - Identify cognitive and physical deficits and behaviors that affect risk of falls.   - [place] fall precautions as indicated by assessment.  - Educate pt/family on patient safety including physical limitations  - Instruct pt to call for assistance with activity based on assessment  - Modify environment to reduce risk of injury  - Provide assistive devices as appropriate  - Consider OT/PT consult to assist with strengthening/mobility  - Encourage toileting schedule  Outcome: Progressing     Problem: MUSCULOSKELETAL - ADULT  Goal: Return mobility to safest level of function  Description: INTERVENTIONS:  - Assess patient stability and activity tolerance for standing, transferring and ambulating w/ or w/o assistive devices  - Assist with transfers and ambulation using safe patient handling equipment as needed  - Ensure adequate protection for wounds/incisions during mobilization  - Obtain PT/OT consults as needed  - Advance activity as appropriate  - Communicate ordered activity level and limitations with patient/family  Outcome: Progressing  Goal: Maintain proper alignment of affected body part  Description: INTERVENTIONS:  - Support and protect limb and body alignment per provider's orders  - Instruct and reinforce with patient and family use of appropriate assistive device and precautions (e.g. spinal or hip dislocation precautions)  Outcome: Progressing     Problem: GASTROINTESTINAL - ADULT  Goal: Minimal or absence of nausea and vomiting  Description: INTERVENTIONS:  - Maintain adequate hydration with IV or PO as ordered and tolerated  - Nasogastric tube to low intermittent suction as ordered  - Evaluate effectiveness of ordered antiemetic medications  - Provide nonpharmacologic comfort measures as appropriate  - Advance diet as tolerated, if ordered  - Obtain nutritional consult as needed  - Evaluate fluid balance  Outcome: Progressing  Goal: Maintains or returns to baseline bowel function  Description: INTERVENTIONS:  - Assess bowel function  - Maintain adequate hydration with IV or PO as ordered and tolerated  - Evaluate effectiveness of GI medications  - Encourage mobilization and activity  - Obtain nutritional consult as needed  - Establish a toileting routine/schedule  - Consider collaborating with pharmacy to review patient's medication profile  Outcome: Progressing  Goal: Maintains adequate nutritional intake (undernourished)  Description: INTERVENTIONS:  - Monitor percentage of each meal consumed  - Identify factors contributing to decreased intake, treat as appropriate  - Assist with meals as needed  - Monitor I&O, WT and lab values  - Obtain nutritional consult as needed  - Optimize oral hygiene and moisture  - Encourage food from home; allow for food preferences  - Enhance eating environment  Outcome: Progressing  Goal: Achieves appropriate nutritional intake (bariatric)  Description: INTERVENTIONS:  - Monitor for over-consumption  - Identify factors contributing to increased intake, treat as appropriate  - Monitor I&O, WT and lab values  - Obtain nutritional consult as needed  - Evaluate psychosocial factors contributing to over-consumption  Outcome: Progressing     Problem: Patient/Family Goals  Goal: Patient/Family Long Term Goal  Description: Patient's Long Term Goal: Discharge with adequate resources    Interventions:  - SLP eval  -Oral suctioning   -IV abx  - See additional Care Plan goals for specific interventions  Outcome: Progressing  Goal: Patient/Family Short Term Goal  Description: Patient's Short Term Goal:   9/27 am: Speech eval  9/27 noc: rest, sleep, airway clearance  Interventions:   - NPO  -oral suctioning  -oral care  - See additional Care Plan goals for specific interventions  Outcome: Progressing

## 2022-09-28 NOTE — PLAN OF CARE
Pt is arousable but no orientation. Baseline RA, but currently on 2-3L for comfort, oral suction at bed side. Tele, SR. IV zosyn. Heparin for dvt prophylactic dvt. Total lift. q2hr turn. D5/LR 50mL/hr. Palliative was consulted during shift. Pt's dtr was agreeable to talk to palliative this morning but asked writer to cancel consult this afternoon. Will pass along to noc shift nurse. Pt had video eval during, please see SLP notes. Pt is currently on comfort feeding, pureed/thick liquid. Writer expressed to dtr the risk of aspiration. Dtr said family will feed pt during meal times. Pt's family updated on poc. Call light in reach. Safety precautions in place. All needs are met at this time. Problem: RESPIRATORY - ADULT  Goal: Achieves optimal ventilation and oxygenation  Description: INTERVENTIONS:  - Assess for changes in respiratory status  - Assess for changes in mentation and behavior  - Position to facilitate oxygenation and minimize respiratory effort  - Oxygen supplementation based on oxygen saturation or ABGs  - Provide Smoking Cessation handout, if applicable  - Encourage broncho-pulmonary hygiene including cough, deep breathe, Incentive Spirometry  - Assess the need for suctioning and perform as needed  - Assess and instruct to report SOB or any respiratory difficulty  - Respiratory Therapy support as indicated  - Manage/alleviate anxiety  - Monitor for signs/symptoms of CO2 retention  Outcome: Progressing     Problem: SAFETY ADULT - FALL  Goal: Free from fall injury  Description: INTERVENTIONS:  - Assess pt frequently for physical needs  - Identify cognitive and physical deficits and behaviors that affect risk of falls.   - [place] fall precautions as indicated by assessment.  - Educate pt/family on patient safety including physical limitations  - Instruct pt to call for assistance with activity based on assessment  - Modify environment to reduce risk of injury  - Provide assistive devices as appropriate  - Consider OT/PT consult to assist with strengthening/mobility  - Encourage toileting schedule  Outcome: Progressing     Problem: MUSCULOSKELETAL - ADULT  Goal: Return mobility to safest level of function  Description: INTERVENTIONS:  - Assess patient stability and activity tolerance for standing, transferring and ambulating w/ or w/o assistive devices  - Assist with transfers and ambulation using safe patient handling equipment as needed  - Ensure adequate protection for wounds/incisions during mobilization  - Obtain PT/OT consults as needed  - Advance activity as appropriate  - Communicate ordered activity level and limitations with patient/family  Outcome: Progressing  Goal: Maintain proper alignment of affected body part  Description: INTERVENTIONS:  - Support and protect limb and body alignment per provider's orders  - Instruct and reinforce with patient and family use of appropriate assistive device and precautions (e.g. spinal or hip dislocation precautions)  Outcome: Progressing     Problem: GASTROINTESTINAL - ADULT  Goal: Minimal or absence of nausea and vomiting  Description: INTERVENTIONS:  - Maintain adequate hydration with IV or PO as ordered and tolerated  - Nasogastric tube to low intermittent suction as ordered  - Evaluate effectiveness of ordered antiemetic medications  - Provide nonpharmacologic comfort measures as appropriate  - Advance diet as tolerated, if ordered  - Obtain nutritional consult as needed  - Evaluate fluid balance  Outcome: Progressing  Goal: Maintains or returns to baseline bowel function  Description: INTERVENTIONS:  - Assess bowel function  - Maintain adequate hydration with IV or PO as ordered and tolerated  - Evaluate effectiveness of GI medications  - Encourage mobilization and activity  - Obtain nutritional consult as needed  - Establish a toileting routine/schedule  - Consider collaborating with pharmacy to review patient's medication profile  Outcome: Progressing  Goal: Maintains adequate nutritional intake (undernourished)  Description: INTERVENTIONS:  - Monitor percentage of each meal consumed  - Identify factors contributing to decreased intake, treat as appropriate  - Assist with meals as needed  - Monitor I&O, WT and lab values  - Obtain nutritional consult as needed  - Optimize oral hygiene and moisture  - Encourage food from home; allow for food preferences  - Enhance eating environment  Outcome: Progressing  Goal: Achieves appropriate nutritional intake (bariatric)  Description: INTERVENTIONS:  - Monitor for over-consumption  - Identify factors contributing to increased intake, treat as appropriate  - Monitor I&O, WT and lab values  - Obtain nutritional consult as needed  - Evaluate psychosocial factors contributing to over-consumption  Outcome: Progressing     Problem: Patient/Family Goals  Goal: Patient/Family Long Term Goal  Description: Patient's Long Term Goal: Discharge with adequate resources    Interventions:  - SLP eval  -Oral suctioning   -IV abx  - See additional Care Plan goals for specific interventions  Outcome: Progressing  Goal: Patient/Family Short Term Goal  Description: Patient's Short Term Goal:   9/27 am: Speech eval  9/27 noc: rest, sleep, airway clearance  9/28: rest, comfort    Interventions:   - NPO  -oral suctioning  -oral care  - See additional Care Plan goals for specific interventions  Outcome: Progressing

## 2022-09-29 VITALS
HEIGHT: 60 IN | BODY MASS INDEX: 19.63 KG/M2 | DIASTOLIC BLOOD PRESSURE: 47 MMHG | TEMPERATURE: 99 F | OXYGEN SATURATION: 95 % | HEART RATE: 98 BPM | RESPIRATION RATE: 16 BRPM | SYSTOLIC BLOOD PRESSURE: 112 MMHG | WEIGHT: 100 LBS

## 2022-09-29 LAB
ANION GAP SERPL CALC-SCNC: 5 MMOL/L (ref 0–18)
BASOPHILS # BLD AUTO: 0.08 X10(3) UL (ref 0–0.2)
BASOPHILS NFR BLD AUTO: 1.2 %
BUN BLD-MCNC: 33 MG/DL (ref 7–18)
CALCIUM BLD-MCNC: 9 MG/DL (ref 8.5–10.1)
CHLORIDE SERPL-SCNC: 119 MMOL/L (ref 98–112)
CO2 SERPL-SCNC: 24 MMOL/L (ref 21–32)
CREAT BLD-MCNC: 0.99 MG/DL
EOSINOPHIL # BLD AUTO: 0.16 X10(3) UL (ref 0–0.7)
EOSINOPHIL NFR BLD AUTO: 2.4 %
ERYTHROCYTE [DISTWIDTH] IN BLOOD BY AUTOMATED COUNT: 13.6 %
GFR SERPLBLD BASED ON 1.73 SQ M-ARVRAT: 53 ML/MIN/1.73M2 (ref 60–?)
GLUCOSE BLD-MCNC: 114 MG/DL (ref 70–99)
HCT VFR BLD AUTO: 31.1 %
HGB BLD-MCNC: 9.3 G/DL
IMM GRANULOCYTES # BLD AUTO: 0.28 X10(3) UL (ref 0–1)
IMM GRANULOCYTES NFR BLD: 4.3 %
LYMPHOCYTES # BLD AUTO: 1.32 X10(3) UL (ref 1–4)
LYMPHOCYTES NFR BLD AUTO: 20.2 %
MCH RBC QN AUTO: 30.7 PG (ref 26–34)
MCHC RBC AUTO-ENTMCNC: 29.9 G/DL (ref 31–37)
MCV RBC AUTO: 102.6 FL
MONOCYTES # BLD AUTO: 0.66 X10(3) UL (ref 0.1–1)
MONOCYTES NFR BLD AUTO: 10.1 %
NEUTROPHILS # BLD AUTO: 4.04 X10 (3) UL (ref 1.5–7.7)
NEUTROPHILS # BLD AUTO: 4.04 X10(3) UL (ref 1.5–7.7)
NEUTROPHILS NFR BLD AUTO: 61.8 %
OSMOLALITY SERPL CALC.SUM OF ELEC: 314 MOSM/KG (ref 275–295)
PLATELET # BLD AUTO: 229 10(3)UL (ref 150–450)
POTASSIUM SERPL-SCNC: 3.7 MMOL/L (ref 3.5–5.1)
PROCALCITONIN SERPL-MCNC: 0.19 NG/ML (ref ?–0.16)
RBC # BLD AUTO: 3.03 X10(6)UL
SODIUM SERPL-SCNC: 148 MMOL/L (ref 136–145)
WBC # BLD AUTO: 6.5 X10(3) UL (ref 4–11)

## 2022-09-29 PROCEDURE — 99222 1ST HOSP IP/OBS MODERATE 55: CPT | Performed by: NURSE PRACTITIONER

## 2022-09-29 RX ORDER — IPRATROPIUM BROMIDE AND ALBUTEROL SULFATE 2.5; .5 MG/3ML; MG/3ML
3 SOLUTION RESPIRATORY (INHALATION) EVERY 6 HOURS PRN
Qty: 360 ML | Refills: 2 | Status: SHIPPED | OUTPATIENT
Start: 2022-09-29

## 2022-09-29 RX ORDER — FLUTICASONE PROPIONATE 50 MCG
2 SPRAY, SUSPENSION (ML) NASAL DAILY
Status: DISCONTINUED | OUTPATIENT
Start: 2022-09-29 | End: 2022-09-29

## 2022-09-29 RX ORDER — FLUTICASONE PROPIONATE 50 MCG
2 SPRAY, SUSPENSION (ML) NASAL DAILY
Qty: 1 EACH | Refills: 0 | Status: SHIPPED | OUTPATIENT
Start: 2022-09-29

## 2022-09-29 RX ORDER — BENZONATATE 100 MG/1
100 CAPSULE ORAL 2 TIMES DAILY PRN
Status: DISCONTINUED | OUTPATIENT
Start: 2022-09-29 | End: 2022-09-29

## 2022-09-29 RX ORDER — DEXTROSE MONOHYDRATE 50 MG/ML
INJECTION, SOLUTION INTRAVENOUS CONTINUOUS
Status: ACTIVE | OUTPATIENT
Start: 2022-09-29 | End: 2022-09-29

## 2022-09-29 RX ORDER — AMOXICILLIN AND CLAVULANATE POTASSIUM 400; 57 MG/5ML; MG/5ML
800 POWDER, FOR SUSPENSION ORAL 2 TIMES DAILY
Qty: 140 ML | Refills: 0 | Status: SHIPPED | OUTPATIENT
Start: 2022-09-29 | End: 2022-10-06

## 2022-09-29 NOTE — OCCUPATIONAL THERAPY NOTE
Duplicate OT order placed, pt is bed bound at home and uses dino lift at home, pt has assist for all ADLs and IADLs, no skilled OT needs at this time. OT will sign off at this time.

## 2022-09-29 NOTE — CM/SW NOTE
09/29/22 1500   Discharge disposition   Expected discharge disposition Home or [address] [place] [address] Provider Home   DME/Infusion Providers [facility]   Discharge transportation [facility]     Notified by RN pt is medically cleared for discharge. CARLEY confirmed pt's address with pt's dtr. [facility] arranged for 4pm. Updated RN and pt's dtr who is agreeable to discharge plan. PCS form completed and available for RN to print. SW received order to resume palliative care, pt is current with [facility].      [facility]  [phone]    [facility]  Discharge Planner

## 2022-09-29 NOTE — SLP NOTE
SPEECH DAILY NOTE - INPATIENT    ASSESSMENT & PLAN   ASSESSMENT  Patient seen for swallowing follow up post VFSS completion. Patient began pleasure feeds last night with good overt tolerance per patient daughter. Patient daughter at bedside and feeing patient breakfast.  Patient fully alert and engaged throughout. Patient daughter feeding very small bites of puree and moderately thick liquids by tsp. Appropriate pace of presentation noted. Patient with a single subtle cough noted after multiple trials of puree and moderately thick liquids. Patient daughter taking great care to attempt to reduce likelihood of aspiration with slow rate and small bites and sips. She reports that typical mealtimes range from 30-60 minutes depending on patient level of alertness.       Diet Recommendations - Solids: NPO  Diet Recommendations - Liquids: NPO          Medication Administration Recommendations: Non-oral    Patient Experiencing Pain: No                Discharge Recommendations  Discharge Recommendations/Plan: Undetermined    Treatment Plan  Treatment Plan/Recommendations:  (follow up education as needed)    Interdisciplinary Communication: Discussed with RN  Disussed with other staff            FOLLOW UP  Follow Up Needed (Documentation Required): Yes  SLP Follow-up Date: 10/03/22  Number of Visits to Meet Established Goals: 1    Session: 1 of 1, added 1 more visit    If you have any questions, please contact Lauren Saha Hitesh [phone] [facility]  Pager [phone]

## 2022-09-29 NOTE — PLAN OF CARE
Patient lethargic, responds to painful stimuli, per report this is baseline. Family at bedside, and daughter staying overnight. Patient is resting in bed, appears comfortable, no distress. Patient resting on 1-2L O2 NC, remains on tele and  monitoring. Patient with high BP overnight, PRN hydralazine given, see MAR. Patient incontinent x2, patient in brief. Pt is bed bound, q2h turns. Family providing comfort feeds. IV abx. IVF. Fall precautions in place. Call light in reach. Aspiration precautions in place. Family does not want to meet with palliative services. Problem: RESPIRATORY - ADULT  Goal: Achieves optimal ventilation and oxygenation  Description: INTERVENTIONS:  - Assess for changes in respiratory status  - Assess for changes in mentation and behavior  - Position to facilitate oxygenation and minimize respiratory effort  - Oxygen supplementation based on oxygen saturation or ABGs  - Provide Smoking Cessation handout, if applicable  - Encourage broncho-pulmonary hygiene including cough, deep breathe, Incentive Spirometry  - Assess the need for suctioning and perform as needed  - Assess and instruct to report SOB or any respiratory difficulty  - Respiratory Therapy support as indicated  - Manage/alleviate anxiety  - Monitor for signs/symptoms of CO2 retention  Outcome: Progressing     Problem: SAFETY ADULT - FALL  Goal: Free from fall injury  Description: INTERVENTIONS:  - Assess pt frequently for physical needs  - Identify cognitive and physical deficits and behaviors that affect risk of falls.   - [place] fall precautions as indicated by assessment.  - Educate pt/family on patient safety including physical limitations  - Instruct pt to call for assistance with activity based on assessment  - Modify environment to reduce risk of injury  - Provide assistive devices as appropriate  - Consider OT/PT consult to assist with strengthening/mobility  - Encourage toileting schedule  Outcome: Progressing Problem: MUSCULOSKELETAL - ADULT  Goal: Return mobility to safest level of function  Description: INTERVENTIONS:  - Assess patient stability and activity tolerance for standing, transferring and ambulating w/ or w/o assistive devices  - Assist with transfers and ambulation using safe patient handling equipment as needed  - Ensure adequate protection for wounds/incisions during mobilization  - Obtain PT/OT consults as needed  - Advance activity as appropriate  - Communicate ordered activity level and limitations with patient/family  Outcome: Progressing  Goal: Maintain proper alignment of affected body part  Description: INTERVENTIONS:  - Support and protect limb and body alignment per provider's orders  - Instruct and reinforce with patient and family use of appropriate assistive device and precautions (e.g. spinal or hip dislocation precautions)  Outcome: Progressing     Problem: GASTROINTESTINAL - ADULT  Goal: Minimal or absence of nausea and vomiting  Description: INTERVENTIONS:  - Maintain adequate hydration with IV or PO as ordered and tolerated  - Nasogastric tube to low intermittent suction as ordered  - Evaluate effectiveness of ordered antiemetic medications  - Provide nonpharmacologic comfort measures as appropriate  - Advance diet as tolerated, if ordered  - Obtain nutritional consult as needed  - Evaluate fluid balance  Outcome: Progressing  Goal: Maintains or returns to baseline bowel function  Description: INTERVENTIONS:  - Assess bowel function  - Maintain adequate hydration with IV or PO as ordered and tolerated  - Evaluate effectiveness of GI medications  - Encourage mobilization and activity  - Obtain nutritional consult as needed  - Establish a toileting routine/schedule  - Consider collaborating with pharmacy to review patient's medication profile  Outcome: Progressing  Goal: Maintains adequate nutritional intake (undernourished)  Description: INTERVENTIONS:  - Monitor percentage of each meal consumed  - Identify factors contributing to decreased intake, treat as appropriate  - Assist with meals as needed  - Monitor I&O, WT and lab values  - Obtain nutritional consult as needed  - Optimize oral hygiene and moisture  - Encourage food from home; allow for food preferences  - Enhance eating environment  Outcome: Progressing  Goal: Achieves appropriate nutritional intake (bariatric)  Description: INTERVENTIONS:  - Monitor for over-consumption  - Identify factors contributing to increased intake, treat as appropriate  - Monitor I&O, WT and lab values  - Obtain nutritional consult as needed  - Evaluate psychosocial factors contributing to over-consumption  Outcome: Progressing     Problem: Patient/Family Goals  Goal: Patient/Family Long Term Goal  Description: Patient's Long Term Goal: Discharge with adequate resources    Interventions:  - SLP eval  -Oral suctioning   -IV abx  - See additional Care Plan goals for specific interventions  Outcome: Progressing  Goal: Patient/Family Short Term Goal  Description: Patient's Short Term Goal:   9/27 am: Speech eval  9/27 noc: rest, sleep, airway clearance  9/28: rest, comfort  9/28 noc: rest, comfort  Interventions:   - NPO  -oral suctioning  -oral care  - See additional Care Plan goals for specific interventions  Outcome: Progressing

## 2022-09-29 NOTE — DISCHARGE PLANNING
NURSING DISCHARGE NOTE    Discharged Home via Ambulance. Accompanied by Support staff  Belongings Taken by patient/family. Patient assessed and ready for discharge. IV dc'd. C/D/I. Discharge instructions and follow up gone over with patients daughter and niece at bedside. Meds to beds brought medications to the daughter at bedside to bring home. All questions answered at this time, verbalized understanding. To home via ambulance at 1600.

## 2022-09-29 NOTE — CM/SW NOTE
Notified by MD pt's dtr is requesting suction machine and transportation home. SW met with pt's dtr at bedside to discuss discharge planning. Pt's dtr [facility]. SW notified [facility] liaison. Pt's dtr requested an oral suction machine. SW placed DME order and progress note with cosigned needed, perfect served MD for cosign. CARLEY sent DME referral in [address] [place] [address] and notified Jaxon Tillman at [facility] of referral.     MD acknowledged perfect serve and signed progress note. SW uploaded cosigned progress note to [address] [place] [address] referral. Inocente Cormier will continue to follow. OLIVIA Alvarez  Discharge Planner     SW received message from RN stating pt's dtr is requesting a home nebulizer. Home neb added to DME order, perfect served MD to sign DME order. Will upload signed order once available. Perfect serve acknowledged at 1:36pm    2pm - DME order has not been signed, perfect served MD.     2:20pm - Signed DME order received. Uploaded to [address] [place] [address] referral.     3pm - SW received message from [place] at [facility] stating pt is approved for the suction machine and she will contact pt's dtr to coordinate delivery. Miriam stated she received the order for the home nebulizer, but pt does not have a qualifying diagnosis. CARLEY inquired on OOP cost to purchase home neb, Miriam stated since pt has Medicaid HME cannot have pt purchase the home neb privately. [place] stated pt can purchase a nebulizer at a pharmacy. CARLEY met with pt's dtr at bedside. CARLEY informed pt's dtr HME will call to schedule suction machine delivery for today. CARLEY informed pt's dtr pt does not qualify for a nebulizer and she is able to purchase a nebulizer at a pharmacy/medical equipment store, pt's dtr verbalized understanding. 3:15pm - SW received message from Miriam at [facility] stating she has spoken with pt's dtr and delivery is scheduled for this afternoon. Updated RN.

## 2022-09-29 NOTE — HOME CARE LIAISON
Received referral via [facility] for Home Health services. Spoke w/ patients daugher and provided with list of [facility] providers from [facility], choice is [facility] [address] [place] [address] in [facility] and contact information placed on AVS.Financial interest disclosure provided. Notified CARLEY.

## 2022-09-29 NOTE — CM/SW NOTE
Patient requires an oral suction machine due to aspiration into airway, hypoxia, dyspnea, and pharyngeal dysphagia. Patient is unable to raise or clear secretions on their own.

## 2022-10-19 ENCOUNTER — HOSPITAL ENCOUNTER (EMERGENCY)
Facility: HOSPITAL | Age: 87
Discharge: HOME | End: 2022-10-19
Attending: EMERGENCY MEDICINE
Payer: MEDICARE

## 2022-10-19 ENCOUNTER — APPOINTMENT (OUTPATIENT)
Dept: ULTRASOUND IMAGING | Facility: HOSPITAL | Age: 87
End: 2022-10-19
Attending: EMERGENCY MEDICINE
Payer: MEDICARE

## 2022-10-19 ENCOUNTER — APPOINTMENT (OUTPATIENT)
Dept: GENERAL RADIOLOGY | Facility: HOSPITAL | Age: 87
End: 2022-10-19
Payer: MEDICARE

## 2022-10-19 VITALS
SYSTOLIC BLOOD PRESSURE: 148 MMHG | DIASTOLIC BLOOD PRESSURE: 75 MMHG | OXYGEN SATURATION: 97 % | HEART RATE: 79 BPM | RESPIRATION RATE: 22 BRPM

## 2022-10-19 DIAGNOSIS — J18.9 PNEUMONIA OF RIGHT LOWER LOBE DUE TO INFECTIOUS ORGANISM: ICD-10-CM

## 2022-10-19 DIAGNOSIS — Z91.89 AT RISK FOR ASPIRATION: Primary | ICD-10-CM

## 2022-10-19 LAB
ALBUMIN SERPL-MCNC: 2.9 G/DL (ref 3.4–5)
ALBUMIN/GLOB SERPL: 0.7 {RATIO} (ref 1–2)
ALP LIVER SERPL-CCNC: 69 U/L
ALT SERPL-CCNC: 17 U/L
ANION GAP SERPL CALC-SCNC: 4 MMOL/L (ref 0–18)
AST SERPL-CCNC: 27 U/L (ref 15–37)
ATRIAL RATE: 80 BPM
BASOPHILS # BLD AUTO: 0.04 X10(3) UL (ref 0–0.2)
BASOPHILS NFR BLD AUTO: 0.7 %
BILIRUB SERPL-MCNC: 0.3 MG/DL (ref 0.1–2)
BUN BLD-MCNC: 31 MG/DL (ref 7–18)
CALCIUM BLD-MCNC: 9.3 MG/DL (ref 8.5–10.1)
CHLORIDE SERPL-SCNC: 110 MMOL/L (ref 98–112)
CO2 SERPL-SCNC: 25 MMOL/L (ref 21–32)
CREAT BLD-MCNC: 1.23 MG/DL
EOSINOPHIL # BLD AUTO: 0.34 X10(3) UL (ref 0–0.7)
EOSINOPHIL NFR BLD AUTO: 5.8 %
ERYTHROCYTE [DISTWIDTH] IN BLOOD BY AUTOMATED COUNT: 13.5 %
GFR SERPLBLD BASED ON 1.73 SQ M-ARVRAT: 41 ML/MIN/1.73M2 (ref 60–?)
GLOBULIN PLAS-MCNC: 4.1 G/DL (ref 2.8–4.4)
GLUCOSE BLD-MCNC: 94 MG/DL (ref 70–99)
HCT VFR BLD AUTO: 36.9 %
HGB BLD-MCNC: 12 G/DL
IMM GRANULOCYTES # BLD AUTO: 0.05 X10(3) UL (ref 0–1)
IMM GRANULOCYTES NFR BLD: 0.9 %
LYMPHOCYTES # BLD AUTO: 1.66 X10(3) UL (ref 1–4)
LYMPHOCYTES NFR BLD AUTO: 28.3 %
MCH RBC QN AUTO: 31.1 PG (ref 26–34)
MCHC RBC AUTO-ENTMCNC: 32.5 G/DL (ref 31–37)
MCV RBC AUTO: 95.6 FL
MONOCYTES # BLD AUTO: 0.61 X10(3) UL (ref 0.1–1)
MONOCYTES NFR BLD AUTO: 10.4 %
NEUTROPHILS # BLD AUTO: 3.16 X10 (3) UL (ref 1.5–7.7)
NEUTROPHILS # BLD AUTO: 3.16 X10(3) UL (ref 1.5–7.7)
NEUTROPHILS NFR BLD AUTO: 53.9 %
NT-PROBNP SERPL-MCNC: 30 PG/ML (ref ?–450)
OSMOLALITY SERPL CALC.SUM OF ELEC: 294 MOSM/KG (ref 275–295)
P AXIS: 50 DEGREES
P-R INTERVAL: 236 MS
PLATELET # BLD AUTO: 230 10(3)UL (ref 150–450)
POTASSIUM SERPL-SCNC: 5.3 MMOL/L (ref 3.5–5.1)
PROT SERPL-MCNC: 7 G/DL (ref 6.4–8.2)
Q-T INTERVAL: 400 MS
QRS DURATION: 94 MS
QTC CALCULATION (BEZET): 461 MS
R AXIS: -4 DEGREES
RBC # BLD AUTO: 3.86 X10(6)UL
SODIUM SERPL-SCNC: 139 MMOL/L (ref 136–145)
T AXIS: 42 DEGREES
TROPONIN I HIGH SENSITIVITY: 11 NG/L
VENTRICULAR RATE: 80 BPM
WBC # BLD AUTO: 5.9 X10(3) UL (ref 4–11)

## 2022-10-19 PROCEDURE — 84484 ASSAY OF TROPONIN QUANT: CPT | Performed by: EMERGENCY MEDICINE

## 2022-10-19 PROCEDURE — 83880 ASSAY OF NATRIURETIC PEPTIDE: CPT | Performed by: EMERGENCY MEDICINE

## 2022-10-19 PROCEDURE — 93970 EXTREMITY STUDY: CPT | Performed by: EMERGENCY MEDICINE

## 2022-10-19 PROCEDURE — 99285 EMERGENCY DEPT VISIT HI MDM: CPT

## 2022-10-19 PROCEDURE — 36415 COLL VENOUS BLD VENIPUNCTURE: CPT

## 2022-10-19 PROCEDURE — 71045 X-RAY EXAM CHEST 1 VIEW: CPT

## 2022-10-19 PROCEDURE — 93010 ELECTROCARDIOGRAM REPORT: CPT

## 2022-10-19 PROCEDURE — 99284 EMERGENCY DEPT VISIT MOD MDM: CPT

## 2022-10-19 PROCEDURE — 85025 COMPLETE CBC W/AUTO DIFF WBC: CPT | Performed by: EMERGENCY MEDICINE

## 2022-10-19 PROCEDURE — 93005 ELECTROCARDIOGRAM TRACING: CPT

## 2022-10-19 PROCEDURE — 80053 COMPREHEN METABOLIC PANEL: CPT | Performed by: EMERGENCY MEDICINE

## 2022-10-19 NOTE — ED INITIAL ASSESSMENT (HPI)
PT TO THE ED VIA EMS FROM HOME. PTS DAUGHTER AND EMS STATED SHE JUST HAD ASPIRATION PNEUMONIA AND THE FAMILY WAS WORRIED BECAUSE THEY COULD SEE HER CAROTIDS PULSING IN HER NECK AND ARE WORRIED THAT THERE MAY BE TOO MUCH FLUID ON HER. PT IS BASELINE PER DAUGHTER AND NO ENGLISH SPEAKING.

## 2022-12-01 ENCOUNTER — HOSPITAL ENCOUNTER (INPATIENT)
Facility: HOSPITAL | Age: 87
LOS: 1 days | Discharge: HOME HEALTH | End: 2022-12-04
Attending: EMERGENCY MEDICINE | Admitting: HOSPITALIST
Payer: MEDICARE

## 2022-12-01 ENCOUNTER — APPOINTMENT (OUTPATIENT)
Dept: CT IMAGING | Facility: HOSPITAL | Age: 87
End: 2022-12-01
Attending: EMERGENCY MEDICINE
Payer: MEDICARE

## 2022-12-01 ENCOUNTER — APPOINTMENT (OUTPATIENT)
Dept: GENERAL RADIOLOGY | Facility: HOSPITAL | Age: 87
End: 2022-12-01
Attending: EMERGENCY MEDICINE
Payer: MEDICARE

## 2022-12-01 DIAGNOSIS — K92.2 UPPER GI BLEEDING: Primary | ICD-10-CM

## 2022-12-01 LAB
ALBUMIN SERPL-MCNC: 3.4 G/DL (ref 3.4–5)
ALBUMIN/GLOB SERPL: 0.7 {RATIO} (ref 1–2)
ALP LIVER SERPL-CCNC: 91 U/L
ALT SERPL-CCNC: 17 U/L
ANION GAP SERPL CALC-SCNC: 4 MMOL/L (ref 0–18)
ANTIBODY SCREEN: NEGATIVE
APTT PPP: 24.3 SECONDS (ref 23.3–35.6)
AST SERPL-CCNC: 26 U/L (ref 15–37)
ATRIAL RATE: 92 BPM
BASOPHILS # BLD AUTO: 0.04 X10(3) UL (ref 0–0.2)
BASOPHILS NFR BLD AUTO: 0.3 %
BILIRUB SERPL-MCNC: 0.3 MG/DL (ref 0.1–2)
BUN BLD-MCNC: 31 MG/DL (ref 7–18)
CALCIUM BLD-MCNC: 9.6 MG/DL (ref 8.5–10.1)
CHLORIDE SERPL-SCNC: 112 MMOL/L (ref 98–112)
CO2 SERPL-SCNC: 24 MMOL/L (ref 21–32)
CREAT BLD-MCNC: 1.2 MG/DL
EOSINOPHIL # BLD AUTO: 0 X10(3) UL (ref 0–0.7)
EOSINOPHIL NFR BLD AUTO: 0 %
ERYTHROCYTE [DISTWIDTH] IN BLOOD BY AUTOMATED COUNT: 13.3 %
GFR SERPLBLD BASED ON 1.73 SQ M-ARVRAT: 42 ML/MIN/1.73M2 (ref 60–?)
GLOBULIN PLAS-MCNC: 4.7 G/DL (ref 2.8–4.4)
GLUCOSE BLD-MCNC: 163 MG/DL (ref 70–99)
HCT VFR BLD AUTO: 38.1 %
HCT VFR BLD AUTO: 45.4 %
HGB BLD-MCNC: 12 G/DL
HGB BLD-MCNC: 14.5 G/DL
IMM GRANULOCYTES # BLD AUTO: 0.07 X10(3) UL (ref 0–1)
IMM GRANULOCYTES NFR BLD: 0.5 %
INR BLD: 1.03 (ref 0.85–1.16)
LIPASE SERPL-CCNC: 114 U/L (ref 73–393)
LYMPHOCYTES # BLD AUTO: 0.43 X10(3) UL (ref 1–4)
LYMPHOCYTES NFR BLD AUTO: 3.2 %
MCH RBC QN AUTO: 30.4 PG (ref 26–34)
MCHC RBC AUTO-ENTMCNC: 31.9 G/DL (ref 31–37)
MCV RBC AUTO: 95.2 FL
MONOCYTES # BLD AUTO: 0.34 X10(3) UL (ref 0.1–1)
MONOCYTES NFR BLD AUTO: 2.5 %
NEUTROPHILS # BLD AUTO: 12.56 X10 (3) UL (ref 1.5–7.7)
NEUTROPHILS # BLD AUTO: 12.56 X10(3) UL (ref 1.5–7.7)
NEUTROPHILS NFR BLD AUTO: 93.5 %
OSMOLALITY SERPL CALC.SUM OF ELEC: 300 MOSM/KG (ref 275–295)
P AXIS: 61 DEGREES
P-R INTERVAL: 232 MS
PLATELET # BLD AUTO: 251 10(3)UL (ref 150–450)
POTASSIUM SERPL-SCNC: 4.9 MMOL/L (ref 3.5–5.1)
PROT SERPL-MCNC: 8.1 G/DL (ref 6.4–8.2)
PROTHROMBIN TIME: 13.5 SECONDS (ref 11.6–14.8)
Q-T INTERVAL: 392 MS
QRS DURATION: 92 MS
QTC CALCULATION (BEZET): 484 MS
R AXIS: 5 DEGREES
RBC # BLD AUTO: 4.77 X10(6)UL
RH BLOOD TYPE: POSITIVE
SARS-COV-2 RNA RESP QL NAA+PROBE: NOT DETECTED
SODIUM SERPL-SCNC: 140 MMOL/L (ref 136–145)
T AXIS: 48 DEGREES
VENTRICULAR RATE: 92 BPM
WBC # BLD AUTO: 13.4 X10(3) UL (ref 4–11)

## 2022-12-01 PROCEDURE — 86901 BLOOD TYPING SEROLOGIC RH(D): CPT | Performed by: EMERGENCY MEDICINE

## 2022-12-01 PROCEDURE — C9113 INJ PANTOPRAZOLE SODIUM, VIA: HCPCS | Performed by: INTERNAL MEDICINE

## 2022-12-01 PROCEDURE — 83690 ASSAY OF LIPASE: CPT | Performed by: EMERGENCY MEDICINE

## 2022-12-01 PROCEDURE — 71045 X-RAY EXAM CHEST 1 VIEW: CPT | Performed by: EMERGENCY MEDICINE

## 2022-12-01 PROCEDURE — 74176 CT ABD & PELVIS W/O CONTRAST: CPT | Performed by: EMERGENCY MEDICINE

## 2022-12-01 PROCEDURE — 80053 COMPREHEN METABOLIC PANEL: CPT | Performed by: EMERGENCY MEDICINE

## 2022-12-01 PROCEDURE — C9113 INJ PANTOPRAZOLE SODIUM, VIA: HCPCS | Performed by: EMERGENCY MEDICINE

## 2022-12-01 PROCEDURE — 86900 BLOOD TYPING SEROLOGIC ABO: CPT | Performed by: EMERGENCY MEDICINE

## 2022-12-01 PROCEDURE — 85014 HEMATOCRIT: CPT | Performed by: HOSPITALIST

## 2022-12-01 PROCEDURE — 99285 EMERGENCY DEPT VISIT HI MDM: CPT

## 2022-12-01 PROCEDURE — 96374 THER/PROPH/DIAG INJ IV PUSH: CPT

## 2022-12-01 PROCEDURE — 93005 ELECTROCARDIOGRAM TRACING: CPT

## 2022-12-01 PROCEDURE — 86850 RBC ANTIBODY SCREEN: CPT | Performed by: EMERGENCY MEDICINE

## 2022-12-01 PROCEDURE — 96375 TX/PRO/DX INJ NEW DRUG ADDON: CPT

## 2022-12-01 PROCEDURE — 85730 THROMBOPLASTIN TIME PARTIAL: CPT | Performed by: EMERGENCY MEDICINE

## 2022-12-01 PROCEDURE — 85018 HEMOGLOBIN: CPT | Performed by: INTERNAL MEDICINE

## 2022-12-01 PROCEDURE — 85025 COMPLETE CBC W/AUTO DIFF WBC: CPT | Performed by: EMERGENCY MEDICINE

## 2022-12-01 PROCEDURE — 96361 HYDRATE IV INFUSION ADD-ON: CPT

## 2022-12-01 PROCEDURE — 85610 PROTHROMBIN TIME: CPT | Performed by: EMERGENCY MEDICINE

## 2022-12-01 PROCEDURE — 93010 ELECTROCARDIOGRAM REPORT: CPT

## 2022-12-01 PROCEDURE — 70450 CT HEAD/BRAIN W/O DYE: CPT | Performed by: EMERGENCY MEDICINE

## 2022-12-01 PROCEDURE — 85018 HEMOGLOBIN: CPT | Performed by: HOSPITALIST

## 2022-12-01 RX ORDER — FAMOTIDINE 20 MG/1
20 TABLET, FILM COATED ORAL EVERY OTHER DAY
COMMUNITY
End: 2022-12-04

## 2022-12-01 RX ORDER — POLYETHYLENE GLYCOL 3350 17 G/17G
17 POWDER, FOR SOLUTION ORAL DAILY PRN
Status: DISCONTINUED | OUTPATIENT
Start: 2022-12-01 | End: 2022-12-04

## 2022-12-01 RX ORDER — ACETAMINOPHEN 500 MG
500 TABLET ORAL EVERY 4 HOURS PRN
Status: DISCONTINUED | OUTPATIENT
Start: 2022-12-01 | End: 2022-12-04

## 2022-12-01 RX ORDER — BENZONATATE 100 MG/1
200 CAPSULE ORAL 3 TIMES DAILY PRN
Status: DISCONTINUED | OUTPATIENT
Start: 2022-12-01 | End: 2022-12-04

## 2022-12-01 RX ORDER — IPRATROPIUM BROMIDE AND ALBUTEROL SULFATE 2.5; .5 MG/3ML; MG/3ML
3 SOLUTION RESPIRATORY (INHALATION) EVERY 6 HOURS PRN
Status: DISCONTINUED | OUTPATIENT
Start: 2022-12-01 | End: 2022-12-04

## 2022-12-01 RX ORDER — ONDANSETRON 2 MG/ML
4 INJECTION INTRAMUSCULAR; INTRAVENOUS ONCE
Status: COMPLETED | OUTPATIENT
Start: 2022-12-01 | End: 2022-12-01

## 2022-12-01 RX ORDER — BISACODYL 10 MG
10 SUPPOSITORY, RECTAL RECTAL
Status: DISCONTINUED | OUTPATIENT
Start: 2022-12-01 | End: 2022-12-04

## 2022-12-01 RX ORDER — HYDRALAZINE HYDROCHLORIDE 25 MG/1
25 TABLET, FILM COATED ORAL EVERY 8 HOURS
Status: DISCONTINUED | OUTPATIENT
Start: 2022-12-01 | End: 2022-12-02

## 2022-12-01 RX ORDER — FLUTICASONE PROPIONATE 50 MCG
2 SPRAY, SUSPENSION (ML) NASAL DAILY
Status: DISCONTINUED | OUTPATIENT
Start: 2022-12-01 | End: 2022-12-04

## 2022-12-01 RX ORDER — SODIUM CHLORIDE, SODIUM LACTATE, POTASSIUM CHLORIDE, CALCIUM CHLORIDE 600; 310; 30; 20 MG/100ML; MG/100ML; MG/100ML; MG/100ML
INJECTION, SOLUTION INTRAVENOUS CONTINUOUS
Status: DISCONTINUED | OUTPATIENT
Start: 2022-12-01 | End: 2022-12-02

## 2022-12-01 RX ORDER — ONDANSETRON 2 MG/ML
4 INJECTION INTRAMUSCULAR; INTRAVENOUS EVERY 6 HOURS PRN
Status: DISCONTINUED | OUTPATIENT
Start: 2022-12-01 | End: 2022-12-04

## 2022-12-01 RX ORDER — SENNOSIDES 8.6 MG
17.2 TABLET ORAL NIGHTLY PRN
Status: DISCONTINUED | OUTPATIENT
Start: 2022-12-01 | End: 2022-12-04

## 2022-12-01 NOTE — ED INITIAL ASSESSMENT (HPI)
Brought by EMS   From home with  Complaints of vomited dark red in color today 2 hours ago . Denies abdominal pain . Last bowel movement  Last night it was normal  As per daughter. Known case of dementia, GERD  AndHTN  does not  Answer questions.

## 2022-12-01 NOTE — ED QUICK NOTES
Orders for admission, patient is aware of plan and ready to go upstairs.  Any questions, please call ED RN Matteo Khan at extension [phone]    Patient Covid vaccination status: Fully vaccinated     COVID Test Ordered in ED: Rapid SARS-CoV-2 by PCR    COVID Suspicion at Admission: Low clinical suspicion for COVID    Running Infusions:  None    Mental Status/LOC at time of transport: DEMENTIA    Other pertinent information: DAUGHTER  AT BED SIDE  CIWA score: N/A   NIH score:  N/A

## 2022-12-02 ENCOUNTER — APPOINTMENT (OUTPATIENT)
Dept: GENERAL RADIOLOGY | Facility: HOSPITAL | Age: 87
End: 2022-12-02
Attending: HOSPITALIST
Payer: MEDICARE

## 2022-12-02 LAB
ANION GAP SERPL CALC-SCNC: 4 MMOL/L (ref 0–18)
BASOPHILS # BLD AUTO: 0.05 X10(3) UL (ref 0–0.2)
BASOPHILS NFR BLD AUTO: 0.6 %
BUN BLD-MCNC: 34 MG/DL (ref 7–18)
CALCIUM BLD-MCNC: 8.8 MG/DL (ref 8.5–10.1)
CHLORIDE SERPL-SCNC: 118 MMOL/L (ref 98–112)
CO2 SERPL-SCNC: 24 MMOL/L (ref 21–32)
CREAT BLD-MCNC: 1.14 MG/DL
EOSINOPHIL # BLD AUTO: 0.05 X10(3) UL (ref 0–0.7)
EOSINOPHIL NFR BLD AUTO: 0.6 %
ERYTHROCYTE [DISTWIDTH] IN BLOOD BY AUTOMATED COUNT: 13.6 %
GFR SERPLBLD BASED ON 1.73 SQ M-ARVRAT: 45 ML/MIN/1.73M2 (ref 60–?)
GLUCOSE BLD-MCNC: 112 MG/DL (ref 70–99)
HCT VFR BLD AUTO: 35 %
HGB BLD-MCNC: 10.8 G/DL
HGB BLD-MCNC: 10.9 G/DL
HGB BLD-MCNC: 10.9 G/DL
HGB BLD-MCNC: 11.6 G/DL
IMM GRANULOCYTES # BLD AUTO: 0.03 X10(3) UL (ref 0–1)
IMM GRANULOCYTES NFR BLD: 0.4 %
LYMPHOCYTES # BLD AUTO: 1.21 X10(3) UL (ref 1–4)
LYMPHOCYTES NFR BLD AUTO: 14.1 %
MCH RBC QN AUTO: 30.1 PG (ref 26–34)
MCHC RBC AUTO-ENTMCNC: 31.1 G/DL (ref 31–37)
MCV RBC AUTO: 96.7 FL
MONOCYTES # BLD AUTO: 0.72 X10(3) UL (ref 0.1–1)
MONOCYTES NFR BLD AUTO: 8.4 %
NEUTROPHILS # BLD AUTO: 6.5 X10 (3) UL (ref 1.5–7.7)
NEUTROPHILS # BLD AUTO: 6.5 X10(3) UL (ref 1.5–7.7)
NEUTROPHILS NFR BLD AUTO: 75.9 %
OSMOLALITY SERPL CALC.SUM OF ELEC: 310 MOSM/KG (ref 275–295)
PLATELET # BLD AUTO: 200 10(3)UL (ref 150–450)
POTASSIUM SERPL-SCNC: 4.5 MMOL/L (ref 3.5–5.1)
PROCALCITONIN SERPL-MCNC: <0.05 NG/ML (ref ?–0.16)
RBC # BLD AUTO: 3.62 X10(6)UL
SODIUM SERPL-SCNC: 146 MMOL/L (ref 136–145)
WBC # BLD AUTO: 8.6 X10(3) UL (ref 4–11)

## 2022-12-02 PROCEDURE — 92526 ORAL FUNCTION THERAPY: CPT

## 2022-12-02 PROCEDURE — 85025 COMPLETE CBC W/AUTO DIFF WBC: CPT | Performed by: HOSPITALIST

## 2022-12-02 PROCEDURE — 85018 HEMOGLOBIN: CPT | Performed by: HOSPITALIST

## 2022-12-02 PROCEDURE — 84145 PROCALCITONIN (PCT): CPT | Performed by: HOSPITALIST

## 2022-12-02 PROCEDURE — 92610 EVALUATE SWALLOWING FUNCTION: CPT

## 2022-12-02 PROCEDURE — 85018 HEMOGLOBIN: CPT | Performed by: INTERNAL MEDICINE

## 2022-12-02 PROCEDURE — 94640 AIRWAY INHALATION TREATMENT: CPT

## 2022-12-02 PROCEDURE — 71045 X-RAY EXAM CHEST 1 VIEW: CPT | Performed by: HOSPITALIST

## 2022-12-02 PROCEDURE — 80048 BASIC METABOLIC PNL TOTAL CA: CPT | Performed by: HOSPITALIST

## 2022-12-02 PROCEDURE — 85014 HEMATOCRIT: CPT | Performed by: HOSPITALIST

## 2022-12-02 PROCEDURE — 36415 COLL VENOUS BLD VENIPUNCTURE: CPT | Performed by: INTERNAL MEDICINE

## 2022-12-02 RX ORDER — HYDRALAZINE HYDROCHLORIDE 20 MG/ML
5 INJECTION INTRAMUSCULAR; INTRAVENOUS EVERY 6 HOURS SCHEDULED
Status: DISCONTINUED | OUTPATIENT
Start: 2022-12-02 | End: 2022-12-04

## 2022-12-02 RX ORDER — HYDRALAZINE HYDROCHLORIDE 10 MG/1
10 TABLET, FILM COATED ORAL EVERY 8 HOURS SCHEDULED
Status: DISCONTINUED | OUTPATIENT
Start: 2022-12-02 | End: 2022-12-02

## 2022-12-02 RX ORDER — DEXTROSE AND SODIUM CHLORIDE 5; .45 G/100ML; G/100ML
INJECTION, SOLUTION INTRAVENOUS CONTINUOUS
Status: DISCONTINUED | OUTPATIENT
Start: 2022-12-02 | End: 2022-12-03

## 2022-12-02 RX ORDER — PANTOPRAZOLE SODIUM 40 MG/1
40 TABLET, DELAYED RELEASE ORAL
Status: DISCONTINUED | OUTPATIENT
Start: 2022-12-02 | End: 2022-12-04

## 2022-12-02 RX ORDER — PANTOPRAZOLE SODIUM 40 MG/1
40 TABLET, DELAYED RELEASE ORAL
Qty: 60 TABLET | Refills: 0 | Status: SHIPPED | OUTPATIENT
Start: 2022-12-02

## 2022-12-02 NOTE — PHYSICAL THERAPY NOTE
PT order received and chart reviewed. Pt is bed bound and uses a total lift at home for mobility. No skilled IPPT needs at this time - will sign off.

## 2022-12-02 NOTE — OCCUPATIONAL THERAPY NOTE
OT order received and chart reviewed. Pt is bed bound and uses a total lift at home for mobility. Pt has assist with all ADL. No skilled IPOT needs at this time - will sign off. RN aware.      Thank you for allowing me to care for this patient,   Melanie Saeed OTR/L   Occupational Therapist   [facility]   Pager: [phone]

## 2022-12-02 NOTE — PLAN OF CARE
Pt lethargic during morning. VSS, Stable on RA  Pt is non verbal but responds to painful stimli  Ronchi upon ausculation of lungs. MD notified. CXR ordered  Family agreed to no EGD per Azell Litter. On puree/thick liquid diet  A bit more alert during afternoon. Speech eval done. No change to diet  Bedfast, Incontinent to bowel and urine. IV abx, fluids running. [place] requested per daughter. No bleeding or emesis during shift.      Plan- Discharge if HGB is stable and no emesis

## 2022-12-02 NOTE — PROGRESS NOTES
Admitted from ER. 2 person skin assessment done. All skin intact. Last BM 11/30/22. Pt incontinent of both bowel and bladder. Pt lives with her daughter Sylvester Chow, who is her caregiver. She is bedbound at home. She normally is fed, is on a puree diet with honey thick liquids. On room air. Daughter states pt doesn't take anything for pain. She also states her sister does come to assist her when she is not working. IVFs hung as ordered. Anhhong states she will be staying the night and interpreting for pt.

## 2022-12-02 NOTE — PLAN OF CARE
VSS on room air. Patient is non-verbal and bed bound. Skin intact. Incontinent of bowel and bladder. NPO except meds. IVF's as ordered. Abdomen soft, bowel sounds present. No emesis during this shift. Daughter at bedside. Plan of care reviewed. Bed alarm on. Call light within reach.

## 2022-12-02 NOTE — SLP NOTE
Order received, chart reviewed. Met with patient daughter at bedside. Patient too lethargic for po at this time. SLP reviewed aspiration risk established during VFSS from most recent admission 9/28/22. At that time, SLP recommended NPO and family elected to accept risk of aspiration and feed patient puree and moderately thick liquids. Patient daughter at bedside reports they fed patient very slowly and she was able to eat well without coughing up to as recently as this past Wednesday. Patient daughter recounted 3 episodes of hematemesis 12/1/22. Of note, patient CXR on admission revealed no acute cardiopulmonary abnormality but repeat CXR completed earlier today shows patchy bibasilar airspace opacities concerning for developing pneumonia or pneumonitis as well as small bilateral pleural effusions. Discussed with daughter at bedside holding po given current mental status and this service will continue to follow, assessing as appropriate. Patient daughter in agreement and appreciative of visit.     Daniella Saha Hitesh 87 CCC-SLP  Pager [phone]

## 2022-12-03 LAB
ANION GAP SERPL CALC-SCNC: 7 MMOL/L (ref 0–18)
BASOPHILS # BLD AUTO: 0.05 X10(3) UL (ref 0–0.2)
BASOPHILS NFR BLD AUTO: 0.5 %
BUN BLD-MCNC: 27 MG/DL (ref 7–18)
CALCIUM BLD-MCNC: 8.7 MG/DL (ref 8.5–10.1)
CHLORIDE SERPL-SCNC: 113 MMOL/L (ref 98–112)
CO2 SERPL-SCNC: 23 MMOL/L (ref 21–32)
CREAT BLD-MCNC: 1.15 MG/DL
EOSINOPHIL # BLD AUTO: 0.16 X10(3) UL (ref 0–0.7)
EOSINOPHIL NFR BLD AUTO: 1.7 %
ERYTHROCYTE [DISTWIDTH] IN BLOOD BY AUTOMATED COUNT: 13.7 %
GFR SERPLBLD BASED ON 1.73 SQ M-ARVRAT: 44 ML/MIN/1.73M2 (ref 60–?)
GLUCOSE BLD-MCNC: 120 MG/DL (ref 70–99)
HCT VFR BLD AUTO: 32.4 %
HGB BLD-MCNC: 10.3 G/DL
HGB BLD-MCNC: 10.3 G/DL
HGB BLD-MCNC: 10.7 G/DL
HGB BLD-MCNC: 11.2 G/DL
IMM GRANULOCYTES # BLD AUTO: 0.02 X10(3) UL (ref 0–1)
IMM GRANULOCYTES NFR BLD: 0.2 %
LYMPHOCYTES # BLD AUTO: 1.34 X10(3) UL (ref 1–4)
LYMPHOCYTES NFR BLD AUTO: 14.5 %
MCH RBC QN AUTO: 30.9 PG (ref 26–34)
MCHC RBC AUTO-ENTMCNC: 31.8 G/DL (ref 31–37)
MCV RBC AUTO: 97.3 FL
MONOCYTES # BLD AUTO: 0.78 X10(3) UL (ref 0.1–1)
MONOCYTES NFR BLD AUTO: 8.5 %
NEUTROPHILS # BLD AUTO: 6.88 X10 (3) UL (ref 1.5–7.7)
NEUTROPHILS # BLD AUTO: 6.88 X10(3) UL (ref 1.5–7.7)
NEUTROPHILS NFR BLD AUTO: 74.6 %
OSMOLALITY SERPL CALC.SUM OF ELEC: 302 MOSM/KG (ref 275–295)
PLATELET # BLD AUTO: 209 10(3)UL (ref 150–450)
POTASSIUM SERPL-SCNC: 3.5 MMOL/L (ref 3.5–5.1)
RBC # BLD AUTO: 3.33 X10(6)UL
SODIUM SERPL-SCNC: 143 MMOL/L (ref 136–145)
WBC # BLD AUTO: 9.2 X10(3) UL (ref 4–11)

## 2022-12-03 PROCEDURE — 80048 BASIC METABOLIC PNL TOTAL CA: CPT | Performed by: HOSPITALIST

## 2022-12-03 PROCEDURE — 85025 COMPLETE CBC W/AUTO DIFF WBC: CPT | Performed by: HOSPITALIST

## 2022-12-03 PROCEDURE — 85018 HEMOGLOBIN: CPT | Performed by: INTERNAL MEDICINE

## 2022-12-03 NOTE — CM/SW NOTE
12/03/22 0800   CM/SW Referral Data   Referral Source Social Work (self-referral)   Reason for Referral Discharge planning   Informant EMR;Clinical Staff Member   Patient Info   Patient's Current Mental Status at Time of Assessment Confused or unable to complete assessment   Patient Communication Issues Non-verbal   Patient lives with Daughter   Patient Status Prior to Admission   Services in place prior to admission [address] Provider Info [facility]   Discharge Needs   Anticipated D/C needs Home health care; Hospice; To be determined       Patient is a 81 y/o woman admitted with GIB. Pt is nonverbal, bedbound at baseline. She lives with her daughter who provides total care. Informed by [facility]  that pt is current with them for RN, PT, ST.  Orders placed for resumption of services at [place]. Per RN notes, pt's dtr may wish to consider hospice care. / to remain available for support and/or discharge planning.      Efrain Stephenson [facility]  Discharge Planner  [phone]

## 2022-12-03 NOTE — PROGRESS NOTES
12/03/22 4736   Clinical Encounter Type   Visited With Health care provider;Patient and family together  (daughter)   Patient's Supportive Strategies/Resources family and eddie - per daughter   Family Spiritual Encounters   Family Coping Open/discussion   Family Participation in Care Consistently  (daughter feeding patient at time of visit)   Family Support During Treatment Consistently   Taxonomy   Intended Effects Demonstrate caring and concern   Methods Offer spiritual/Pentecostal support;Collaborate with care team member;Encourage story-telling   Interventions Acknowledge current situation; Ask guided questions; Active listening; Identify supportive relationship(s); Explain  role;Joann   Spoke with daughter in room. Listened as she explained her role as caregiver for her mother. Daughter very compassionate and loving as she fed her mother. Strong Sikhism eddie revealed via daughter. Patient non-verbal at this time. Spiritual Care support can be requested via an Epic consult.

## 2022-12-03 NOTE — PLAN OF CARE
Patient is lethargic and nonverbal. Daughter at bedside. Patient is asleep and shows no signs of being in pain. On pureed- honey thick consistency diet. Tolerating well per daughter. XR showed pneumonia and is receiving Zosyn abx per MD order. Daughter denies pt having nay vomiting. Daughter to decide if she wants hospice for pt. Discussed plan of care with patient. Safety precautions in place.

## 2022-12-04 VITALS
OXYGEN SATURATION: 98 % | BODY MASS INDEX: 19.63 KG/M2 | SYSTOLIC BLOOD PRESSURE: 173 MMHG | DIASTOLIC BLOOD PRESSURE: 69 MMHG | WEIGHT: 100 LBS | HEART RATE: 95 BPM | RESPIRATION RATE: 17 BRPM | HEIGHT: 60 IN | TEMPERATURE: 98 F

## 2022-12-04 LAB
HGB BLD-MCNC: 10.5 G/DL
SARS-COV-2 RNA RESP QL NAA+PROBE: NOT DETECTED

## 2022-12-04 PROCEDURE — 85018 HEMOGLOBIN: CPT | Performed by: INTERNAL MEDICINE

## 2022-12-04 RX ORDER — AMOXICILLIN AND CLAVULANATE POTASSIUM 875; 125 MG/1; MG/1
1 TABLET, FILM COATED ORAL 2 TIMES DAILY
Qty: 10 TABLET | Refills: 0 | Status: SHIPPED | OUTPATIENT
Start: 2022-12-04 | End: 2022-12-09

## 2022-12-04 NOTE — PLAN OF CARE
NURSING DISCHARGE NOTE    Discharged Home via Ambulance. Accompanied by Family member  Belongings Taken by patient/family. AVS printed and discussed, IV's removed. Pt ready to discharge home.

## 2022-12-04 NOTE — PLAN OF CARE
Pt admitted 12/1 with upper GI bleed, Pt is AAOX1, non-verbal, VSS, daughter at bedside, room air, bed bound Q2 turns, dependent / feeder, IV SL, incontinent w/ brief, pills crushed in apple sauce, tolerating Pureed diet with Honey thick liquids, monitor for aspiration, daughter assists with feeding, plan for possible discharge home Sunday, Pt doing well, all needs met, all safety measures in place, call light within reach, will CTM.

## 2022-12-04 NOTE — PLAN OF CARE
Pt admitted 12/1 with upper GI bleed, Pt is AAOX1, non-verbal, VSS, daughter at bedside, room air, bed bound Q2 turns, dependent / feeder, IV SL, incontinent w/ brief, pills crushed in apple sauce, tolerating Pureed diet with Honey thick liquids, monitor for aspiration, daughter assists with feeding, plan for possible discharge home today, Pt doing well, all needs met, all safety measures in place, call light within reach, will CTM

## 2022-12-04 NOTE — PLAN OF CARE
Aox1 pt is lethargic and nonverbal. Daughter at bedside. On pureed- honey thick consistency diet. Daughter reported pt has spit up while swallowing her purred pear. Will monitor when she eats breakfast. Tolerating well per daughter. Receiving Zosyn abx per MD order. Possible discharge tomorrow if medically cleared. Discussed plan of care with patient and daughter. Safety precautions in place.

## 2022-12-04 NOTE — CM/SW NOTE
CARLEY set up ambulance transfer for patient, home 1pm. PCS form completed.      Krunal Riley LCSW  /Discharge Planner

## 2023-01-01 ENCOUNTER — HOSPITAL ENCOUNTER (INPATIENT)
Facility: HOSPITAL | Age: 88
LOS: 3 days | End: 2023-01-01
Attending: HOSPITALIST | Admitting: HOSPITALIST
Payer: OTHER MISCELLANEOUS

## 2023-01-01 ENCOUNTER — APPOINTMENT (OUTPATIENT)
Dept: ULTRASOUND IMAGING | Facility: HOSPITAL | Age: 88
End: 2023-01-01
Attending: INTERNAL MEDICINE
Payer: MEDICARE

## 2023-01-01 ENCOUNTER — HOSPITAL ENCOUNTER (INPATIENT)
Facility: HOSPITAL | Age: 88
LOS: 2 days | End: 2023-01-01
Attending: HOSPITALIST | Admitting: HOSPITALIST
Payer: OTHER MISCELLANEOUS

## 2023-01-01 ENCOUNTER — APPOINTMENT (OUTPATIENT)
Dept: GENERAL RADIOLOGY | Facility: HOSPITAL | Age: 88
End: 2023-01-01
Attending: EMERGENCY MEDICINE
Payer: MEDICARE

## 2023-01-01 ENCOUNTER — APPOINTMENT (OUTPATIENT)
Dept: CV DIAGNOSTICS | Facility: HOSPITAL | Age: 88
End: 2023-01-01
Attending: HOSPITALIST
Payer: MEDICARE

## 2023-01-01 ENCOUNTER — APPOINTMENT (OUTPATIENT)
Dept: CT IMAGING | Facility: HOSPITAL | Age: 88
End: 2023-01-01
Attending: HOSPITALIST
Payer: MEDICARE

## 2023-01-01 ENCOUNTER — HOSPITAL ENCOUNTER (INPATIENT)
Facility: HOSPITAL | Age: 88
LOS: 1 days | Discharge: HOSPICE | End: 2023-01-01
Attending: EMERGENCY MEDICINE | Admitting: HOSPITALIST
Payer: MEDICARE

## 2023-01-01 VITALS
SYSTOLIC BLOOD PRESSURE: 80 MMHG | TEMPERATURE: 98 F | RESPIRATION RATE: 22 BRPM | DIASTOLIC BLOOD PRESSURE: 29 MMHG | OXYGEN SATURATION: 66 % | HEART RATE: 144 BPM

## 2023-01-01 VITALS
BODY MASS INDEX: 17 KG/M2 | OXYGEN SATURATION: 98 % | HEART RATE: 96 BPM | DIASTOLIC BLOOD PRESSURE: 49 MMHG | SYSTOLIC BLOOD PRESSURE: 117 MMHG | WEIGHT: 89.06 LBS | RESPIRATION RATE: 20 BRPM | TEMPERATURE: 97 F

## 2023-01-01 DIAGNOSIS — E87.0 HYPERNATREMIA: Primary | ICD-10-CM

## 2023-01-01 DIAGNOSIS — N17.9 AKI (ACUTE KIDNEY INJURY) (HCC): ICD-10-CM

## 2023-01-01 DIAGNOSIS — N30.00 ACUTE CYSTITIS WITHOUT HEMATURIA: ICD-10-CM

## 2023-01-01 LAB
ALBUMIN SERPL-MCNC: 2.5 G/DL (ref 3.4–5)
ALBUMIN SERPL-MCNC: 3.4 G/DL (ref 3.4–5)
ALBUMIN/GLOB SERPL: 0.7 {RATIO} (ref 1–2)
ALP LIVER SERPL-CCNC: 77 U/L
ALT SERPL-CCNC: 22 U/L
ANION GAP SERPL CALC-SCNC: 10 MMOL/L (ref 0–18)
ANION GAP SERPL CALC-SCNC: 7 MMOL/L (ref 0–18)
ANION GAP SERPL CALC-SCNC: 8 MMOL/L (ref 0–18)
ANION GAP SERPL CALC-SCNC: 9 MMOL/L (ref 0–18)
ARTERIAL PATENCY WRIST A: POSITIVE
AST SERPL-CCNC: 23 U/L (ref 15–37)
AST SERPL-CCNC: 42 U/L (ref 15–37)
ATRIAL RATE: 94 BPM
BASE EXCESS BLDA CALC-SCNC: -5.3 MMOL/L (ref ?–2)
BASOPHILS # BLD AUTO: 0.02 X10(3) UL (ref 0–0.2)
BASOPHILS # BLD AUTO: 0.02 X10(3) UL (ref 0–0.2)
BASOPHILS NFR BLD AUTO: 0.1 %
BASOPHILS NFR BLD AUTO: 0.1 %
BILIRUB SERPL-MCNC: 0.4 MG/DL (ref 0.1–2)
BILIRUB UR QL STRIP.AUTO: NEGATIVE
BODY TEMPERATURE: 98.6 F
BUN BLD-MCNC: 24 MG/DL (ref 7–18)
BUN BLD-MCNC: 46 MG/DL (ref 7–18)
BUN BLD-MCNC: 67 MG/DL (ref 7–18)
BUN BLD-MCNC: 76 MG/DL (ref 7–18)
CALCIUM BLD-MCNC: 8 MG/DL (ref 8.5–10.1)
CALCIUM BLD-MCNC: 8.2 MG/DL (ref 8.5–10.1)
CALCIUM BLD-MCNC: 8.3 MG/DL (ref 8.5–10.1)
CALCIUM BLD-MCNC: 8.9 MG/DL (ref 8.5–10.1)
CHLORIDE SERPL-SCNC: 118 MMOL/L (ref 98–112)
CHLORIDE SERPL-SCNC: 121 MMOL/L (ref 98–112)
CHLORIDE SERPL-SCNC: 124 MMOL/L (ref 98–112)
CHLORIDE SERPL-SCNC: 124 MMOL/L (ref 98–112)
CO2 SERPL-SCNC: 13 MMOL/L (ref 21–32)
CO2 SERPL-SCNC: 14 MMOL/L (ref 21–32)
CO2 SERPL-SCNC: 19 MMOL/L (ref 21–32)
CO2 SERPL-SCNC: 20 MMOL/L (ref 21–32)
COHGB MFR BLD: 1.9 % SAT (ref 0–3)
COLOR UR AUTO: YELLOW
CREAT BLD-MCNC: 0.99 MG/DL
CREAT BLD-MCNC: 1.26 MG/DL
CREAT BLD-MCNC: 1.7 MG/DL
CREAT BLD-MCNC: 2.27 MG/DL
CREAT UR-SCNC: 26.8 MG/DL
EGFRCR SERPLBLD CKD-EPI 2021: 19 ML/MIN/1.73M2 (ref 60–?)
EGFRCR SERPLBLD CKD-EPI 2021: 27 ML/MIN/1.73M2 (ref 60–?)
EGFRCR SERPLBLD CKD-EPI 2021: 39 ML/MIN/1.73M2 (ref 60–?)
EGFRCR SERPLBLD CKD-EPI 2021: 53 ML/MIN/1.73M2 (ref 60–?)
EOSINOPHIL # BLD AUTO: 0 X10(3) UL (ref 0–0.7)
EOSINOPHIL # BLD AUTO: 0 X10(3) UL (ref 0–0.7)
EOSINOPHIL NFR BLD AUTO: 0 %
EOSINOPHIL NFR BLD AUTO: 0 %
ERYTHROCYTE [DISTWIDTH] IN BLOOD BY AUTOMATED COUNT: 17.3 %
ERYTHROCYTE [DISTWIDTH] IN BLOOD BY AUTOMATED COUNT: 17.4 %
ERYTHROCYTE [DISTWIDTH] IN BLOOD BY AUTOMATED COUNT: 17.8 %
FLUAV + FLUBV RNA SPEC NAA+PROBE: NEGATIVE
FLUAV + FLUBV RNA SPEC NAA+PROBE: NEGATIVE
GLOBULIN PLAS-MCNC: 4.7 G/DL (ref 2.8–4.4)
GLUCOSE BLD-MCNC: 134 MG/DL (ref 70–99)
GLUCOSE BLD-MCNC: 139 MG/DL (ref 70–99)
GLUCOSE BLD-MCNC: 195 MG/DL (ref 70–99)
GLUCOSE BLD-MCNC: 196 MG/DL (ref 70–99)
GLUCOSE UR STRIP.AUTO-MCNC: NORMAL MG/DL
HCO3 BLDA-SCNC: 20.8 MEQ/L (ref 21–27)
HCT VFR BLD AUTO: 32.4 %
HCT VFR BLD AUTO: 36 %
HCT VFR BLD AUTO: 42.1 %
HGB BLD-MCNC: 10 G/DL
HGB BLD-MCNC: 10.5 G/DL
HGB BLD-MCNC: 11.6 G/DL
HGB BLD-MCNC: 13.3 G/DL
IMM GRANULOCYTES # BLD AUTO: 0.04 X10(3) UL (ref 0–1)
IMM GRANULOCYTES # BLD AUTO: 0.06 X10(3) UL (ref 0–1)
IMM GRANULOCYTES NFR BLD: 0.3 %
IMM GRANULOCYTES NFR BLD: 0.4 %
KETONES UR STRIP.AUTO-MCNC: NEGATIVE MG/DL
LACTATE SERPL-SCNC: 2 MMOL/L (ref 0.4–2)
LACTATE SERPL-SCNC: 2.4 MMOL/L (ref 0.4–2)
LEUKOCYTE ESTERASE UR QL STRIP.AUTO: 500
LYMPHOCYTES # BLD AUTO: 0.56 X10(3) UL (ref 1–4)
LYMPHOCYTES # BLD AUTO: 0.66 X10(3) UL (ref 1–4)
LYMPHOCYTES NFR BLD AUTO: 3.3 %
LYMPHOCYTES NFR BLD AUTO: 4.7 %
MAGNESIUM SERPL-MCNC: 2.1 MG/DL (ref 1.6–2.6)
MAGNESIUM SERPL-MCNC: 2.6 MG/DL (ref 1.6–2.6)
MCH RBC QN AUTO: 31.1 PG (ref 26–34)
MCH RBC QN AUTO: 31.5 PG (ref 26–34)
MCH RBC QN AUTO: 31.8 PG (ref 26–34)
MCHC RBC AUTO-ENTMCNC: 31.6 G/DL (ref 31–37)
MCHC RBC AUTO-ENTMCNC: 32.2 G/DL (ref 31–37)
MCHC RBC AUTO-ENTMCNC: 32.4 G/DL (ref 31–37)
MCV RBC AUTO: 97.8 FL
MCV RBC AUTO: 98.2 FL
MCV RBC AUTO: 98.6 FL
METHGB MFR BLD: 0.1 % SAT (ref 0.4–1.5)
MONOCYTES # BLD AUTO: 0.72 X10(3) UL (ref 0.1–1)
MONOCYTES # BLD AUTO: 0.96 X10(3) UL (ref 0.1–1)
MONOCYTES NFR BLD AUTO: 5.1 %
MONOCYTES NFR BLD AUTO: 5.7 %
NEUTROPHILS # BLD AUTO: 12.75 X10 (3) UL (ref 1.5–7.7)
NEUTROPHILS # BLD AUTO: 12.75 X10(3) UL (ref 1.5–7.7)
NEUTROPHILS # BLD AUTO: 15.21 X10 (3) UL (ref 1.5–7.7)
NEUTROPHILS # BLD AUTO: 15.21 X10(3) UL (ref 1.5–7.7)
NEUTROPHILS NFR BLD AUTO: 89.8 %
NEUTROPHILS NFR BLD AUTO: 90.5 %
NITRITE UR QL STRIP.AUTO: NEGATIVE
OSMOLALITY SERPL CALC.SUM OF ELEC: 310 MOSM/KG (ref 275–295)
OSMOLALITY SERPL CALC.SUM OF ELEC: 315 MOSM/KG (ref 275–295)
OSMOLALITY SERPL CALC.SUM OF ELEC: 329 MOSM/KG (ref 275–295)
OSMOLALITY SERPL CALC.SUM OF ELEC: 333 MOSM/KG (ref 275–295)
OXYHGB MFR BLDA: 95.6 % (ref 92–100)
P AXIS: 94 DEGREES
P-R INTERVAL: 172 MS
P/F RATIO: 352 MMHG
PCO2 BLDA: 30 MM HG (ref 35–45)
PH BLDA: 7.4 [PH] (ref 7.35–7.45)
PH UR STRIP.AUTO: 5.5 [PH] (ref 5–8)
PHOSPHATE SERPL-MCNC: 0.9 MG/DL (ref 2.5–4.9)
PLATELET # BLD AUTO: 191 10(3)UL (ref 150–450)
PLATELET # BLD AUTO: 276 10(3)UL (ref 150–450)
PLATELET # BLD AUTO: 365 10(3)UL (ref 150–450)
PO2 BLDA: 74 MM HG (ref 80–100)
POTASSIUM SERPL-SCNC: 2.9 MMOL/L (ref 3.5–5.1)
POTASSIUM SERPL-SCNC: 3.7 MMOL/L (ref 3.5–5.1)
POTASSIUM SERPL-SCNC: 3.8 MMOL/L (ref 3.5–5.1)
POTASSIUM SERPL-SCNC: 5 MMOL/L (ref 3.5–5.1)
POTASSIUM SERPL-SCNC: 5 MMOL/L (ref 3.5–5.1)
PROT SERPL-MCNC: 8.1 G/DL (ref 6.4–8.2)
PROT UR STRIP.AUTO-MCNC: 20 MG/DL
Q-T INTERVAL: 404 MS
QRS DURATION: 82 MS
QTC CALCULATION (BEZET): 505 MS
R AXIS: -11 DEGREES
RBC # BLD AUTO: 3.3 X10(6)UL
RBC # BLD AUTO: 3.68 X10(6)UL
RBC # BLD AUTO: 4.27 X10(6)UL
RSV RNA SPEC NAA+PROBE: NEGATIVE
SARS-COV-2 RNA RESP QL NAA+PROBE: NOT DETECTED
SODIUM SERPL-SCNC: 144 MMOL/L (ref 136–145)
SODIUM SERPL-SCNC: 147 MMOL/L (ref 136–145)
SODIUM SERPL-SCNC: 147 MMOL/L (ref 136–145)
SODIUM SERPL-SCNC: 149 MMOL/L (ref 136–145)
SODIUM SERPL-SCNC: 67 MMOL/L
SP GR UR STRIP.AUTO: 1.01 (ref 1–1.03)
T AXIS: 100 DEGREES
TSI SER-ACNC: 0.43 MIU/ML (ref 0.36–3.74)
UROBILINOGEN UR STRIP.AUTO-MCNC: NORMAL MG/DL
UUN UR-MCNC: 561 MG/DL
VENTRICULAR RATE: 94 BPM
WBC # BLD AUTO: 14.2 X10(3) UL (ref 4–11)
WBC # BLD AUTO: 14.5 X10(3) UL (ref 4–11)
WBC # BLD AUTO: 16.8 X10(3) UL (ref 4–11)

## 2023-01-01 PROCEDURE — 99223 1ST HOSP IP/OBS HIGH 75: CPT | Performed by: NURSE PRACTITIONER

## 2023-01-01 PROCEDURE — 71045 X-RAY EXAM CHEST 1 VIEW: CPT | Performed by: EMERGENCY MEDICINE

## 2023-01-01 PROCEDURE — 76770 US EXAM ABDO BACK WALL COMP: CPT | Performed by: INTERNAL MEDICINE

## 2023-01-01 PROCEDURE — 70450 CT HEAD/BRAIN W/O DYE: CPT | Performed by: HOSPITALIST

## 2023-01-01 RX ORDER — LORAZEPAM 2 MG/ML
1 INJECTION INTRAMUSCULAR EVERY 4 HOURS PRN
Status: DISCONTINUED | OUTPATIENT
Start: 2023-01-01 | End: 2023-01-01

## 2023-01-01 RX ORDER — ACETAMINOPHEN 325 MG/1
650 TABLET ORAL EVERY 6 HOURS PRN
Status: DISCONTINUED | OUTPATIENT
Start: 2023-01-01 | End: 2023-01-01

## 2023-01-01 RX ORDER — ONDANSETRON 2 MG/ML
4 INJECTION INTRAMUSCULAR; INTRAVENOUS EVERY 6 HOURS PRN
Status: DISCONTINUED | OUTPATIENT
Start: 2023-01-01 | End: 2023-01-01

## 2023-01-01 RX ORDER — LORAZEPAM 2 MG/ML
0.5 INJECTION INTRAMUSCULAR EVERY 4 HOURS PRN
Status: DISCONTINUED | OUTPATIENT
Start: 2023-01-01 | End: 2023-01-01

## 2023-01-01 RX ORDER — HALOPERIDOL 5 MG/ML
1 INJECTION INTRAMUSCULAR
Status: DISCONTINUED | OUTPATIENT
Start: 2023-01-01 | End: 2023-01-01

## 2023-01-01 RX ORDER — ATROPINE SULFATE 10 MG/ML
2 SOLUTION/ DROPS OPHTHALMIC EVERY 2 HOUR PRN
Status: DISCONTINUED | OUTPATIENT
Start: 2023-01-01 | End: 2023-01-01

## 2023-01-01 RX ORDER — LORAZEPAM 2 MG/ML
2 INJECTION INTRAMUSCULAR EVERY 4 HOURS PRN
Status: DISCONTINUED | OUTPATIENT
Start: 2023-01-01 | End: 2023-01-01

## 2023-01-01 RX ORDER — HALOPERIDOL 5 MG/ML
2 INJECTION INTRAMUSCULAR
Status: DISCONTINUED | OUTPATIENT
Start: 2023-01-01 | End: 2023-01-01

## 2023-01-01 RX ORDER — SCOLOPAMINE TRANSDERMAL SYSTEM 1 MG/1
1 PATCH, EXTENDED RELEASE TRANSDERMAL
Status: DISCONTINUED | OUTPATIENT
Start: 2023-01-01 | End: 2023-01-01

## 2023-01-01 RX ORDER — MORPHINE SULFATE 2 MG/ML
1 INJECTION, SOLUTION INTRAMUSCULAR; INTRAVENOUS
Status: DISCONTINUED | OUTPATIENT
Start: 2023-01-01 | End: 2023-01-01

## 2023-01-01 RX ORDER — PANTOPRAZOLE SODIUM 40 MG/1
40 TABLET, DELAYED RELEASE ORAL
Status: DISCONTINUED | OUTPATIENT
Start: 2023-01-01 | End: 2023-01-01

## 2023-01-01 RX ORDER — SODIUM CHLORIDE 9 MG/ML
1000 INJECTION, SOLUTION INTRAVENOUS ONCE
Status: DISCONTINUED | OUTPATIENT
Start: 2023-01-01 | End: 2023-01-01

## 2023-01-01 RX ORDER — DEXTROSE AND SODIUM CHLORIDE 5; .45 G/100ML; G/100ML
INJECTION, SOLUTION INTRAVENOUS ONCE
Status: COMPLETED | OUTPATIENT
Start: 2023-01-01 | End: 2023-01-01

## 2023-01-01 RX ORDER — HEPARIN SODIUM 5000 [USP'U]/ML
5000 INJECTION, SOLUTION INTRAVENOUS; SUBCUTANEOUS EVERY 8 HOURS SCHEDULED
Status: DISCONTINUED | OUTPATIENT
Start: 2023-01-01 | End: 2023-01-01

## 2023-01-01 RX ORDER — ACETAMINOPHEN 650 MG/1
650 SUPPOSITORY RECTAL ONCE
Status: COMPLETED | OUTPATIENT
Start: 2023-01-01 | End: 2023-01-01

## 2023-01-01 RX ORDER — DEXTROSE MONOHYDRATE 50 MG/ML
INJECTION, SOLUTION INTRAVENOUS CONTINUOUS
Status: DISCONTINUED | OUTPATIENT
Start: 2023-01-01 | End: 2023-01-01

## 2023-01-01 RX ORDER — FLUTICASONE PROPIONATE 50 MCG
2 SPRAY, SUSPENSION (ML) NASAL DAILY
Status: DISCONTINUED | OUTPATIENT
Start: 2023-01-01 | End: 2023-01-01

## 2023-01-01 RX ORDER — BISACODYL 10 MG
10 SUPPOSITORY, RECTAL RECTAL
Status: DISCONTINUED | OUTPATIENT
Start: 2023-01-01 | End: 2023-01-01

## 2023-01-01 RX ORDER — FUROSEMIDE 10 MG/ML
40 INJECTION INTRAMUSCULAR; INTRAVENOUS EVERY 8 HOURS PRN
Status: DISCONTINUED | OUTPATIENT
Start: 2023-01-01 | End: 2023-01-01

## 2023-01-01 RX ORDER — POTASSIUM CHLORIDE 14.9 MG/ML
20 INJECTION INTRAVENOUS ONCE
Status: COMPLETED | OUTPATIENT
Start: 2023-01-01 | End: 2023-01-01

## 2023-01-01 RX ORDER — METOPROLOL TARTRATE 5 MG/5ML
5 INJECTION INTRAVENOUS EVERY 6 HOURS
Status: DISCONTINUED | OUTPATIENT
Start: 2023-01-01 | End: 2023-01-01

## 2023-01-01 RX ORDER — ACETAMINOPHEN 650 MG/1
650 SUPPOSITORY RECTAL EVERY 4 HOURS PRN
Status: DISCONTINUED | OUTPATIENT
Start: 2023-01-01 | End: 2023-01-01

## 2023-01-01 RX ORDER — SODIUM CHLORIDE 0.9 % (FLUSH) 0.9 %
10 SYRINGE (ML) INJECTION AS NEEDED
Status: DISCONTINUED | OUTPATIENT
Start: 2023-01-01 | End: 2023-01-01

## 2023-01-01 RX ORDER — DEXTROSE AND SODIUM CHLORIDE 5; .45 G/100ML; G/100ML
INJECTION, SOLUTION INTRAVENOUS CONTINUOUS
Status: DISCONTINUED | OUTPATIENT
Start: 2023-01-01 | End: 2023-01-01

## 2023-01-01 RX ORDER — GLYCOPYRROLATE 0.2 MG/ML
0.4 INJECTION, SOLUTION INTRAMUSCULAR; INTRAVENOUS
Status: DISCONTINUED | OUTPATIENT
Start: 2023-01-01 | End: 2023-01-01

## 2023-01-01 RX ORDER — DEXTROSE MONOHYDRATE 50 MG/ML
INJECTION, SOLUTION INTRAVENOUS CONTINUOUS
Status: CANCELLED | OUTPATIENT
Start: 2023-01-01

## 2023-07-17 ENCOUNTER — HOSPITAL ENCOUNTER (INPATIENT)
Facility: HOSPITAL | Age: 88
LOS: 8 days | Discharge: HOSPICE | DRG: 689 | End: 2023-07-26
Attending: EMERGENCY MEDICINE | Admitting: INTERNAL MEDICINE
Payer: MEDICARE

## 2023-07-17 ENCOUNTER — HOSPITAL ENCOUNTER (INPATIENT)
Facility: HOSPITAL | Age: 88
End: 2023-07-17
Attending: EMERGENCY MEDICINE | Admitting: INTERNAL MEDICINE
Payer: MEDICARE

## 2023-07-17 ENCOUNTER — APPOINTMENT (OUTPATIENT)
Dept: GENERAL RADIOLOGY | Facility: HOSPITAL | Age: 88
DRG: 689 | End: 2023-07-17
Attending: EMERGENCY MEDICINE
Payer: MEDICARE

## 2023-07-17 ENCOUNTER — APPOINTMENT (OUTPATIENT)
Dept: CT IMAGING | Facility: HOSPITAL | Age: 88
End: 2023-07-17
Attending: EMERGENCY MEDICINE
Payer: MEDICARE

## 2023-07-17 ENCOUNTER — APPOINTMENT (OUTPATIENT)
Dept: GENERAL RADIOLOGY | Facility: HOSPITAL | Age: 88
End: 2023-07-17
Attending: EMERGENCY MEDICINE
Payer: MEDICARE

## 2023-07-17 ENCOUNTER — APPOINTMENT (OUTPATIENT)
Dept: CT IMAGING | Facility: HOSPITAL | Age: 88
DRG: 689 | End: 2023-07-17
Attending: EMERGENCY MEDICINE
Payer: MEDICARE

## 2023-07-17 DIAGNOSIS — E86.0 DEHYDRATION: ICD-10-CM

## 2023-07-17 DIAGNOSIS — R53.1 WEAKNESS GENERALIZED: ICD-10-CM

## 2023-07-17 DIAGNOSIS — N39.0 URINARY TRACT INFECTION WITHOUT HEMATURIA, SITE UNSPECIFIED: Primary | ICD-10-CM

## 2023-07-17 LAB
ALBUMIN SERPL-MCNC: 3.3 G/DL (ref 3.4–5)
ALBUMIN/GLOB SERPL: 0.7 {RATIO} (ref 1–2)
ALP LIVER SERPL-CCNC: 105 U/L
ALT SERPL-CCNC: 27 U/L
ANION GAP SERPL CALC-SCNC: 7 MMOL/L (ref 0–18)
AST SERPL-CCNC: 27 U/L (ref 15–37)
BASOPHILS # BLD AUTO: 0.05 X10(3) UL (ref 0–0.2)
BASOPHILS NFR BLD AUTO: 0.7 %
BILIRUB SERPL-MCNC: 0.4 MG/DL (ref 0.1–2)
BILIRUB UR QL STRIP.AUTO: NEGATIVE
BUN BLD-MCNC: 21 MG/DL (ref 7–18)
C DIFF TOX B STL QL: NEGATIVE
CALCIUM BLD-MCNC: 10 MG/DL (ref 8.5–10.1)
CHLORIDE SERPL-SCNC: 107 MMOL/L (ref 98–112)
CO2 SERPL-SCNC: 25 MMOL/L (ref 21–32)
COLOR UR AUTO: YELLOW
CREAT BLD-MCNC: 1.14 MG/DL
EOSINOPHIL # BLD AUTO: 0.07 X10(3) UL (ref 0–0.7)
EOSINOPHIL NFR BLD AUTO: 1 %
ERYTHROCYTE [DISTWIDTH] IN BLOOD BY AUTOMATED COUNT: 13.8 %
GFR SERPLBLD BASED ON 1.73 SQ M-ARVRAT: 44 ML/MIN/1.73M2 (ref 60–?)
GLOBULIN PLAS-MCNC: 4.9 G/DL (ref 2.8–4.4)
GLUCOSE BLD-MCNC: 118 MG/DL (ref 70–99)
GLUCOSE UR STRIP.AUTO-MCNC: NEGATIVE MG/DL
HCT VFR BLD AUTO: 40.5 %
HGB BLD-MCNC: 13.5 G/DL
IMM GRANULOCYTES # BLD AUTO: 0.1 X10(3) UL (ref 0–1)
IMM GRANULOCYTES NFR BLD: 1.5 %
KETONES UR STRIP.AUTO-MCNC: NEGATIVE MG/DL
LEUKOCYTE ESTERASE UR QL STRIP.AUTO: NEGATIVE
LIPASE SERPL-CCNC: 26 U/L (ref 13–75)
LYMPHOCYTES # BLD AUTO: 0.86 X10(3) UL (ref 1–4)
LYMPHOCYTES NFR BLD AUTO: 12.7 %
MCH RBC QN AUTO: 29.5 PG (ref 26–34)
MCHC RBC AUTO-ENTMCNC: 33.3 G/DL (ref 31–37)
MCV RBC AUTO: 88.6 FL
MONOCYTES # BLD AUTO: 0.34 X10(3) UL (ref 0.1–1)
MONOCYTES NFR BLD AUTO: 5 %
NEUTROPHILS # BLD AUTO: 5.37 X10 (3) UL (ref 1.5–7.7)
NEUTROPHILS # BLD AUTO: 5.37 X10(3) UL (ref 1.5–7.7)
NEUTROPHILS NFR BLD AUTO: 79.1 %
NITRITE UR QL STRIP.AUTO: POSITIVE
OSMOLALITY SERPL CALC.SUM OF ELEC: 292 MOSM/KG (ref 275–295)
PH UR STRIP.AUTO: 8 [PH] (ref 5–8)
PLATELET # BLD AUTO: 238 10(3)UL (ref 150–450)
POTASSIUM SERPL-SCNC: 4.3 MMOL/L (ref 3.5–5.1)
PROT SERPL-MCNC: 8.2 G/DL (ref 6.4–8.2)
PROT UR STRIP.AUTO-MCNC: NEGATIVE MG/DL
RBC # BLD AUTO: 4.57 X10(6)UL
SODIUM SERPL-SCNC: 139 MMOL/L (ref 136–145)
SP GR UR STRIP.AUTO: 1.02 (ref 1–1.03)
UROBILINOGEN UR STRIP.AUTO-MCNC: <2 MG/DL
WBC # BLD AUTO: 6.8 X10(3) UL (ref 4–11)

## 2023-07-17 PROCEDURE — 93005 ELECTROCARDIOGRAM TRACING: CPT

## 2023-07-17 PROCEDURE — 99285 EMERGENCY DEPT VISIT HI MDM: CPT

## 2023-07-17 PROCEDURE — 96361 HYDRATE IV INFUSION ADD-ON: CPT

## 2023-07-17 PROCEDURE — 74177 CT ABD & PELVIS W/CONTRAST: CPT | Performed by: EMERGENCY MEDICINE

## 2023-07-17 PROCEDURE — 70450 CT HEAD/BRAIN W/O DYE: CPT | Performed by: EMERGENCY MEDICINE

## 2023-07-17 PROCEDURE — 87493 C DIFF AMPLIFIED PROBE: CPT | Performed by: EMERGENCY MEDICINE

## 2023-07-17 PROCEDURE — 87077 CULTURE AEROBIC IDENTIFY: CPT | Performed by: EMERGENCY MEDICINE

## 2023-07-17 PROCEDURE — 87045 FECES CULTURE AEROBIC BACT: CPT | Performed by: EMERGENCY MEDICINE

## 2023-07-17 PROCEDURE — 81001 URINALYSIS AUTO W/SCOPE: CPT | Performed by: EMERGENCY MEDICINE

## 2023-07-17 PROCEDURE — 87507 IADNA-DNA/RNA PROBE TQ 12-25: CPT | Performed by: HOSPITALIST

## 2023-07-17 PROCEDURE — 82272 OCCULT BLD FECES 1-3 TESTS: CPT | Performed by: EMERGENCY MEDICINE

## 2023-07-17 PROCEDURE — 87186 SC STD MICRODIL/AGAR DIL: CPT | Performed by: EMERGENCY MEDICINE

## 2023-07-17 PROCEDURE — 71045 X-RAY EXAM CHEST 1 VIEW: CPT | Performed by: EMERGENCY MEDICINE

## 2023-07-17 PROCEDURE — 93010 ELECTROCARDIOGRAM REPORT: CPT

## 2023-07-17 PROCEDURE — 87427 SHIGA-LIKE TOXIN AG IA: CPT | Performed by: EMERGENCY MEDICINE

## 2023-07-17 PROCEDURE — 87046 STOOL CULTR AEROBIC BACT EA: CPT | Performed by: EMERGENCY MEDICINE

## 2023-07-17 PROCEDURE — 85025 COMPLETE CBC W/AUTO DIFF WBC: CPT | Performed by: EMERGENCY MEDICINE

## 2023-07-17 PROCEDURE — 87086 URINE CULTURE/COLONY COUNT: CPT | Performed by: EMERGENCY MEDICINE

## 2023-07-17 PROCEDURE — 96365 THER/PROPH/DIAG IV INF INIT: CPT

## 2023-07-17 PROCEDURE — 80053 COMPREHEN METABOLIC PANEL: CPT | Performed by: EMERGENCY MEDICINE

## 2023-07-17 PROCEDURE — 83690 ASSAY OF LIPASE: CPT | Performed by: EMERGENCY MEDICINE

## 2023-07-17 RX ORDER — SODIUM CHLORIDE 9 MG/ML
125 INJECTION, SOLUTION INTRAVENOUS CONTINUOUS
Status: DISCONTINUED | OUTPATIENT
Start: 2023-07-17 | End: 2023-07-18

## 2023-07-17 RX ORDER — SODIUM CHLORIDE 9 MG/ML
INJECTION, SOLUTION INTRAVENOUS CONTINUOUS
Status: DISCONTINUED | OUTPATIENT
Start: 2023-07-17 | End: 2023-07-18

## 2023-07-17 NOTE — ED INITIAL ASSESSMENT (HPI)
Patient presents to the ED via EMS for vomiting. Patient arrives with her daughter who states that she was changing patient and turning when patient started vomiting. Denies fevers. Daughter states that patient has not been eating or drinking normally over the last few days. Patient takes honey thick liquids and pureed diet. Hx of dementia, A&Ox1.

## 2023-07-17 NOTE — CM/SW NOTE
CM assistance requested to place patient in a skilled nursing facility for hydration. Pt is 24/7 care by her daughters. Pt is on honey thickened liquids and daughters provide all care. Per Dr. Nilda Ferris patient needs a skilled facility for IV hydration. CM at bedside and discussed the above with pt daughters. Pt daughters state they are fine with the patient going to a SNF but one of them needs to be with the patient 24/7 because pt is bed ridden, doesn't talk and they know her needs based on body language.

## 2023-07-18 PROBLEM — R53.1 WEAKNESS GENERALIZED: Status: ACTIVE | Noted: 2023-01-01

## 2023-07-18 PROBLEM — R53.1 WEAKNESS GENERALIZED: Status: ACTIVE | Noted: 2023-07-18

## 2023-07-18 LAB
ANION GAP SERPL CALC-SCNC: 13 MMOL/L (ref 0–18)
BUN BLD-MCNC: 20 MG/DL (ref 7–18)
CALCIUM BLD-MCNC: 8.5 MG/DL (ref 8.5–10.1)
CHLORIDE SERPL-SCNC: 112 MMOL/L (ref 98–112)
CO2 SERPL-SCNC: 18 MMOL/L (ref 21–32)
CREAT BLD-MCNC: 0.94 MG/DL
GFR SERPLBLD BASED ON 1.73 SQ M-ARVRAT: 56 ML/MIN/1.73M2 (ref 60–?)
GLUCOSE BLD-MCNC: 139 MG/DL (ref 70–99)
OSMOLALITY SERPL CALC.SUM OF ELEC: 301 MOSM/KG (ref 275–295)
POTASSIUM SERPL-SCNC: 3.8 MMOL/L (ref 3.5–5.1)
SODIUM SERPL-SCNC: 143 MMOL/L (ref 136–145)

## 2023-07-18 PROCEDURE — 92610 EVALUATE SWALLOWING FUNCTION: CPT

## 2023-07-18 PROCEDURE — 80048 BASIC METABOLIC PNL TOTAL CA: CPT | Performed by: INTERNAL MEDICINE

## 2023-07-18 RX ORDER — VANCOMYCIN HYDROCHLORIDE 125 MG/1
125 CAPSULE ORAL 2 TIMES DAILY
Status: DISCONTINUED | OUTPATIENT
Start: 2023-07-18 | End: 2023-07-26

## 2023-07-18 RX ORDER — ONDANSETRON 2 MG/ML
4 INJECTION INTRAMUSCULAR; INTRAVENOUS EVERY 6 HOURS PRN
Status: DISCONTINUED | OUTPATIENT
Start: 2023-07-18 | End: 2023-07-26

## 2023-07-18 RX ORDER — HYDRALAZINE HYDROCHLORIDE 25 MG/1
25 TABLET, FILM COATED ORAL EVERY 8 HOURS
Status: DISCONTINUED | OUTPATIENT
Start: 2023-07-18 | End: 2023-07-21

## 2023-07-18 RX ORDER — FAMOTIDINE 40 MG/5ML
POWDER, FOR SUSPENSION ORAL
COMMUNITY
Start: 2023-05-16

## 2023-07-18 RX ORDER — PANTOPRAZOLE SODIUM 40 MG/1
40 TABLET, DELAYED RELEASE ORAL
Status: DISCONTINUED | OUTPATIENT
Start: 2023-07-18 | End: 2023-07-24

## 2023-07-18 RX ORDER — HEPARIN SODIUM 5000 [USP'U]/ML
5000 INJECTION, SOLUTION INTRAVENOUS; SUBCUTANEOUS EVERY 8 HOURS SCHEDULED
Status: DISCONTINUED | OUTPATIENT
Start: 2023-07-18 | End: 2023-07-26

## 2023-07-18 RX ORDER — FLUTICASONE PROPIONATE 50 MCG
2 SPRAY, SUSPENSION (ML) NASAL DAILY
Status: DISCONTINUED | OUTPATIENT
Start: 2023-07-18 | End: 2023-07-26

## 2023-07-18 RX ORDER — SODIUM CHLORIDE 9 MG/ML
INJECTION, SOLUTION INTRAVENOUS CONTINUOUS
Status: DISCONTINUED | OUTPATIENT
Start: 2023-07-18 | End: 2023-07-18

## 2023-07-18 RX ORDER — FAMOTIDINE 20 MG/1
20 TABLET, FILM COATED ORAL EVERY OTHER DAY
Status: ON HOLD | COMMUNITY
Start: 2023-02-14 | End: 2023-07-18

## 2023-07-18 RX ORDER — ACETAMINOPHEN 500 MG
500 TABLET ORAL EVERY 4 HOURS PRN
Status: DISCONTINUED | OUTPATIENT
Start: 2023-07-18 | End: 2023-07-26

## 2023-07-18 RX ORDER — METOCLOPRAMIDE HYDROCHLORIDE 5 MG/ML
5 INJECTION INTRAMUSCULAR; INTRAVENOUS EVERY 8 HOURS PRN
Status: DISCONTINUED | OUTPATIENT
Start: 2023-07-18 | End: 2023-07-26

## 2023-07-18 RX ORDER — DEXTROSE MONOHYDRATE, SODIUM CHLORIDE, AND POTASSIUM CHLORIDE 50; .745; 4.5 G/1000ML; G/1000ML; G/1000ML
INJECTION, SOLUTION INTRAVENOUS CONTINUOUS
Status: DISCONTINUED | OUTPATIENT
Start: 2023-07-18 | End: 2023-07-19

## 2023-07-18 NOTE — PLAN OF CARE
7/18 AM: Pt is A/O x 1, alert to self and family. Lung sounds are diminished, on room air. BP and HR are WNL, NSR on tele. BM today, reporting diarrhea, C Diff negative, incontinent, briefed. Bed bound is baseline, daughter is the care taker. Speech evaluated patient, pureed/honey thick liquids. KCL in 0.45 NS going at 75 mL/hr. 1 episode of emesis reported from patient, will pre medicate with zofran with meals later this evening.

## 2023-07-18 NOTE — PHYSICAL THERAPY NOTE
PT order was received and Pt's chart was reviewed. Per EMR, at baseline pt is bed bound with 24/7 hr assist from daughter. No skilled PT at this time and will sign off.

## 2023-07-18 NOTE — PROGRESS NOTES
NURSING ADMISSION NOTE      Patient admitted via Cart  Oriented to room. Safety precautions initiated. Bed in low position. Call light in reach. New admission, pt from home with her daughter she is her primary caregiver. Not able to assess LOC. Pt daughter states that she only speaks very little. Is incontinent of bowel and bladder. Is having loose stools. Cdiff was negative. Pt daughter requesting bunny boots for pt, she does not have any sores on heels but provided for pt because she wears these at home. Pt has trouble swallowing, has a honey thick liquid and pureed diet at home. Pt daughter staying with pt overnight. Call light within reach, frequent checks made, needs met.

## 2023-07-18 NOTE — ED QUICK NOTES
Orders for admission, patient is aware of plan and ready to go upstairs. Any questions, please call ED RN Herber Cedeño at extension [phone]. Patient Covid vaccination status: Fully vaccinated     COVID Test Ordered in ED: None    COVID Suspicion at Admission: N/A    Running Infusions:    sodium chloride Stopped (07/17/23 2119)    sodium chloride      None    Mental Status/LOC at time of transport: A&Ox0-1    Other pertinent information:  Waiting for Acute Rehab Placement.      CIWA score: N/A   NIH score:  N/A

## 2023-07-18 NOTE — OCCUPATIONAL THERAPY NOTE
OT order received and chart reviewed. Pt is bed bound and uses a total lift at home for mobility. Pt has assist with all ADL. No skilled IPOT needs at this time - will sign off. RN aware.      Thank you for allowing me to care for this patient,   Anna Parish, OTR/L   Occupational Therapist   [facility]   Pager: [phone]

## 2023-07-18 NOTE — CM/SW NOTE
1900:  Received hand off report from [address] [place] [address] that Dr. Jena Davis would like for patient to go to SNF for IV Hydration as patient is dehydrated. Per [facility] patient has Medicaid so [facility] referral placed for the above.     1922:  No accepting SNF's in [place]. 2102:  [facility] informed Dr. Jena Davis no accepting SNF's in [place] as of yet. And likely will not have any accepting agency tonight, offered to call ER Charge RN to see if they are agreeable to stay in ER overnight until we can secure a Medicaid bed which may or may not happen tomorrow - Per Dr. Jena Davis patient has UTI also - informed Dr. Jena Davis due to pt's UTI and dehydration patient is no longer candidate for SNF placement and meets INPT criteria - Dr. Jena Davis agreeable to the above and will admit patient INPT. [facility] SNF referral cancelled.

## 2023-07-18 NOTE — SLP NOTE
ADULT SWALLOWING EVALUATION    ASSESSMENT    ASSESSMENT/OVERALL IMPRESSION:  Patient is a 81 y/o female admitted with nausea/vomiting with a PMHx significant for dementia. Patient known to this service for a VFSS completed 9/26/22 which recommended patient be NPO. However, patient's daughter elected to accept risk of aspiration and feed her mother and refused PEG tube. Despite aspiration risk exhibited by patient during past VFSS, her CXR on admission yesterday 7/17/23 did not reveal any acute pulmonary abnormality. Patient received drowsy, but arousable in bed with daughter at bedside. Patient's daughter reported patient consumes a puree diet and moderately thick liquids at baseline. She reported that she appears to be tolerating her diet. Patient nonverbal and unable to provide history. Patient presenting with oropharyngeal dysphagia. PO trials of moderately thick liquid and puree were administered. Patient accepted small amounts of moderately thick liquid and puree via spoon. No anterior spillage observed. AP transit was judged to be prolonged. Patient initiated multiple swallows with each trial. Oral residue was unable to be assessed d/t patient's inability follow commands. Hyolaryngeal excursion was judged to be of reduced strength and timeliness per palpation. No overt s/s of aspiration observed. Education was provided to patient's daughter regarding aspiration risk with PO feeding. She verbalized understanding of the potential consequences and reported that she wishes to continue feeding her a puree diet and moderately thick liquids. No further SLP services warranted at this time as patient appears at baseline oropharyngeal swallow. Education provided re: results and recommendations.           RECOMMENDATIONS   Diet Recommendations - Solids: Puree  Diet Recommendations - Liquids: Honey thick liquids/ Moderately thick                        Compensatory Strategies Recommended: Small bites and sips;Liquids via spoon  Aspiration Precautions: Slow rate;Upright position  Medication Administration Recommendations: Crushed in puree  Treatment Plan/Recommendations: No further inpatient SLP service warranted       HISTORY   MEDICAL HISTORY  Reason for Referral: RN dysphagia screen    Problem List  Principal Problem:    Urinary tract infection without hematuria, site unspecified  Active Problems:    Dehydration    Weakness generalized      Past Medical History  Past Medical History:   Diagnosis Date    Cervical arthritis 2014    Relates to neck and shoulder.-    Constipation     Esophageal reflux     High blood pressure     High cholesterol     Hypercholesterolemia     Hypertension     Language barrier     Daughters translate    Osteoarthrosis, unspecified whether generalized or localized, unspecified site     Osteoporosis     2014 DEXA    Reflux     Shoulder pain 6/2/2014    T12 compression fracture ([address] 3/21/2013    Daughter reported finding after fall and head injury    Urinary frequency     Had urology evaluation       Prior Living Situation: Home with support  Diet Prior to Admission: Honey thick liquids/ Moderately thick;Puree       Patient/Family Goals: none stated     SWALLOWING HISTORY  Current Diet Consistency: NPO  Dysphagia History: as above  Imaging Results:   CT Brain 7/17/23  CONCLUSION:    1. No acute intracranial hemorrhage or hydrocephalus. 2. Mild chronic small vessel ischemic disease. 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 If there is clinical concern for acute ischemia/infarction, an MRI of the brain would be recommended for further evaluation. LOCATION:  [id number]         Dictated by (CST): Adrián Monet MD on 7/17/2023 at 5:04 PM       Finalized by (CST): Adrián Monet MD on 7/17/2023 at 5:07 PM       CXR 7/17/23  CONCLUSION:  No acute cardiopulmonary process.          LOCATION:  [id number]                  Dictated by (CST): dArián Monet MD on 7/17/2023 at 3:26 PM       Finalized by (CST): Jose M Shepard MD on 7/17/2023 at 3:27 PM     SUBJECTIVE       OBJECTIVE   ORAL MOTOR EXAMINATION  Dentition: Edentulous  Symmetry: Unable to assess  Strength: Unable to assess     Range of Motion: Unable to assess  Rate of Motion: Unable to assess    Voice Quality:  (nonverbal)  Respiratory Status: Unlabored  Consistencies Trialed: Honey thick liquids;Puree  Method of Presentation: Staff/Clinician assistance  Patient Positioning: Midline;Upright    Oral Phase of Swallow: Impaired  Bolus Retrieval: Intact  Bilabial Seal: Intact  Bolus Formation: Impaired  Bolus Propulsion: Impaired          Pharyngeal Phase of Swallow: Impaired  Laryngeal Elevation Timing: Appears impaired  Laryngeal Elevation Strength: Appears impaired  Laryngeal Elevation Coordination: Appears intact  (Please note: Silent aspiration cannot be evaluated clinically. Videofluoroscopic Swallow Study is required to rule-out silent aspiration.)    Esophageal Phase of Swallow: No complaints consistent with possible esophageal involvement  Comments: d/w RN                FOLLOW UP  Treatment Plan/Recommendations: No further inpatient SLP service warranted     Follow Up Needed (Documentation Required): No       Thank you for your referral.   If you have any questions, please contact [address] [place] [address]    Patient seen in conjunction with graduate clinician. Agree with above report unless otherwise noted or adjusted.   Zita David M.A., [id number] [facility]  Pager: [phone]

## 2023-07-19 LAB
ADENOVIRUS F 40/41 PCR: NEGATIVE
ALBUMIN SERPL-MCNC: 2.1 G/DL (ref 3.4–5)
ALBUMIN/GLOB SERPL: 0.5 {RATIO} (ref 1–2)
ALP LIVER SERPL-CCNC: 80 U/L
ALT SERPL-CCNC: 15 U/L
ANION GAP SERPL CALC-SCNC: 9 MMOL/L (ref 0–18)
AST SERPL-CCNC: 17 U/L (ref 15–37)
ASTROVIRUS PCR: NEGATIVE
BASOPHILS # BLD AUTO: 0.02 X10(3) UL (ref 0–0.2)
BASOPHILS # BLD AUTO: 0.02 X10(3) UL (ref 0–0.2)
BASOPHILS NFR BLD AUTO: 0.2 %
BASOPHILS NFR BLD AUTO: 0.2 %
BILIRUB SERPL-MCNC: 0.2 MG/DL (ref 0.1–2)
BUN BLD-MCNC: 29 MG/DL (ref 7–18)
C CAYETANENSIS DNA SPEC QL NAA+PROBE: NEGATIVE
CALCIUM BLD-MCNC: 8.2 MG/DL (ref 8.5–10.1)
CAMPY SP DNA.DIARRHEA STL QL NAA+PROBE: NEGATIVE
CHLORIDE SERPL-SCNC: 116 MMOL/L (ref 98–112)
CO2 SERPL-SCNC: 15 MMOL/L (ref 21–32)
CREAT BLD-MCNC: 0.98 MG/DL
CRYPTOSP DNA SPEC QL NAA+PROBE: NEGATIVE
EAEC PAA PLAS AGGR+AATA ST NAA+NON-PRB: NEGATIVE
EC STX1+STX2 + H7 FLIC SPEC NAA+PROBE: NEGATIVE
ENTAMOEBA HISTOLYTICA PCR: NEGATIVE
EOSINOPHIL # BLD AUTO: 0.01 X10(3) UL (ref 0–0.7)
EOSINOPHIL # BLD AUTO: 0.01 X10(3) UL (ref 0–0.7)
EOSINOPHIL NFR BLD AUTO: 0.1 %
EOSINOPHIL NFR BLD AUTO: 0.1 %
EPEC EAE GENE STL QL NAA+NON-PROBE: NEGATIVE
ERYTHROCYTE [DISTWIDTH] IN BLOOD BY AUTOMATED COUNT: 14.1 %
ERYTHROCYTE [DISTWIDTH] IN BLOOD BY AUTOMATED COUNT: 14.4 %
ETEC LTA+ST1A+ST1B TOX ST NAA+NON-PROBE: NEGATIVE
GFR SERPLBLD BASED ON 1.73 SQ M-ARVRAT: 53 ML/MIN/1.73M2 (ref 60–?)
GIARDIA LAMBLIA PCR: NEGATIVE
GLOBULIN PLAS-MCNC: 4 G/DL (ref 2.8–4.4)
GLUCOSE BLD-MCNC: 173 MG/DL (ref 70–99)
HCT VFR BLD AUTO: 35.5 %
HCT VFR BLD AUTO: 36.6 %
HGB BLD-MCNC: 11.2 G/DL
HGB BLD-MCNC: 12 G/DL
IMM GRANULOCYTES # BLD AUTO: 0.04 X10(3) UL (ref 0–1)
IMM GRANULOCYTES # BLD AUTO: 0.05 X10(3) UL (ref 0–1)
IMM GRANULOCYTES NFR BLD: 0.4 %
IMM GRANULOCYTES NFR BLD: 0.5 %
LYMPHOCYTES # BLD AUTO: 0.82 X10(3) UL (ref 1–4)
LYMPHOCYTES # BLD AUTO: 0.82 X10(3) UL (ref 1–4)
LYMPHOCYTES NFR BLD AUTO: 7.5 %
LYMPHOCYTES NFR BLD AUTO: 8.3 %
MCH RBC QN AUTO: 29.4 PG (ref 26–34)
MCH RBC QN AUTO: 29.9 PG (ref 26–34)
MCHC RBC AUTO-ENTMCNC: 30.6 G/DL (ref 31–37)
MCHC RBC AUTO-ENTMCNC: 33.8 G/DL (ref 31–37)
MCV RBC AUTO: 88.3 FL
MCV RBC AUTO: 96.1 FL
MONOCYTES # BLD AUTO: 0.43 X10(3) UL (ref 0.1–1)
MONOCYTES # BLD AUTO: 0.55 X10(3) UL (ref 0.1–1)
MONOCYTES NFR BLD AUTO: 4.4 %
MONOCYTES NFR BLD AUTO: 5.1 %
NEUTROPHILS # BLD AUTO: 8.53 X10 (3) UL (ref 1.5–7.7)
NEUTROPHILS # BLD AUTO: 8.53 X10(3) UL (ref 1.5–7.7)
NEUTROPHILS # BLD AUTO: 9.43 X10 (3) UL (ref 1.5–7.7)
NEUTROPHILS # BLD AUTO: 9.43 X10(3) UL (ref 1.5–7.7)
NEUTROPHILS NFR BLD AUTO: 86.5 %
NEUTROPHILS NFR BLD AUTO: 86.7 %
NOROVIRUS GI/GII PCR: NEGATIVE
OSMOLALITY SERPL CALC.SUM OF ELEC: 300 MOSM/KG (ref 275–295)
P SHIGELLOIDES DNA STL QL NAA+PROBE: NEGATIVE
PLATELET # BLD AUTO: 204 10(3)UL (ref 150–450)
PLATELET # BLD AUTO: 209 10(3)UL (ref 150–450)
POTASSIUM SERPL-SCNC: 3.4 MMOL/L (ref 3.5–5.1)
PROT SERPL-MCNC: 6.1 G/DL (ref 6.4–8.2)
Q-T INTERVAL: 372 MS
QRS DURATION: 98 MS
QTC CALCULATION (BEZET): 477 MS
R AXIS: -8 DEGREES
RBC # BLD AUTO: 3.81 X10(6)UL
RBC # BLD AUTO: 4.02 X10(6)UL
ROTAVIRUS A PCR: NEGATIVE
SALMONELLA DNA SPEC QL NAA+PROBE: NEGATIVE
SAPOVIRUS PCR: NEGATIVE
SHIGELLA SP+EIEC IPAH ST NAA+NON-PROBE: NEGATIVE
SODIUM SERPL-SCNC: 140 MMOL/L (ref 136–145)
T AXIS: 88 DEGREES
V CHOLERAE DNA SPEC QL NAA+PROBE: NEGATIVE
VENTRICULAR RATE: 99 BPM
VIBRIO DNA SPEC NAA+PROBE: NEGATIVE
WBC # BLD AUTO: 10.9 X10(3) UL (ref 4–11)
WBC # BLD AUTO: 9.9 X10(3) UL (ref 4–11)
YERSINIA DNA SPEC NAA+PROBE: NEGATIVE

## 2023-07-19 PROCEDURE — 85025 COMPLETE CBC W/AUTO DIFF WBC: CPT | Performed by: INTERNAL MEDICINE

## 2023-07-19 PROCEDURE — 80053 COMPREHEN METABOLIC PANEL: CPT | Performed by: HOSPITALIST

## 2023-07-19 PROCEDURE — 85025 COMPLETE CBC W/AUTO DIFF WBC: CPT | Performed by: HOSPITALIST

## 2023-07-19 NOTE — PLAN OF CARE
7/19 AM: Pt is A/O x 1, alert to self and family. Lung sounds are diminished, on room air. BP and HR are WNL, NSR on tele. BM today, reporting diarrhea, C Diff negative, incontinent, briefed. Bed bound is baseline, daughter is the care taker. Speech evaluated patient, pureed/honey thick liquids. Fluids discontinued, new Abx started, ID and Urology consulted.     Problem: Patient/Family Goals  Goal: Patient/Family Long Term Goal  Description: Patient's Long Term Goal: Go home    Interventions:  - Abx  - See additional Care Plan goals for specific interventions  Outcome: Progressing  Goal: Patient/Family Short Term Goal  Description: Patient's Short Term Goal: Speech Eval  7/18 NOC: sleep well  7/19 AM: Eat  Interventions:   - Fluids  - See additional Care Plan goals for specific interventions  Outcome: Progressing

## 2023-07-19 NOTE — PLAN OF CARE
Pt A&Ox1, alert to self and family. Primarily [place] speaking, daughters always at bedside translating/helping with ADLs. Room air, . Tele, NSR/ST. Afebrile. IVF. IV abx. Po Vanco. Incontinent, briefed. Loose stools noted, cdiff/GI panel (-). No c/o of pain, sob, n/v. Bed bound at baseline. Pureed diet w/ thicken liquids, order set and 1:1 feed. Takes meds crushed in applesauce. Pt and pt's daughter updated on poc. Daughter at bedside overnight. No further needs at this time. Safety/fall/aspiration precautions in place. Call light within reach.        Problem: Patient/Family Goals  Goal: Patient/Family Long Term Goal  Description: Patient's Long Term Goal: Go home    Interventions:  - Abx  - See additional Care Plan goals for specific interventions  Outcome: Progressing  Goal: Patient/Family Short Term Goal  Description: Patient's Short Term Goal: Speech Eval  7/18 NOC: sleep well  Interventions:   - Fluids  - See additional Care Plan goals for specific interventions  Outcome: Progressing     Problem: PAIN - ADULT  Goal: Verbalizes/displays adequate comfort level or patient's stated pain goal  Description: INTERVENTIONS:  - Encourage pt to monitor pain and request assistance  - Assess pain using appropriate pain scale  - Administer analgesics based on type and severity of pain and evaluate response  - Implement non-pharmacological measures as appropriate and evaluate response  - Consider cultural and social influences on pain and pain management  - Manage/alleviate anxiety  - Utilize distraction and/or relaxation techniques  - Monitor for opioid side effects  - Notify MD/LIP if interventions unsuccessful or patient reports new pain  - Anticipate increased pain with activity and pre-medicate as appropriate  Outcome: Progressing     Problem: RISK FOR INFECTION - ADULT  Goal: Absence of fever/infection during anticipated neutropenic period  Description: INTERVENTIONS  - Monitor WBC  - Administer growth factors as ordered  - Implement neutropenic guidelines  Outcome: Progressing     Problem: SAFETY ADULT - FALL  Goal: Free from fall injury  Description: INTERVENTIONS:  - Assess pt frequently for physical needs  - Identify cognitive and physical deficits and behaviors that affect risk of falls.   - [place] fall precautions as indicated by assessment.  - Educate pt/family on patient safety including physical limitations  - Instruct pt to call for assistance with activity based on assessment  - Modify environment to reduce risk of injury  - Provide assistive devices as appropriate  - Consider OT/PT consult to assist with strengthening/mobility  - Encourage toileting schedule  Outcome: Progressing     Problem: DISCHARGE PLANNING  Goal: Discharge to home or other facility with appropriate resources  Description: INTERVENTIONS:  - Identify barriers to discharge w/pt and caregiver  - Include patient/family/discharge partner in discharge planning  - Arrange for needed discharge resources and transportation as appropriate  - Identify discharge learning needs (meds, wound care, etc)  - Arrange for interpreters to assist at discharge as needed  - Consider post-discharge preferences of patient/family/discharge partner  - Complete POLST form as appropriate  - Assess patient's ability to be responsible for managing their own health  - Refer to Case Management Department for coordinating discharge planning if the patient needs post-hospital services based on physician/LIP order or complex needs related to functional status, cognitive ability or social support system  Outcome: Progressing

## 2023-07-19 NOTE — PROGRESS NOTES
07/19/23 1985   Provider Notification   Reason for Communication Critical value  (positive UC)   Provider Name Corin Landrum MD   Method of Communication Page   Response Waiting for response   Notification Time 0142         Microbiology called with urine cultures positive for ESBL ecoli in urine.  Pt currently on IV rocephin and po vanco

## 2023-07-20 LAB
ANION GAP SERPL CALC-SCNC: 8 MMOL/L (ref 0–18)
BASOPHILS # BLD AUTO: 0.02 X10(3) UL (ref 0–0.2)
BASOPHILS NFR BLD AUTO: 0.2 %
BUN BLD-MCNC: 34 MG/DL (ref 7–18)
CALCIUM BLD-MCNC: 8.7 MG/DL (ref 8.5–10.1)
CHLORIDE SERPL-SCNC: 116 MMOL/L (ref 98–112)
CO2 SERPL-SCNC: 19 MMOL/L (ref 21–32)
CREAT BLD-MCNC: 0.76 MG/DL
EOSINOPHIL # BLD AUTO: 0.14 X10(3) UL (ref 0–0.7)
EOSINOPHIL NFR BLD AUTO: 1.7 %
ERYTHROCYTE [DISTWIDTH] IN BLOOD BY AUTOMATED COUNT: 14.6 %
GFR SERPLBLD BASED ON 1.73 SQ M-ARVRAT: 72 ML/MIN/1.73M2 (ref 60–?)
GLUCOSE BLD-MCNC: 139 MG/DL (ref 70–99)
HCT VFR BLD AUTO: 33.6 %
HGB BLD-MCNC: 10.8 G/DL
IMM GRANULOCYTES # BLD AUTO: 0.06 X10(3) UL (ref 0–1)
IMM GRANULOCYTES NFR BLD: 0.7 %
LYMPHOCYTES # BLD AUTO: 0.8 X10(3) UL (ref 1–4)
LYMPHOCYTES NFR BLD AUTO: 9.9 %
MCH RBC QN AUTO: 29.2 PG (ref 26–34)
MCHC RBC AUTO-ENTMCNC: 32.1 G/DL (ref 31–37)
MCV RBC AUTO: 90.8 FL
MONOCYTES # BLD AUTO: 0.52 X10(3) UL (ref 0.1–1)
MONOCYTES NFR BLD AUTO: 6.5 %
NEUTROPHILS # BLD AUTO: 6.51 X10 (3) UL (ref 1.5–7.7)
NEUTROPHILS # BLD AUTO: 6.51 X10(3) UL (ref 1.5–7.7)
NEUTROPHILS NFR BLD AUTO: 81 %
OSMOLALITY SERPL CALC.SUM OF ELEC: 306 MOSM/KG (ref 275–295)
PLATELET # BLD AUTO: 227 10(3)UL (ref 150–450)
POTASSIUM SERPL-SCNC: 3.2 MMOL/L (ref 3.5–5.1)
RBC # BLD AUTO: 3.7 X10(6)UL
SODIUM SERPL-SCNC: 143 MMOL/L (ref 136–145)
WBC # BLD AUTO: 8.1 X10(3) UL (ref 4–11)

## 2023-07-20 PROCEDURE — 85025 COMPLETE CBC W/AUTO DIFF WBC: CPT | Performed by: HOSPITALIST

## 2023-07-20 PROCEDURE — 80048 BASIC METABOLIC PNL TOTAL CA: CPT | Performed by: HOSPITALIST

## 2023-07-20 NOTE — PLAN OF CARE
Problem: Patient/Family Goals  Goal: Patient/Family Long Term Goal  Description: Patient's Long Term Goal: Go home    Interventions:  - Abx  - See additional Care Plan goals for specific interventions  Outcome: Completed  Goal: Patient/Family Short Term Goal  Description: Patient's Short Term Goal: Speech Eval  7/18 NOC: sleep well  7/19 AM: Eat  7/19 NOC: sleep well, eat more  Interventions:   - Fluids  - See additional Care Plan goals for specific interventions  Outcome: Completed     Problem: PAIN - ADULT  Goal: Verbalizes/displays adequate comfort level or patient's stated pain goal  Description: INTERVENTIONS:  - Encourage pt to monitor pain and request assistance  - Assess pain using appropriate pain scale  - Administer analgesics based on type and severity of pain and evaluate response  - Implement non-pharmacological measures as appropriate and evaluate response  - Consider cultural and social influences on pain and pain management  - Manage/alleviate anxiety  - Utilize distraction and/or relaxation techniques  - Monitor for opioid side effects  - Notify MD/LIP if interventions unsuccessful or patient reports new pain  - Anticipate increased pain with activity and pre-medicate as appropriate  Outcome: Completed     Problem: RISK FOR INFECTION - ADULT  Goal: Absence of fever/infection during anticipated neutropenic period  Description: INTERVENTIONS  - Monitor WBC  - Administer growth factors as ordered  - Implement neutropenic guidelines  Outcome: Completed     Problem: SAFETY ADULT - FALL  Goal: Free from fall injury  Description: INTERVENTIONS:  - Assess pt frequently for physical needs  - Identify cognitive and physical deficits and behaviors that affect risk of falls.   - [place] fall precautions as indicated by assessment.  - Educate pt/family on patient safety including physical limitations  - Instruct pt to call for assistance with activity based on assessment  - Modify environment to reduce risk of injury  - Provide assistive devices as appropriate  - Consider OT/PT consult to assist with strengthening/mobility  - Encourage toileting schedule  Outcome: Completed     Problem: DISCHARGE PLANNING  Goal: Discharge to home or other facility with appropriate resources  Description: INTERVENTIONS:  - Identify barriers to discharge w/pt and caregiver  - Include patient/family/discharge partner in discharge planning  - Arrange for needed discharge resources and transportation as appropriate  - Identify discharge learning needs (meds, wound care, etc)  - Arrange for interpreters to assist at discharge as needed  - Consider post-discharge preferences of patient/family/discharge partner  - Complete POLST form as appropriate  - Assess patient's ability to be responsible for managing their own health  - Refer to Case Management Department for coordinating discharge planning if the patient needs post-hospital services based on physician/LIP order or complex needs related to functional status, cognitive ability or social support system  Outcome: Completed

## 2023-07-20 NOTE — PROGRESS NOTES
Pt alert to self, nonverbal. RA. C/o cough, see mar. Tele, SR/ST. PO vanco for prophylactic, pt has hx of c.diff. IV abx. Pt takes meds crushed with appesauce. Pt and dtr updated poc. Call light in reach. Safety precaution in place. All needs are meet at this time.

## 2023-07-20 NOTE — PLAN OF CARE
Pt A&Ox1, alert to self and family. Primarily [place] speaking, daughters always at bedside translating/helping with ADLs. Room air, . C/o of cough, prn antitussives given. Tele, NSR/ST. Afebrile. IV abx. Po Vanco. Incontinent, briefed. Loose stools noted, cdiff/GI panel (-). No c/o of pain, sob, n/v. Bed bound at baseline. Pureed diet w/ thicken liquids, order set and 1:1 feed. Takes meds crushed in applesauce. Pt and pt's daughter updated on poc. Daughter at bedside overnight. No further needs at this time. Safety/fall/aspiration precautions in place. Call light within reach.         Problem: Patient/Family Goals  Goal: Patient/Family Long Term Goal  Description: Patient's Long Term Goal: Go home    Interventions:  - Abx  - See additional Care Plan goals for specific interventions  Outcome: Progressing  Goal: Patient/Family Short Term Goal  Description: Patient's Short Term Goal: Speech Eval  7/18 NOC: sleep well  7/19 AM: Eat  7/19 NOC: sleep well, eat more  Interventions:   - Fluids  - See additional Care Plan goals for specific interventions  Outcome: Progressing     Problem: PAIN - ADULT  Goal: Verbalizes/displays adequate comfort level or patient's stated pain goal  Description: INTERVENTIONS:  - Encourage pt to monitor pain and request assistance  - Assess pain using appropriate pain scale  - Administer analgesics based on type and severity of pain and evaluate response  - Implement non-pharmacological measures as appropriate and evaluate response  - Consider cultural and social influences on pain and pain management  - Manage/alleviate anxiety  - Utilize distraction and/or relaxation techniques  - Monitor for opioid side effects  - Notify MD/LIP if interventions unsuccessful or patient reports new pain  - Anticipate increased pain with activity and pre-medicate as appropriate  Outcome: Progressing     Problem: RISK FOR INFECTION - ADULT  Goal: Absence of fever/infection during anticipated neutropenic period  Description: INTERVENTIONS  - Monitor WBC  - Administer growth factors as ordered  - Implement neutropenic guidelines  Outcome: Progressing     Problem: SAFETY ADULT - FALL  Goal: Free from fall injury  Description: INTERVENTIONS:  - Assess pt frequently for physical needs  - Identify cognitive and physical deficits and behaviors that affect risk of falls.   - [place] fall precautions as indicated by assessment.  - Educate pt/family on patient safety including physical limitations  - Instruct pt to call for assistance with activity based on assessment  - Modify environment to reduce risk of injury  - Provide assistive devices as appropriate  - Consider OT/PT consult to assist with strengthening/mobility  - Encourage toileting schedule  Outcome: Progressing     Problem: DISCHARGE PLANNING  Goal: Discharge to home or other facility with appropriate resources  Description: INTERVENTIONS:  - Identify barriers to discharge w/pt and caregiver  - Include patient/family/discharge partner in discharge planning  - Arrange for needed discharge resources and transportation as appropriate  - Identify discharge learning needs (meds, wound care, etc)  - Arrange for interpreters to assist at discharge as needed  - Consider post-discharge preferences of patient/family/discharge partner  - Complete POLST form as appropriate  - Assess patient's ability to be responsible for managing their own health  - Refer to Case Management Department for coordinating discharge planning if the patient needs post-hospital services based on physician/LIP order or complex needs related to functional status, cognitive ability or social support system  Outcome: Progressing

## 2023-07-21 ENCOUNTER — APPOINTMENT (OUTPATIENT)
Dept: GENERAL RADIOLOGY | Facility: HOSPITAL | Age: 88
End: 2023-07-21
Attending: HOSPITALIST
Payer: MEDICARE

## 2023-07-21 ENCOUNTER — APPOINTMENT (OUTPATIENT)
Dept: GENERAL RADIOLOGY | Facility: HOSPITAL | Age: 88
DRG: 689 | End: 2023-07-21
Attending: HOSPITALIST
Payer: MEDICARE

## 2023-07-21 LAB

## 2023-07-21 PROCEDURE — 81001 URINALYSIS AUTO W/SCOPE: CPT | Performed by: INTERNAL MEDICINE

## 2023-07-21 PROCEDURE — 71045 X-RAY EXAM CHEST 1 VIEW: CPT | Performed by: HOSPITALIST

## 2023-07-21 RX ORDER — HYDRALAZINE HYDROCHLORIDE 20 MG/ML
5 INJECTION INTRAMUSCULAR; INTRAVENOUS EVERY 6 HOURS PRN
Status: DISCONTINUED | OUTPATIENT
Start: 2023-07-21 | End: 2023-07-26

## 2023-07-21 NOTE — CM/SW NOTE
Pt's dtr requested to speak with SW at bedside to assist with [employer] login. SW assisted pt with logging into [employer] and inquired on discharge needs. Pt's dtr stated she is pt's primary caregiver and has DME at home. Pt's dtr also arrived during conversation and inquired on tub/shower bench. SW informed pt's dtrs a tub/shower bench is not covered by insurance and encouraged pt's dtrs to purchase a tub/shower bench at a local pharmacy or online. SW discussed commode, pt's dtrs stated they have a commode and wheelchair for pt. No further discharge needs anticipated at this time. SW provided contact information for any additional needs. SW will continue to remain available.      OLIVIA Bentley  Discharge Planner

## 2023-07-21 NOTE — PLAN OF CARE
Problem: Patient/Family Goals  Goal: Patient/Family Long Term Goal  Description: Patient's Long Term Goal: to be discharged     Interventions:  - Follow MD orders  - See additional Care Plan goals for specific interventions  Outcome: Progressing  Goal: Patient/Family Short Term Goal  Description: Patient's Short Term Goal:   07/20NOC- Remain stable and get better    Interventions:   - IV abx  -Q2 turns  -Family at bedside   - See additional Care Plan goals for specific interventions  Outcome: Progressing

## 2023-07-21 NOTE — PROGRESS NOTES
07/20/23 1904   Provider Notification   Reason for Communication Other (comment)   Provider Name Other (comment)  (Dr. Davis Summers)   Method of Communication Page       Just  FYLA for pt in [phone] she was pretty lethargic tonight so I was unable to give her her night time meds which included PO vanco that is prophylactic for hx c.diff and HYdralazine. BP however was stable at 118/48. She is here with a UTI. Family did say she seems to be more sleepy tonight. Any new orders? Thx    Addendum- Per MD continue monitoring no new orders.

## 2023-07-21 NOTE — PROGRESS NOTES
Problem: UTI    Data: PT is lethargic unable to assess orientation. MD aware. See previous notes. . Room air on tele. SCD'S on. Briefed. Purewick in place. Does not seem in pain. No diarrhea so far. Q2 turns. Pt did not urinate overnight straight cath this am 375 our. U/A sent. Has BLUE swelling but no redness. Elevating extremities. Pt still lethargic this AM and unable to follow command to swallow even when daughter at bedside tried to talk to pt to swallow. Does open eyes to sternal rub. Intervention:IV abx, Q2 turns family at bedside. Bed alarm on. Safety precautions in place. Saline locked. Edu: Pt updated on POC. All questions answered at this time.

## 2023-07-21 NOTE — PLAN OF CARE
Patient is A/Ox0. Hx of Dementia. Somnolent. Daughter at the bedside. Non verbal. VSS. Afebrile. Appears comfortable and not in pain. Patient is on RA. . Lung sounds diminished. CXR done today. No Cough/SOB. on Tele NSR. Heparin. SCD on. Electrolyte protocol. Pureed/Nectar thickened Diet. Loss of appetite. Diminished urine output. UA clear. No N/V/Diarrhea. IV ABX. Bedrest baseline. Patient and family updated on plan of care.     Problem: Patient/Family Goals  Goal: Patient/Family Long Term Goal  Description: Patient's Long Term Goal: to be discharged     Interventions:  - Follow MD orders  - See additional Care Plan goals for specific interventions  Outcome: Progressing  Goal: Patient/Family Short Term Goal  Description: Patient's Short Term Goal:   07/20NOC- Remain stable and get better    Interventions:   - IV abx  -Q2 turns  -Family at bedside   - See additional Care Plan goals for specific interventions  Outcome: Progressing

## 2023-07-22 LAB
ANION GAP SERPL CALC-SCNC: 7 MMOL/L (ref 0–18)
BASOPHILS # BLD AUTO: 0.04 X10(3) UL (ref 0–0.2)
BASOPHILS NFR BLD AUTO: 0.7 %
BUN BLD-MCNC: 27 MG/DL (ref 7–18)
CALCIUM BLD-MCNC: 8.7 MG/DL (ref 8.5–10.1)
CHLORIDE SERPL-SCNC: 120 MMOL/L (ref 98–112)
CO2 SERPL-SCNC: 17 MMOL/L (ref 21–32)
CREAT BLD-MCNC: 0.57 MG/DL
EGFRCR SERPLBLD CKD-EPI 2021: 84 ML/MIN/1.73M2 (ref 60–?)
EOSINOPHIL # BLD AUTO: 0.26 X10(3) UL (ref 0–0.7)
EOSINOPHIL NFR BLD AUTO: 4.7 %
ERYTHROCYTE [DISTWIDTH] IN BLOOD BY AUTOMATED COUNT: 14.7 %
GLUCOSE BLD-MCNC: 82 MG/DL (ref 70–99)
HCT VFR BLD AUTO: 29.5 %
HGB BLD-MCNC: 9.7 G/DL
IMM GRANULOCYTES # BLD AUTO: 0.21 X10(3) UL (ref 0–1)
IMM GRANULOCYTES NFR BLD: 3.8 %
LYMPHOCYTES # BLD AUTO: 1.02 X10(3) UL (ref 1–4)
LYMPHOCYTES NFR BLD AUTO: 18.6 %
MCH RBC QN AUTO: 30.3 PG (ref 26–34)
MCHC RBC AUTO-ENTMCNC: 32.9 G/DL (ref 31–37)
MCV RBC AUTO: 92.2 FL
MONOCYTES # BLD AUTO: 0.42 X10(3) UL (ref 0.1–1)
MONOCYTES NFR BLD AUTO: 7.7 %
NEUTROPHILS # BLD AUTO: 3.54 X10 (3) UL (ref 1.5–7.7)
NEUTROPHILS # BLD AUTO: 3.54 X10(3) UL (ref 1.5–7.7)
NEUTROPHILS NFR BLD AUTO: 64.5 %
OSMOLALITY SERPL CALC.SUM OF ELEC: 302 MOSM/KG (ref 275–295)
PLATELET # BLD AUTO: 205 10(3)UL (ref 150–450)
POTASSIUM SERPL-SCNC: 3.6 MMOL/L (ref 3.5–5.1)
RBC # BLD AUTO: 3.2 X10(6)UL
SODIUM SERPL-SCNC: 144 MMOL/L (ref 136–145)
WBC # BLD AUTO: 5.5 X10(3) UL (ref 4–11)

## 2023-07-22 PROCEDURE — 80048 BASIC METABOLIC PNL TOTAL CA: CPT | Performed by: HOSPITALIST

## 2023-07-22 PROCEDURE — 85025 COMPLETE CBC W/AUTO DIFF WBC: CPT | Performed by: HOSPITALIST

## 2023-07-22 NOTE — SLP NOTE
Orders received for bedside swallow evaluation. Spoke with GERRY Moses who states the patient is currently too lethargic to participate in evaluation. SLP will continue to follow and complete evaluation as appropriate.     Jerry Sarmiento   Speech Language Pathologist  Office phone: [phone]  Pager: [phone], #[phone]

## 2023-07-22 NOTE — PLAN OF CARE
Pt is somnolent, nonverbal, unable to follow commands. Family at bedside. VSS, afebrile. Maintaining O2 sats WDL on RA. Encouraged IS. Tele, NSR. Heparin. EP. Last BM 7/18. General/honey thickened liquid diet, pills crushed in a/s. Incontinent, diminished urine output. Bladder scanned >400 this AM, see flowsheets. Bedbound. Denies pain. PIV, SL. No further needs at this time, continue POC. Safety precautions in place.      Problem: Patient/Family Goals  Goal: Patient/Family Long Term Goal  Description: Patient's Long Term Goal: to be discharged     Interventions:  - Follow MD orders  - See additional Care Plan goals for specific interventions  Outcome: Progressing  Goal: Patient/Family Short Term Goal  Description: Patient's Short Term Goal:   07/20NOC- Remain stable and get better  7/21 NOC: sleep    Interventions:   - IV abx  -Q2 turns  -Family at bedside   - See additional Care Plan goals for specific interventions  Outcome: Progressing

## 2023-07-22 NOTE — PLAN OF CARE
Problem: Patient/Family Goals  Goal: Patient/Family Long Term Goal  Description: Patient's Long Term Goal: to be discharged     Interventions:  - Follow MD orders  - See additional Care Plan goals for specific interventions  Outcome: Progressing  Goal: Patient/Family Short Term Goal  Description: Patient's Short Term Goal:   07/20NOC- Remain stable and get better  7/21 NOC: sleep    Interventions:   - IV abx  -Q2 turns  -Family at bedside   - See additional Care Plan goals for specific interventions  Outcome: Progressing     Problem: Impaired Functional Mobility  Goal: Achieve highest/safest level of mobility/gait  Description: Interventions:  - Assess patient's functional ability and stability  - Promote increasing activity/tolerance for mobility and gait  - Educate and engage patient/family in tolerated activity level and pre  Outcome: Progressing

## 2023-07-23 ENCOUNTER — APPOINTMENT (OUTPATIENT)
Dept: CT IMAGING | Facility: HOSPITAL | Age: 88
End: 2023-07-23
Attending: HOSPITALIST
Payer: MEDICARE

## 2023-07-23 ENCOUNTER — APPOINTMENT (OUTPATIENT)
Dept: CT IMAGING | Facility: HOSPITAL | Age: 88
DRG: 689 | End: 2023-07-23
Attending: HOSPITALIST
Payer: MEDICARE

## 2023-07-23 LAB
ANION GAP SERPL CALC-SCNC: 6 MMOL/L (ref 0–18)
BASOPHILS # BLD AUTO: 0.02 X10(3) UL (ref 0–0.2)
BASOPHILS NFR BLD AUTO: 0.4 %
BUN BLD-MCNC: 26 MG/DL (ref 7–18)
CALCIUM BLD-MCNC: 8.7 MG/DL (ref 8.5–10.1)
CHLORIDE SERPL-SCNC: 120 MMOL/L (ref 98–112)
CO2 SERPL-SCNC: 20 MMOL/L (ref 21–32)
CREAT BLD-MCNC: 0.64 MG/DL
EGFRCR SERPLBLD CKD-EPI 2021: 81 ML/MIN/1.73M2 (ref 60–?)
EOSINOPHIL # BLD AUTO: 0.18 X10(3) UL (ref 0–0.7)
EOSINOPHIL NFR BLD AUTO: 3.4 %
ERYTHROCYTE [DISTWIDTH] IN BLOOD BY AUTOMATED COUNT: 14.6 %
GLUCOSE BLD-MCNC: 103 MG/DL (ref 70–99)
HCT VFR BLD AUTO: 27.9 %
HGB BLD-MCNC: 9.4 G/DL
IMM GRANULOCYTES # BLD AUTO: 0.23 X10(3) UL (ref 0–1)
IMM GRANULOCYTES NFR BLD: 4.4 %
LYMPHOCYTES # BLD AUTO: 0.94 X10(3) UL (ref 1–4)
LYMPHOCYTES NFR BLD AUTO: 17.8 %
MCH RBC QN AUTO: 29.6 PG (ref 26–34)
MCHC RBC AUTO-ENTMCNC: 33.7 G/DL (ref 31–37)
MCV RBC AUTO: 87.7 FL
MONOCYTES # BLD AUTO: 0.46 X10(3) UL (ref 0.1–1)
MONOCYTES NFR BLD AUTO: 8.7 %
NEUTROPHILS # BLD AUTO: 3.45 X10 (3) UL (ref 1.5–7.7)
NEUTROPHILS # BLD AUTO: 3.45 X10(3) UL (ref 1.5–7.7)
NEUTROPHILS NFR BLD AUTO: 65.3 %
OSMOLALITY SERPL CALC.SUM OF ELEC: 307 MOSM/KG (ref 275–295)
PLATELET # BLD AUTO: 199 10(3)UL (ref 150–450)
POTASSIUM SERPL-SCNC: 3.5 MMOL/L (ref 3.5–5.1)
RBC # BLD AUTO: 3.18 X10(6)UL
SODIUM SERPL-SCNC: 146 MMOL/L (ref 136–145)
WBC # BLD AUTO: 5.3 X10(3) UL (ref 4–11)

## 2023-07-23 PROCEDURE — 92526 ORAL FUNCTION THERAPY: CPT

## 2023-07-23 PROCEDURE — 80048 BASIC METABOLIC PNL TOTAL CA: CPT | Performed by: HOSPITALIST

## 2023-07-23 PROCEDURE — 85025 COMPLETE CBC W/AUTO DIFF WBC: CPT | Performed by: HOSPITALIST

## 2023-07-23 PROCEDURE — 70450 CT HEAD/BRAIN W/O DYE: CPT | Performed by: HOSPITALIST

## 2023-07-23 PROCEDURE — 92610 EVALUATE SWALLOWING FUNCTION: CPT

## 2023-07-23 NOTE — PLAN OF CARE
Pt is somnolent, nonverbal, unable to follow commands. Family at bedside. VSS, afebrile. Maintaining O2 sats WDL on RA. Encouraged IS. Tele, NSR. Heparin. EP. Last BM 7/22. Pureed/honey thickened liquid diet, pills crushed in a/s. Incontinent, diminished urine output. Bedbound. Denies pain. PIV, SL. No further needs at this time, continue POC. Safety precautions in place. Problem: Patient/Family Goals  Goal: Patient/Family Long Term Goal  Description: Patient's Long Term Goal: to be discharged     Interventions:  - Follow MD orders  - See additional Care Plan goals for specific interventions  Outcome: Progressing  Goal: Patient/Family Short Term Goal  Description: Patient's Short Term Goal:   07/20NOC- Remain stable and get better  7/21 NOC: sleep  7/21 NOC: sleep    Interventions:   - IV abx  -Q2 turns  -Family at bedside   - See additional Care Plan goals for specific interventions  Outcome: Progressing     Problem: GASTROINTESTINAL - ADULT  Goal: Maintains or returns to baseline bowel function  Description: INTERVENTIONS:  - Assess bowel function  - Maintain adequate hydration with IV or PO as ordered and tolerated  - Evaluate effectiveness of GI medications  - Encourage mobilization and activity  - Obtain nutritional consult as needed  - Establish a toileting routine/schedule  - Consider collaborating with pharmacy to review patient's medication profile  Outcome: Progressing     Problem: NEUROLOGICAL - ADULT  Goal: Achieves stable or improved neurological status  Description: INTERVENTIONS  - Assess for and report changes in neurological status  - Initiate measures to prevent increased intracranial pressure  - Maintain blood pressure and fluid volume within ordered parameters to optimize cerebral perfusion and minimize risk of hemorrhage  - Monitor temperature, glucose, and sodium.  Initiate appropriate interventions as ordered  Outcome: Progressing     Problem: Impaired Functional Mobility  Goal: Achieve highest/safest level of mobility/gait  Description: Interventions:  - Assess patient's functional ability and stability  - Promote increasing activity/tolerance for mobility and gait  - Educate and engage patient/family in tolerated activity level and precautions    Outcome: Progressing

## 2023-07-23 NOTE — PLAN OF CARE
Problem: Patient/Family Goals  Goal: Patient/Family Long Term Goal  Description: Patient's Long Term Goal: to be discharged     Interventions:  - Follow MD orders  - See additional Care Plan goals for specific interventions  Outcome: Progressing  Goal: Patient/Family Short Term Goal  Description: Patient's Short Term Goal:   07/20NOC- Remain stable and get better  7/21 NOC: sleep  7/21 [address] [place]: sleep    Interventions:   - IV abx  -Q2 turns  -Family at bedside   - See additional Care Plan goals for specific interventions  Outcome: Progressing     Problem: Patient/Family Goals  Goal: Patient/Family Short Term Goal  Description: Patient's Short Term Goal:   07/20NOC- Remain stable and get better  7/21 [address] [place]: sleep  7/21 [address] [place]: sleep  7/22 BE MORE AWAKE   7/23 BE MORE AWAKE, EAT BETTER    Interventions:   - IV abx  -Q2 turns  -Family at bedside   - See additional Care Plan goals for specific interventions  Outcome: Progressing     Problem: NEUROLOGICAL - ADULT  Goal: Achieves stable or improved neurological status  Description: INTERVENTIONS  - Assess for and report changes in neurological status  - Initiate measures to prevent increased intracranial pressure  - Maintain blood pressure and fluid volume within ordered parameters to optimize cerebral perfusion and minimize risk of hemorrhage  - Monitor temperature, glucose, and sodium.  Initiate appropriate interventions as ordered  Outcome: Progressing     Problem: Impaired Functional Mobility  Goal: Achieve highest/safest level of mobility/gait  Description: Interventions:  - Assess patient's functional ability and stability  - Promote increasing activity/tolerance for mobility and gait  - Educate and engage patient/family in tolerated activity level and precautions  Outcome: Progressing

## 2023-07-23 NOTE — SLP NOTE
ADULT SWALLOWING RE-EVALUATION    ASSESSMENT      Orders received for swallow re-evaluation. Per MD note, \"patient cont to be extremely lethargic, sleepy and has not been able to eat anything , daughter concerned about her not being able to take po meds. No fevers. We will do speech and swallow evaluation to rule out any sort of aspiration; Patient is extremely lethargic likely secondary to developing left lower lobe infiltrate along with cough\". Patient has had initial evaluation this hospital visit on 7/18/23; please see below for details. Patient known to this service for a VFSS completed 9/26/22 which recommended patient be NPO. However, patient's daughter elected to accept risk of aspiration and feed her mother and refused PEG tube. Puree diet with honey-thick liquids was recommended which is her baseline diet since daughter declined NPO. Patient's most recent cxr 7/21 now revealed a left lower lobe opacity is concerning for early infiltrate. Patient was was unresponsive to cool cloth to head and loud verbal stimuli. Bruise noted on sternum from staff performing sternal rubs to increase alertness per daughter. Unable to assess patient due to severe lethargy. Daughter stated \"she's just sleeping right now\". Very long discussion with daughter re: normal vs abnormal swallow via [address] video presentation which was sent to daughter/POA email, the results of VFSS from 9/26/22 reviewed, aspiration and consequences from aspiration, aging larynx, aspiration risk for continued PO, current swallow status, and recommendations. Daughter asking nursing/medical questions re: Hospice and will defer to medical team.     Daughter needs reinforcement. Will continue to educate x1 more visit, then discharge from skilled inpatient ST. Medical team needs to have discussion with entire family re: hospice/comfort care vs repeated hospitalizations.  Daughter has indicated use of [employer] concerns repeatedly and needs reinforcement on role of a Hospice nurse. [address] given this date:   Access Code: [email or web address]  URL: [email or web address]  Date: 07/23/2023  Prepared by: Aishwarya Marcano  Patient Education  - Normal Swallow  - Aspiration During Swallow  - Aspiration Pneumonia  - Oral Care: How to Take Care of Your Mouth         RECOMMENDATIONS   Diet Recommendations - Solids: Puree   Diet Recommendations - Liquids: Honey thick liquids/ Moderately thick      Compensatory Strategies Recommended: Small bites and sips; Liquids via spoon  Aspiration Precautions: Slow rate;Upright position  Medication Administration Recommendations: Crushed in puree  Treatment Plan/Recommendations: education for family 1 more visit, then discharge       [facility]  Reason for Referral: RN dysphagia screen    Problem List  Principal Problem:    Urinary tract infection without hematuria, site unspecified  Active Problems:    Dehydration    Weakness generalized      Past Medical History  Past Medical History:   Diagnosis Date    Cervical arthritis 2014    Relates to neck and shoulder.-    Constipation     Esophageal reflux     High blood pressure     High cholesterol     Hypercholesterolemia     Hypertension     Language barrier     Daughters translate    Osteoarthrosis, unspecified whether generalized or localized, unspecified site     Osteoporosis     2014 DEXA    Reflux     Shoulder pain 6/2/2014    T12 compression fracture ([address] 3/21/2013    Daughter reported finding after fall and head injury    Urinary frequency     Had urology evaluation       Past Surgical History:   Procedure Laterality Date    CATARACT      Both eyes    CHOLECYSTECTOMY      FLUOR GID & Annemarie Mons NDL/CATH SPI DX/THER NJX N/A 6/20/2014    Procedure: CERVICAL EPIDURAL INJECTION;  Surgeon: Kenneth Cintron DO;  Location: [address] [place] [address] NDL/CATH SPI DX/THER Jayson [address] N/A 8/14/2014    Procedure: CERVICAL EPIDURAL; Surgeon: Carey Baker DO;  Location: [facility] [address] Lamona [address] NDL/CATH SPI DX/THER Jyason Pavan [address] N/A 2/26/2015    Procedure: CERVICAL EPIDURAL;  Surgeon: Carey Baker DO;  Location: [address]      From gallbladder surgery    HYSTERECTOMY  1968    INJECTION, W/WO CONTRAST, DX/THERAPEUTIC SUBSTANCE, EPIDURAL/SUBARACHNOID; CERVICAL/THORACIC N/A 6/20/2014    Procedure: CERVICAL EPIDURAL INJECTION;  Surgeon: Carey Baker DO;  Location: [facility]    INJECTION, W/WO CONTRAST, DX/THERAPEUTIC SUBSTANCE, EPIDURAL/SUBARACHNOID; CERVICAL/THORACIC N/A 8/14/2014    Procedure: CERVICAL EPIDURAL;  Surgeon: Carey Baker DO;  Location: [address] [place] [address] MANAGEMENT    INJECTION, W/WO CONTRAST, DX/THERAPEUTIC SUBSTANCE, EPIDURAL/SUBARACHNOID; CERVICAL/THORACIC N/A 2/26/2015    Procedure: CERVICAL EPIDURAL;  Surgeon: Carey Baker DO;  Location: [facility]    INJECTION, W/WO CONTRAST, DX/THERAPEUTIC SUBSTANCE, EPIDURAL/SUBARACHNOID; CERVICAL/THORACIC N/A 10/19/2015    Procedure: CERVICAL EPIDURAL INJECTION;  Surgeon: Carey Baker DO;  Location: [facility]    INJECTION, W/WO CONTRAST, DX/THERAPEUTIC SUBSTANCE, EPIDURAL/SUBARACHNOID; CERVICAL/THORACIC N/A 8/2/2016    Procedure: CERVICAL EPIDURAL INJECTION;  Surgeon: Carey Baker DO;  [facility] [address]  N SVC 5+ YR N/A 6/20/2014    Procedure: CERVICAL EPIDURAL INJECTION;  Surgeon: Carey Baker DO;  Location: [facility] SM PHYS [address]  N SVC 5+ YR N/A 8/14/2014    Procedure: CERVICAL EPIDURAL;  Surgeon: Carey Baker DO;  Location: [facility] SM PHYS [address]  N SVC 5+ YR N/A 2/26/2015    Procedure: CERVICAL EPIDURAL;  Surgeon: Carey Baker DO;  Location: [facility]  PHYS [address] [address] [address]  N SVC 5+ YR N/A 10/19/2015 Procedure: CERVICAL EPIDURAL INJECTION;  Surgeon: Juliana Quan DO;  Location: [address] [place] [address]    M-[facility] BY [facility] [address]  N SVC 5+ YR N/A 8/2/2016    Procedure: CERVICAL EPIDURAL INJECTION;  Surgeon: Juliana Quan DO;  Location: [address] [place] [address]    OTHER SURGICAL HISTORY N/A 5/29/2016    Procedure: EXPLORATORY LAPAROTOMY;  Surgeon: Jose Garrett MD;  Location: [address] [place] [address] MAIN OR    PATIENT DOCUMENTED NOT TO HAVE EXPERIENCED ANY OF THE FOLLOWING EVENTS N/A 6/20/2014    Procedure: CERVICAL EPIDURAL INJECTION;  Surgeon: Juliana Quan DO;  Location: [address] [place] [address]    PATIENT DOCUMENTED NOT TO HAVE EXPERIENCED ANY OF THE FOLLOWING EVENTS N/A 8/14/2014    Procedure: CERVICAL EPIDURAL;  Surgeon: Juliana Quan DO;  Location: [address] [place] [address]    PATIENT DOCUMENTED NOT TO HAVE EXPERIENCED ANY OF THE FOLLOWING EVENTS N/A 2/26/2015    Procedure: CERVICAL EPIDURAL;  Surgeon: Juliana Quan DO;  Location: [address] [place] [address]    PATIENT DOCUMENTED NOT TO HAVE EXPERIENCED ANY OF THE FOLLOWING EVENTS N/A 10/19/2015    Procedure: CERVICAL EPIDURAL INJECTION;  Surgeon: Juliana Quan DO;  Location: [address] [place] [address]    PATIENT DOCUMENTED NOT TO HAVE EXPERIENCED ANY OF THE FOLLOWING EVENTS N/A 8/2/2016    Procedure: CERVICAL EPIDURAL INJECTION;  Surgeon: Juliana Quan DO;  Location: [address] [place] [address]    PATIENT [facility] IV ANTIBIOTIC SURGICAL SITE INFECTION PROPHYLAXIS. N/A 6/20/2014    Procedure: CERVICAL EPIDURAL INJECTION;  Surgeon: Juliana Quan DO;  Location: [address] [place] [address]    PATIENT [place] INFECTION PROPHYLAXIS. N/A 8/14/2014    Procedure: CERVICAL EPIDURAL;  Surgeon: Juliana Quan DO;  Location: [facility]    PATIENT [address] [place] [address] FOR IV ANTIBIOTIC SURGICAL SITE INFECTION PROPHYLAXIS.  N/A 2/26/2015    Procedure: CERVICAL EPIDURAL;  Surgeon: Nahum Robbins DO;  Location: [facility]    PATIENT [address] FOR IV ANTIBIOTIC SURGICAL SITE INFECTION PROPHYLAXIS. N/A 10/19/2015    Procedure: CERVICAL EPIDURAL INJECTION;  Surgeon: Nahum Robbins DO;  Location: [address] [place] [address]    PATIENT [place] INFECTION PROPHYLAXIS. N/A 8/2/2016    Procedure: CERVICAL EPIDURAL INJECTION;  Surgeon: Nahum Robbins DO;  Location: [address] [place] [address]         Prior Living Situation: Home with support  Diet Prior to Admission: Honey thick liquids/ Moderately thick;Puree       Family goals: for patient to continue eating/drinking    SWALLOWING HISTORY     Current Diet Consistency: puree with honey thick liquids    Dysphagia History:   7/18/23: Clinical Swallow Evaluation by Devora Christina SLP:   Patient is a 81 y/o female admitted with nausea/vomiting with a PMHx significant for dementia. Patient known to this service for a VFSS completed 9/26/22 which recommended patient be NPO. However, patient's daughter elected to accept risk of aspiration and feed her mother and refused PEG tube. Despite aspiration risk exhibited by patient during past VFSS, her CXR on admission yesterday 7/17/23 did not reveal any acute pulmonary abnormality. Patient received drowsy, but arousable in bed with daughter at bedside. Patient's daughter reported patient consumes a puree diet and moderately thick liquids at baseline. She reported that she appears to be tolerating her diet. Patient nonverbal and unable to provide history. Patient presenting with oropharyngeal dysphagia. PO trials of moderately thick liquid and puree were administered. Patient accepted small amounts of moderately thick liquid and puree via spoon. No anterior spillage observed. AP transit was judged to be prolonged.  Patient initiated multiple swallows with each trial. Oral residue was unable to be assessed d/t patient's inability follow commands. Hyolaryngeal excursion was judged to be of reduced strength and timeliness per palpation. No overt s/s of aspiration observed. Education was provided to patient's daughter regarding aspiration risk with PO feeding. She verbalized understanding of the potential consequences and reported that she wishes to continue feeding her a puree diet and moderately thick liquids. No further SLP services warranted at this time as patient appears at baseline oropharyngeal swallow. Education provided re: results and recommendations. Diet Recommendations - Solids: Puree  Diet Recommendations - Liquids: Honey thick liquids/ Moderately thick   Compensatory Strategies Recommended: Small bites and sips; Liquids via spoon  Aspiration Precautions: Slow rate;Upright position  Medication Administration Recommendations: Crushed in puree  Treatment Plan/Recommendations: No further inpatient SLP service warranted    IMAGING   CXR: CONCLUSION:  Left lower lobe opacity is concerning for early infiltrate. Correlate clinically. Dictated by (CST): Osbaldo Franklin MD on 7/21/2023 at 12:58 PM       Finalized by (CST): Osbaldo Franklin MD on 7/21/2023 at 12:59 PM           OBJECTIVE   ORAL MOTOR EXAMINATION: unable to evaluate as pt unresponsive  SWALLOW: unable to evaluate as pt unresponsive              GOALS  Goal #1 Continue education on swallow status x1 more visit  Goal Established 7/24/23           FOLLOW UP  Treatment Plan/Recommendations: x1 one visit for continued education and reinforcement, then discharge.      Follow Up Needed (Documentation Required): yes       Thank you for your referral.   If you have any questions, please contact JASON Elnea

## 2023-07-24 LAB
ANION GAP SERPL CALC-SCNC: 4 MMOL/L (ref 0–18)
BASOPHILS # BLD AUTO: 0.03 X10(3) UL (ref 0–0.2)
BASOPHILS NFR BLD AUTO: 0.6 %
BUN BLD-MCNC: 25 MG/DL (ref 7–18)
CALCIUM BLD-MCNC: 8.5 MG/DL (ref 8.5–10.1)
CHLORIDE SERPL-SCNC: 122 MMOL/L (ref 98–112)
CO2 SERPL-SCNC: 21 MMOL/L (ref 21–32)
CREAT BLD-MCNC: 0.58 MG/DL
EGFRCR SERPLBLD CKD-EPI 2021: 83 ML/MIN/1.73M2 (ref 60–?)
EOSINOPHIL # BLD AUTO: 0.1 X10(3) UL (ref 0–0.7)
EOSINOPHIL NFR BLD AUTO: 1.9 %
ERYTHROCYTE [DISTWIDTH] IN BLOOD BY AUTOMATED COUNT: 15.2 %
GLUCOSE BLD-MCNC: 113 MG/DL (ref 70–99)
HCT VFR BLD AUTO: 25.3 %
HGB BLD-MCNC: 8.2 G/DL
IMM GRANULOCYTES # BLD AUTO: 0.16 X10(3) UL (ref 0–1)
IMM GRANULOCYTES NFR BLD: 3 %
LYMPHOCYTES # BLD AUTO: 0.84 X10(3) UL (ref 1–4)
LYMPHOCYTES NFR BLD AUTO: 16 %
MCH RBC QN AUTO: 30 PG (ref 26–34)
MCHC RBC AUTO-ENTMCNC: 32.4 G/DL (ref 31–37)
MCV RBC AUTO: 92.7 FL
MONOCYTES # BLD AUTO: 0.49 X10(3) UL (ref 0.1–1)
MONOCYTES NFR BLD AUTO: 9.3 %
NEUTROPHILS # BLD AUTO: 3.64 X10 (3) UL (ref 1.5–7.7)
NEUTROPHILS # BLD AUTO: 3.64 X10(3) UL (ref 1.5–7.7)
NEUTROPHILS NFR BLD AUTO: 69.2 %
OSMOLALITY SERPL CALC.SUM OF ELEC: 309 MOSM/KG (ref 275–295)
PLATELET # BLD AUTO: 212 10(3)UL (ref 150–450)
POTASSIUM SERPL-SCNC: 3.1 MMOL/L (ref 3.5–5.1)
RBC # BLD AUTO: 2.73 X10(6)UL
SODIUM SERPL-SCNC: 147 MMOL/L (ref 136–145)
WBC # BLD AUTO: 5.3 X10(3) UL (ref 4–11)

## 2023-07-24 PROCEDURE — 85025 COMPLETE CBC W/AUTO DIFF WBC: CPT | Performed by: HOSPITALIST

## 2023-07-24 PROCEDURE — 92526 ORAL FUNCTION THERAPY: CPT

## 2023-07-24 PROCEDURE — 92610 EVALUATE SWALLOWING FUNCTION: CPT

## 2023-07-24 PROCEDURE — 80048 BASIC METABOLIC PNL TOTAL CA: CPT | Performed by: HOSPITALIST

## 2023-07-24 RX ORDER — DEXTROSE MONOHYDRATE, SODIUM CHLORIDE, AND POTASSIUM CHLORIDE 50; .745; 4.5 G/1000ML; G/1000ML; G/1000ML
INJECTION, SOLUTION INTRAVENOUS CONTINUOUS
Status: DISCONTINUED | OUTPATIENT
Start: 2023-07-24 | End: 2023-07-25

## 2023-07-24 NOTE — CM/SW NOTE
CARLEY received hospice referral from Dr. Liz Garcia. CARLEY will arrange a time to meet with patient's daughter for hospice informational.  Aidin referral sent.     Miguel A Valente [facility]  [phone]

## 2023-07-24 NOTE — CM/SW NOTE
CARLEY met with the patient's daughter outside the patient's room per her request.  She specified that she does not like hospice. She has had a previous bad experience with hospice that is still bothersome to her. CARLEY explained hospice services to daughter. She states she is the [employer] and the eldest child of 6. Her brother is an MD along with her 2 nieces. She says she needs to consult with her 7 siblings before making a decision on hospice. We talked about if the patient aspirates, hospice will keep her mom comfortable. She informed SW that a g-tube is not a good idea per her understanding. The patient's daughter also says she has had bad experiences with APNs, and medical assistants and generally only wants to speak with MD.  She did not want to speak with our hospice RN. Daughter says she will call the hospice number if and when she has decided on hospice but does not want follow up from hospice at all. CARLEY will inform medical team of update.     Liang Luna LCSW  [facility]  [phone]

## 2023-07-24 NOTE — PLAN OF CARE
Pt is admitted for PNA. Contact plus precaution for Hx Cdiff. Pt is somnolence and pt is arousal with verbal stimuli. Pt unable to follow command. VSS, afebrile and denies any pain. SpO2 maintained on RA. . Tele-NSR/ST. SCD is on. Heparin. Reg diet/ pureed and nectar thick liquid. Pt coughed with food in the morning. Speech reevaluated and daighter discussed about aspiration PNA. Med crushed with apple sauce. Denies any n/v/d. Incontinent . Pt voided herself. Bedrest. Q2 turn . IV fluid. IV ABX. Right arm extended IV was bleeding, removed IV and ACE wrapped. WCTM. Pt is updated with plan of care.           Problem: Patient/Family Goals  Goal: Patient/Family Long Term Goal  Description: Patient's Long Term Goal: to be discharged     Interventions:  - Follow MD orders  - See additional Care Plan goals for specific interventions  Outcome: Progressing  Goal: Patient/Family Short Term Goal  Description: Patient's Short Term Goal:   07/20NOC- Remain stable and get better  7/21 NOC: sleep  7/22 [address] [place]: sleep  7/23 [address] [place]: sleep  7/24: swallow food without any aspiration   Interventions:  -speech, elevate head   - IV abx  -Q2 turns  -Family at bedside   - See additional Care Plan goals for specific interventions  Outcome: Progressing     Problem: GASTROINTESTINAL - ADULT  Goal: Maintains or returns to baseline bowel function  Description: INTERVENTIONS:  - Assess bowel function  - Maintain adequate hydration with IV or PO as ordered and tolerated  - Evaluate effectiveness of GI medications  - Encourage mobilization and activity  - Obtain nutritional consult as needed  - Establish a toileting routine/schedule  - Consider collaborating with pharmacy to review patient's medication profile  Outcome: Progressing     Problem: NEUROLOGICAL - ADULT  Goal: Achieves stable or improved neurological status  Description: INTERVENTIONS  - Assess for and report changes in neurological status  - Initiate measures to prevent increased intracranial pressure  - Maintain blood pressure and fluid volume within ordered parameters to optimize cerebral perfusion and minimize risk of hemorrhage  - Monitor temperature, glucose, and sodium.  Initiate appropriate interventions as ordered  Outcome: Progressing     Problem: Impaired Functional Mobility  Goal: Achieve highest/safest level of mobility/gait  Description: Interventions:  - Assess patient's functional ability and stability  - Promote increasing activity/tolerance for mobility and gait  - Educate and engage patient/family in tolerated activity level and precautions    Outcome: Progressing

## 2023-07-24 NOTE — SLP NOTE
ADULT SWALLOWING EVALUATION    ASSESSMENT    ASSESSMENT/OVERALL IMPRESSION:  Patient seen for swallowing evaluation as she was noted to cough after swallowing this morning. Patient well known to this department from previous admission and this admission. VFSS from 9/26/22 recommended NPO due to severe dysphagia. Patient's daughter elected to accept risk of aspiration and continue to feed her mother puree and moderately thick liquids by tsp. She reports she is very careful to feed her mother at home only when she is upright and all trials by tsp. She does report that she notes coughing episodes at times with eating but not regularly. Unable to conduct formal oral mechanism exam. Patient was able to accept puree and moderately thick liquids by tsp presented by SLP in approximately 1/3 tsp amounts. Oral prep and transit were prolonged with some suspected tongue pumping though with complete oral clearance. Laryngeal excursion judged to be of reduced strength and timeliness to palpation. There were no overt signs of aspiration noted. SLP had long discussion with patient daughter regarding known history of dysphagia and today's performance. Patient daughter volunteers that she is not in favor of PEG or TPN as nutrition alternatives. She wishes to continue to feed her mother accepting risk of aspiration as previously discussed. Discussed proper upright positioning, liquids and solids by tsp in small amounts. Patient daughter with multiple questions regarding feeding with focus on reducing aspiration risk. Patient daughter verbalized understanding and agreement. RECOMMENDATIONS   Diet Recommendations - Solids: Puree  Diet Recommendations - Liquids: Honey thick liquids/ Moderately thick (by tsp only)                        Compensatory Strategies Recommended: Small bites and sips; Liquids via spoon  Aspiration Precautions: Slow rate;Upright position  Medication Administration Recommendations: Crushed in laura  Treatment Plan/Recommendations: Dysphagia therapy       HISTORY   MEDICAL HISTORY  Reason for Referral: RN dysphagia screen    Problem List  Principal Problem:    Urinary tract infection without hematuria, site unspecified  Active Problems:    Dehydration    Weakness generalized      Past Medical History  Past Medical History:   Diagnosis Date    Cervical arthritis 2014    Relates to neck and shoulder.-    Constipation     Esophageal reflux     High blood pressure     High cholesterol     Hypercholesterolemia     Hypertension     Language barrier     Daughters translate    Osteoarthrosis, unspecified whether generalized or localized, unspecified site     Osteoporosis     2014 DEXA    Reflux     Shoulder pain 6/2/2014    T12 compression fracture ([address] 3/21/2013    Daughter reported finding after fall and head injury    Urinary frequency     Had urology evaluation       Prior Living Situation: Home with support  Diet Prior to Admission: Honey thick liquids/ Moderately thick;Puree       Patient/Family Goals: to get better and go home    SWALLOWING HISTORY  Current Diet Consistency: NPO  Dysphagia History: as above  Imaging Results:   Brain CT from 7/23/23 revealed:  CONCLUSION:  Diffuse cerebral and cerebellar atrophy with chronic microvascular ischemic changes of aging. 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 LOCATION:  [facility]         Dictated by (CST): Salome Borrero MD on 7/23/2023 at 8:54 AM       Finalized by (CST): Salome Borrero MD on 7/23/2023 at 9:04 AM       CXR from 7/21/23 revealed:  CONCLUSION:  Left lower lobe opacity is concerning for early infiltrate. Correlate clinically.          LOCATION:  [facility]                  Dictated by (CST): Tamika Burnham MD on 7/21/2023 at 12:58 PM       Finalized by (CST): Tamika Burnham MD on 7/21/2023 at 12:59 PM     SUBJECTIVE       OBJECTIVE   ORAL MOTOR EXAMINATION  Dentition: Edentulous  Symmetry: Unable to assess  Strength: Unable to assess Range of Motion: Unable to assess  Rate of Motion: Unable to assess    Voice Quality: Clear  Respiratory Status: Unlabored  Consistencies Trialed: Honey thick liquids;Puree  Method of Presentation: Staff/Clinician assistance;Spoon  Patient Positioning: Midline;Upright    Oral Phase of Swallow: Impaired  Bolus Retrieval: Intact  Bilabial Seal: Intact  Bolus Formation: Intact  Bolus Propulsion: Impaired (prolonged)     Retention: Intact    Pharyngeal Phase of Swallow: Impaired  Laryngeal Elevation Timing: Appears impaired  Laryngeal Elevation Strength: Appears impaired  Laryngeal Elevation Coordination: Appears intact  (Please note: Silent aspiration cannot be evaluated clinically.  Videofluoroscopic Swallow Study is required to rule-out silent aspiration.)    Esophageal Phase of Swallow: No complaints consistent with possible esophageal involvement            GOALS  Goal #1 SLP will provide further education/support to patient daughter as needed per her request  In Progress     FOLLOW UP  Treatment Plan/Recommendations: Dysphagia therapy  Number of Visits to Meet Established Goals: 1  Follow Up Needed (Documentation Required): Yes  SLP Follow-up Date: 07/25/23    Thank you for your referral.   If you have any questions, please contact Nancy Saha [address]itesh [phone] [facility]  Pager [phone]

## 2023-07-24 NOTE — PLAN OF CARE
Pt is somnolent, nonverbal, unable to follow commands. Family at bedside. VSS, afebrile. Maintaining O2 sats WDL on RA. Encouraged IS. Tele, NSR. Heparin. EP. Last BM 7/22. Pureed/honey thickened liquid diet, pills crushed in a/s. Incontinent, diminished urine output. Bedbound. Denies pain. PIV, IV ABX. No further needs at this time, continue POC. Safety precautions in place. Problem: Patient/Family Goals  Goal: Patient/Family Long Term Goal  Description: Patient's Long Term Goal: to be discharged     Interventions:  - Follow MD orders  - See additional Care Plan goals for specific interventions  Outcome: Progressing  Goal: Patient/Family Short Term Goal  Description: Patient's Short Term Goal:   07/20NOC- Remain stable and get better  7/21 NOC: sleep  7/22 [address] [place]: sleep  7/23 NOC: sleep    Interventions:   - IV abx  -Q2 turns  -Family at bedside   - See additional Care Plan goals for specific interventions  Outcome: Progressing     Problem: GASTROINTESTINAL - ADULT  Goal: Maintains or returns to baseline bowel function  Description: INTERVENTIONS:  - Assess bowel function  - Maintain adequate hydration with IV or PO as ordered and tolerated  - Evaluate effectiveness of GI medications  - Encourage mobilization and activity  - Obtain nutritional consult as needed  - Establish a toileting routine/schedule  - Consider collaborating with pharmacy to review patient's medication profile  Outcome: Progressing     Problem: NEUROLOGICAL - ADULT  Goal: Achieves stable or improved neurological status  Description: INTERVENTIONS  - Assess for and report changes in neurological status  - Initiate measures to prevent increased intracranial pressure  - Maintain blood pressure and fluid volume within ordered parameters to optimize cerebral perfusion and minimize risk of hemorrhage  - Monitor temperature, glucose, and sodium.  Initiate appropriate interventions as ordered  Outcome: Progressing     Problem: Impaired Functional Mobility  Goal: Achieve highest/safest level of mobility/gait  Description: Interventions:  - Assess patient's functional ability and stability  - Promote increasing activity/tolerance for mobility and gait  - Educate and engage patient/family in tolerated activity level and precautions    Outcome: Progressing

## 2023-07-25 LAB
ANION GAP SERPL CALC-SCNC: 5 MMOL/L (ref 0–18)
BASOPHILS # BLD AUTO: 0.03 X10(3) UL (ref 0–0.2)
BASOPHILS NFR BLD AUTO: 0.6 %
BUN BLD-MCNC: 18 MG/DL (ref 7–18)
CALCIUM BLD-MCNC: 8.1 MG/DL (ref 8.5–10.1)
CHLORIDE SERPL-SCNC: 123 MMOL/L (ref 98–112)
CO2 SERPL-SCNC: 21 MMOL/L (ref 21–32)
CREAT BLD-MCNC: 0.67 MG/DL
EGFRCR SERPLBLD CKD-EPI 2021: 80 ML/MIN/1.73M2 (ref 60–?)
EOSINOPHIL # BLD AUTO: 0.12 X10(3) UL (ref 0–0.7)
EOSINOPHIL NFR BLD AUTO: 2.4 %
ERYTHROCYTE [DISTWIDTH] IN BLOOD BY AUTOMATED COUNT: 15.9 %
GLUCOSE BLD-MCNC: 119 MG/DL (ref 70–99)
HCT VFR BLD AUTO: 25.6 %
HGB BLD-MCNC: 7.9 G/DL
IMM GRANULOCYTES # BLD AUTO: 0.08 X10(3) UL (ref 0–1)
IMM GRANULOCYTES NFR BLD: 1.6 %
LYMPHOCYTES # BLD AUTO: 0.87 X10(3) UL (ref 1–4)
LYMPHOCYTES NFR BLD AUTO: 17.4 %
MCH RBC QN AUTO: 29 PG (ref 26–34)
MCHC RBC AUTO-ENTMCNC: 30.9 G/DL (ref 31–37)
MCV RBC AUTO: 94.1 FL
MONOCYTES # BLD AUTO: 0.47 X10(3) UL (ref 0.1–1)
MONOCYTES NFR BLD AUTO: 9.4 %
NEUTROPHILS # BLD AUTO: 3.43 X10 (3) UL (ref 1.5–7.7)
NEUTROPHILS # BLD AUTO: 3.43 X10(3) UL (ref 1.5–7.7)
NEUTROPHILS NFR BLD AUTO: 68.6 %
OSMOLALITY SERPL CALC.SUM OF ELEC: 311 MOSM/KG (ref 275–295)
PLATELET # BLD AUTO: 207 10(3)UL (ref 150–450)
POTASSIUM SERPL-SCNC: 3.3 MMOL/L (ref 3.5–5.1)
RBC # BLD AUTO: 2.72 X10(6)UL
SODIUM SERPL-SCNC: 149 MMOL/L (ref 136–145)
WBC # BLD AUTO: 5 X10(3) UL (ref 4–11)

## 2023-07-25 PROCEDURE — 85025 COMPLETE CBC W/AUTO DIFF WBC: CPT | Performed by: HOSPITALIST

## 2023-07-25 PROCEDURE — 92526 ORAL FUNCTION THERAPY: CPT

## 2023-07-25 PROCEDURE — C9113 INJ PANTOPRAZOLE SODIUM, VIA: HCPCS | Performed by: HOSPITALIST

## 2023-07-25 PROCEDURE — 80048 BASIC METABOLIC PNL TOTAL CA: CPT | Performed by: HOSPITALIST

## 2023-07-25 RX ORDER — VANCOMYCIN HYDROCHLORIDE 125 MG/1
125 CAPSULE ORAL DAILY
Qty: 7 CAPSULE | Refills: 0 | Status: SHIPPED | OUTPATIENT
Start: 2023-07-25 | End: 2023-08-01

## 2023-07-25 RX ORDER — DEXTROSE MONOHYDRATE 50 MG/ML
INJECTION, SOLUTION INTRAVENOUS CONTINUOUS
Status: DISCONTINUED | OUTPATIENT
Start: 2023-07-25 | End: 2023-07-26

## 2023-07-25 NOTE — PLAN OF CARE
Pt is admitted for PNA. Contact plus precaution for Hx Cdiff. Pt is somnolence and pt is arousal with verbal stimuli. Pt unable to follow command. VSS, afebrile and denies any pain. SpO2 maintained on RA. . Tele-NSR/ST. SCD is on. Heparin. Reg diet/ pureed and nectar thick liquid. Med crushed with apple sauce. Denies any n/v/d. Incontinent . Pt voided by herself. Bedrest. Q2 turn . IV fluid is infusing through right side extended IV. IV ABX. Upper chest bruised and healing. Possible DC tomorrow . WCTM. Pt is updated with plan of care.            Problem: Patient/Family Goals  Goal: Patient/Family Long Term Goal  Description: Patient's Long Term Goal: to be discharged     Interventions:  - Follow MD orders  - See additional Care Plan goals for specific interventions  Outcome: Progressing  Goal: Patient/Family Short Term Goal  Description: Patient's Short Term Goal:   07/20NOC- Remain stable and get better  7/21 NOC: sleep  7/22 [address] [place]: sleep  7/23 [address] [place]: sleep  7/24: swallow food without any aspiration   7/24noc: rest, sleep, safety  7/25: eat more without cough, more awake   Interventions:  -aspiration precaution   -speech, elevate head   - IV abx  -Q2 turns  -Family at bedside   -Aspiration precautions  - See additional Care Plan goals for specific interventions  Outcome: Progressing     Problem: GASTROINTESTINAL - ADULT  Goal: Maintains or returns to baseline bowel function  Description: INTERVENTIONS:  - Assess bowel function  - Maintain adequate hydration with IV or PO as ordered and tolerated  - Evaluate effectiveness of GI medications  - Encourage mobilization and activity  - Obtain nutritional consult as needed  - Establish a toileting routine/schedule  - Consider collaborating with pharmacy to review patient's medication profile  Outcome: Progressing     Problem: NEUROLOGICAL - ADULT  Goal: Achieves stable or improved neurological status  Description: INTERVENTIONS  - Assess for and report changes in neurological status  - Initiate measures to prevent increased intracranial pressure  - Maintain blood pressure and fluid volume within ordered parameters to optimize cerebral perfusion and minimize risk of hemorrhage  - Monitor temperature, glucose, and sodium.  Initiate appropriate interventions as ordered  Outcome: Progressing     Problem: Impaired Functional Mobility  Goal: Achieve highest/safest level of mobility/gait  Description: Interventions:  - Assess patient's functional ability and stability  - Promote increasing activity/tolerance for mobility and gait  - Educate and engage patient/family in tolerated activity level and precautions    Outcome: Progressing

## 2023-07-25 NOTE — PLAN OF CARE
Patient somnolent, difficult to aouse, physical assessment limited. Responds to pain, appears comfortable, family at bedside. Patient resting in bed, no apparent distress. Patient resting on RA. Patient remains on tele and  monitoring. Patient incontinent x2, briefed,    IVF. IV & PO abx. Aspiration precautions, meds crushed in pureed type food, nectar thick liquids. Isolation measures in place. Safety/fall precautions in place. Call light in reach.        Problem: Patient/Family Goals  Goal: Patient/Family Long Term Goal  Description: Patient's Long Term Goal: to be discharged     Interventions:  - Follow MD orders  - See additional Care Plan goals for specific interventions  Outcome: Progressing  Goal: Patient/Family Short Term Goal  Description: Patient's Short Term Goal:   07/20NOC- Remain stable and get better  7/21 NOC: sleep  7/22 [address] [place]: sleep  7/23 [address] [place]: sleep  7/24: swallow food without any aspiration   7/24noc: rest, sleep, safety  Interventions:  -speech, elevate head   - IV abx  -Q2 turns  -Family at bedside   -Aspiration precautions  - See additional Care Plan goals for specific interventions  Outcome: Progressing     Problem: GASTROINTESTINAL - ADULT  Goal: Maintains or returns to baseline bowel function  Description: INTERVENTIONS:  - Assess bowel function  - Maintain adequate hydration with IV or PO as ordered and tolerated  - Evaluate effectiveness of GI medications  - Encourage mobilization and activity  - Obtain nutritional consult as needed  - Establish a toileting routine/schedule  - Consider collaborating with pharmacy to review patient's medication profile  Outcome: Progressing     Problem: NEUROLOGICAL - ADULT  Goal: Achieves stable or improved neurological status  Description: INTERVENTIONS  - Assess for and report changes in neurological status  - Initiate measures to prevent increased intracranial pressure  - Maintain blood pressure and fluid volume within ordered parameters to optimize cerebral perfusion and minimize risk of hemorrhage  - Monitor temperature, glucose, and sodium.  Initiate appropriate interventions as ordered  Outcome: Progressing     Problem: Impaired Functional Mobility  Goal: Achieve highest/safest level of mobility/gait  Description: Interventions:  - Assess patient's functional ability and stability  - Promote increasing activity/tolerance for mobility and gait  - Educate and engage patient/family in tolerated activity level and precautions  Outcome: Progressing

## 2023-07-26 VITALS
RESPIRATION RATE: 16 BRPM | TEMPERATURE: 98 F | SYSTOLIC BLOOD PRESSURE: 157 MMHG | DIASTOLIC BLOOD PRESSURE: 53 MMHG | HEART RATE: 66 BPM | OXYGEN SATURATION: 98 % | BODY MASS INDEX: 20 KG/M2 | WEIGHT: 101.38 LBS

## 2023-07-26 LAB
ANION GAP SERPL CALC-SCNC: 5 MMOL/L (ref 0–18)
BASOPHILS # BLD AUTO: 0.03 X10(3) UL (ref 0–0.2)
BASOPHILS NFR BLD AUTO: 0.6 %
BUN BLD-MCNC: 18 MG/DL (ref 7–18)
CALCIUM BLD-MCNC: 8.2 MG/DL (ref 8.5–10.1)
CHLORIDE SERPL-SCNC: 119 MMOL/L (ref 98–112)
CO2 SERPL-SCNC: 21 MMOL/L (ref 21–32)
CREAT BLD-MCNC: 0.55 MG/DL
EGFRCR SERPLBLD CKD-EPI 2021: 84 ML/MIN/1.73M2 (ref 60–?)
EOSINOPHIL # BLD AUTO: 0.22 X10(3) UL (ref 0–0.7)
EOSINOPHIL NFR BLD AUTO: 4.6 %
ERYTHROCYTE [DISTWIDTH] IN BLOOD BY AUTOMATED COUNT: 16.3 %
GLUCOSE BLD-MCNC: 91 MG/DL (ref 70–99)
HCT VFR BLD AUTO: 24.4 %
HGB BLD-MCNC: 7.9 G/DL
IMM GRANULOCYTES # BLD AUTO: 0.07 X10(3) UL (ref 0–1)
IMM GRANULOCYTES NFR BLD: 1.5 %
LYMPHOCYTES # BLD AUTO: 1.07 X10(3) UL (ref 1–4)
LYMPHOCYTES NFR BLD AUTO: 22.5 %
MCH RBC QN AUTO: 30.3 PG (ref 26–34)
MCHC RBC AUTO-ENTMCNC: 32.4 G/DL (ref 31–37)
MCV RBC AUTO: 93.5 FL
MONOCYTES # BLD AUTO: 0.46 X10(3) UL (ref 0.1–1)
MONOCYTES NFR BLD AUTO: 9.7 %
NEUTROPHILS # BLD AUTO: 2.9 X10 (3) UL (ref 1.5–7.7)
NEUTROPHILS # BLD AUTO: 2.9 X10(3) UL (ref 1.5–7.7)
NEUTROPHILS NFR BLD AUTO: 61.1 %
OSMOLALITY SERPL CALC.SUM OF ELEC: 301 MOSM/KG (ref 275–295)
PLATELET # BLD AUTO: 211 10(3)UL (ref 150–450)
POTASSIUM SERPL-SCNC: 3.4 MMOL/L (ref 3.5–5.1)
RBC # BLD AUTO: 2.61 X10(6)UL
SODIUM SERPL-SCNC: 145 MMOL/L (ref 136–145)
WBC # BLD AUTO: 4.8 X10(3) UL (ref 4–11)

## 2023-07-26 PROCEDURE — 85025 COMPLETE CBC W/AUTO DIFF WBC: CPT | Performed by: HOSPITALIST

## 2023-07-26 PROCEDURE — 80048 BASIC METABOLIC PNL TOTAL CA: CPT | Performed by: HOSPITALIST

## 2023-07-26 PROCEDURE — C9113 INJ PANTOPRAZOLE SODIUM, VIA: HCPCS | Performed by: HOSPITALIST

## 2023-07-26 PROCEDURE — 92526 ORAL FUNCTION THERAPY: CPT

## 2023-07-26 RX ORDER — DEXTROMETHORPHAN POLISTIREX 30 MG/5ML
5 SUSPENSION ORAL 2 TIMES DAILY
Qty: 300 ML | Refills: 0 | Status: SHIPPED | OUTPATIENT
Start: 2023-07-26

## 2023-07-26 RX ORDER — FERROUS SULFATE 220 (44)/5
220 SOLUTION, ORAL ORAL
Qty: 450 ML | Refills: 0 | Status: SHIPPED | OUTPATIENT
Start: 2023-07-26 | End: 2023-08-25

## 2023-07-26 NOTE — DISCHARGE SUMMARY
[facility] Hospitalist Discharge Summary     Patient ID:  Thelma Mora  80year old  5/27/1928    Admit date: 7/17/2023    Discharge date and time: 07/26/23     Attending Physician: Kayla Benjamin*     Primary Care Physician: Amelie Catalan MD     Discharge Diagnoses: Dehydration [E86.0]  Weakness generalized [R53.1]  Urinary tract infection without hematuria, site unspecified [N39.0]    Please note that only IHP DMG and EMG patients enrolled in the Medicare ACO, [facility] ACO and [address] [place] [address] will be handled by the [address] [place], [place] [address] Management team.  For all other patients, please follow usual protocol for discharge care transition. Discharge Condition: stable    Disposition:  home with [facility]     Important Follow up:  - PCP within 2 weeks       Jas Goldsmith MD Follow up. Specialty: UROLOGY  Why: urology follow-up after discharge to discuss bladder stone  Contact information:  [address]  [address]  [facility] [phone]                                 Hospital Course:        80year old female with PMH sig for unspecified dementia, GERD, osteoporosis, bedbound status lives at home with daughter who is primary caregiver presented with vomiting. Patient's daughter was changing her and when she turned her she started vomiting. Denies any fevers. Reports that the patient has not been eating or drinking normally over the last few days. Also reporting some diarrhea over the last few days. In the ER she was slightly tachycardic. Labs significant for creatinine of 1.1 and urinalysis suggestive of UTI. CT A/P wo contrast with no obvious abnormalities. Started on ceftriaxone and fluids and admitted for further evaluation and treatment. This morning she continues to have episodes of vomiting.      Cough, lethargy  Chest x-ray done shows left lower lobe infiltrate-  SLP eval done however rec NPO in the past, however daughter had declined, rec pure with honey thick liquids   -Has been on antibiotics this whole time  -cont merem for now - completed 1w course 7/25  -CT head without any acute changes, age related changes noted   -patient is hospice appropriate - POA agreeable to meeting with hospice       Acute UTI, ESBL E.coli  - ceftriaxone to meropenem 7/19 - completed course  - has hx of C.diff in 12/'22, po vanc prophylaxis   - will consult ID for abx recs -continue IV meropenem for now, sensitivities reviewed-okay to transition to p.o. however patient is lethargic and unable to take p.o. meds  - CT with 1cm bladder stone that was being worked up with urology as OP, will consult for further recommendations given concern for UTI   -Patient to follow-up with Dr. Jorge Dennis to discuss bladder stone as an outpatient, urology signed off  - completed merrem course   - Patient is hospice appropriate however daughter having a hard time with it - speech therapy recommended NPO given degree of dysphagia but daughter/POA not interested in hospice or PEG tube; will cont feeding at home and she understands the risk of aspiration and respiratory failure      Hypernatremia - resolved  - poor po intake  - start d5w infusion     Vomiting, reoslving   - likely from infection   - no acute abnormalities seen on CT  - i resolved  - prn antiemetics      VERENICE on CKD, POA now resolved, with ZENOBIA from fluids  - range around 1  - will dc fluids, monitor for correction of acidosis      Essential HTN  - hydralazine TID     Dementia, unspecified with hypoactive delirium  - wo behavioral disturbances     GERD  - ppi      Cough  -Antitussives    Consults: IP CONSULT TO SOCIAL WORK  NURSING CONSULT TO DIETITIAN  IP CONSULT TO INFECTIOUS DISEASE  IP CONSULT TO UROLOGY  IP CONSULT TO VASCULAR ACCESS TEAM    Operative Procedures:        Patient instructions:      I as the attending physician reconciled the current and discharge medications on day of discharge. Current Discharge Medication List    START taking these medications    ferrous sulfate 220 (44 Fe) MG/5ML Oral Liquid  Take 5 mL (220 mg total) by mouth 3 (three) times daily with meals. dextromethorphan polistirex ER (ROBITUSSIN 12 HOUR COUGH) 30 MG/5ML Oral Suspension Extended Release  Take 5 mL (30 mg total) by mouth 2 (two) times daily. vancomycin 125 MG Oral Cap  Take 1 capsule (125 mg total) by mouth daily for 7 days. CONTINUE these medications which have NOT CHANGED    famoTIDine 40 MG/5ML Oral Recon Susp  SHAKE LIQUID AND TAKE 1.3 ML(10.4 MG) BY MOUTH TWICE DAILY FOR 30 DAYS. DISCARD REMAINDER    guaiFENesin 100 MG/5 ML Oral  Take 10 mL (200 mg total) by mouth every 4 (four) hours as needed. pantoprazole 40 MG Oral Tab EC  Take 1 tablet (40 mg total) by mouth 2 (two) times daily before meals. fluticasone propionate 50 MCG/ACT Nasal Suspension  2 sprays by Each Nare route daily. Acidophilus/Pectin Oral Cap  Take 1 capsule by mouth daily. Cholecalciferol (VITAMIN D) 2000 UNITS Oral Cap  Take 1 capsule (2,000 Units total) by mouth daily. sennosides-docusate 8.6-50 MG Oral Tab  Take 1 tablet by mouth 2 (two) times a day.       STOP taking these medications    hydrALAZINE 25 MG Oral Tab          Activity: activity as tolerated  Diet:  NPO per speech recs; daughter declining hospice, cont pleasure feeds at home   Wound Care: as directed  Code Status: DNAR/Selective Treatment      Discharge Exam:     General: no acute distress, alert and oriented x 3  Heart: RRR  Lungs: clear bilaterally, no active wheezing  Abdomen: nontender, nondistended, intact BS  Extremities: no pedal edema   Neuro: CN inact, no focal deficits      Total time coordinating care for discharge: Greater than 30 minutes    MD Terence [facility]onaK[facility]

## 2023-07-26 NOTE — SLP NOTE
SPEECH DAILY NOTE - INPATIENT    ASSESSMENT & PLAN   ASSESSMENT  Met with patient daughter in patient room to review further dysphagia education. Patient daughter with multiple questions regarding dysphagia, aspiration signs, feeding strategies, and progression of dysphagia. All questions answered to the patient's daughter's satisfaction. Diet Recommendations - Solids: NPO  Diet Recommendations - Liquids: NPO    Compensatory Strategies Recommended: Small bites and sips; Liquids via spoon  Aspiration Precautions: Slow rate;Upright position  Medication Administration Recommendations: Crushed in puree                     Discharge Recommendations       Treatment Plan  Treatment Plan/Recommendations: Dysphagia therapy              GOALS  Goal #1 SLP will provide further education/support to patient daughter as needed per her request  Met     FOLLOW UP  Follow Up Needed (Documentation Required): No  SLP Follow-up Date: 07/26/23  Number of Visits to Meet Established Goals: 1    Session: 1 of 1    If you have any questions, please contact [facility] [phone]  Pager [phone]

## 2023-07-26 NOTE — CM/SW NOTE
Pt's dtr requested referral be sent to [facility] for palliative care services. Referral sent to [facility] in [facility]. Addendum: SW received call from [place] at [facility] Deputy requesting PC order. PC order placed and attached to pt's referral. Sharyle Faes called pt's dtr and informed her to f/u with pt's PCP if further information for PC is needed since pt discharged.      ENA MejiaW  Discharge Planner

## 2023-07-26 NOTE — CM/SW NOTE
07/26/23 1000   Discharge disposition   Expected discharge disposition Home or Self   Discharge transportation [facility]     Notified by RN pt is medically cleared for discharge. DC order placed. RN stated pt's dtr is requesting transport at 2pm, [facility] at [facility]. Updated RN and pt's dtr, dtr confirmed address on Facesheet is correct. Pt's dtr requested to speak with Dr. Jordi Dennis regarding injections, relayed to RN. Pt's dtr also inquired on home SCD machine. SW informed pt's dtr SCD machines are not covered by insurance and machine and supplies will be out of pocket cost. PCS form completed and available for RN to print.       [facility]  [phone]    Samanta Florez [facility]  Discharge Planner

## 2023-07-26 NOTE — PROGRESS NOTES
NURSING DISCHARGE NOTE    Discharged Home via Ambulance. Accompanied by Family member  Belongings Taken by patient/family. Patient's daughter verbalized understanding of discharge instructions.

## 2023-07-26 NOTE — PLAN OF CARE
Patient is A/Ox1. Lethargic. [place] speaking with daughter bedside. VSS. Afebrile. Patient looks comfortable and not in pain. Patient is on RA. . Lung sounds diminished. No Cough/SOB. on Tele NSR/ST. SCD on. Electrolyte protocol-non cardiac. Pureed Diet/Nectar thickened. Denies any N/V/Diarrhea. Incontinent B&B. Patient and family updated on plan of discharging to home today.     Problem: Patient/Family Goals  Goal: Patient/Family Long Term Goal  Description: Patient's Long Term Goal: to be discharged     Interventions:  - Follow MD orders  - See additional Care Plan goals for specific interventions  Outcome: Completed  Goal: Patient/Family Short Term Goal  Description: Patient's Short Term Goal:   07/20NOC- Remain stable and get better  7/21 NOC: sleep  7/22 [address] [place]: sleep  7/23 [address] [place]: sleep  7/24: swallow food without any aspiration   7/24noc: rest, sleep, safety  7/25: eat more without cough, more awake   Interventions:  -aspiration precaution   -speech, elevate head   - IV abx  -Q2 turns  -Family at bedside   -Aspiration precautions  - See additional Care Plan goals for specific interventions  Outcome: Completed     Problem: GASTROINTESTINAL - ADULT  Goal: Maintains or returns to baseline bowel function  Description: INTERVENTIONS:  - Assess bowel function  - Maintain adequate hydration with IV or PO as ordered and tolerated  - Evaluate effectiveness of GI medications  - Encourage mobilization and activity  - Obtain nutritional consult as needed  - Establish a toileting routine/schedule  - Consider collaborating with pharmacy to review patient's medication profile  Outcome: Completed     Problem: NEUROLOGICAL - ADULT  Goal: Achieves stable or improved neurological status  Description: INTERVENTIONS  - Assess for and report changes in neurological status  - Initiate measures to prevent increased intracranial pressure  - Maintain blood pressure and fluid volume within ordered parameters to optimize cerebral perfusion and minimize risk of hemorrhage  - Monitor temperature, glucose, and sodium.  Initiate appropriate interventions as ordered  Outcome: Completed     Problem: Impaired Functional Mobility  Goal: Achieve highest/safest level of mobility/gait  Description: Interventions:  - Assess patient's functional ability and stability  - Promote increasing activity/tolerance for mobility and gait  - Educate and engage patient/family in tolerated activity level and precautions  - Recommend use of sit-stand lift for transfers  - Recommend use of total lift for transfers  - Recommend use of  RW for transfers and ambulation  - Recommend patient transfer to bedside chair toward strongest side  - When transferring patient, block weaker knee for safety  - Recommend use of chair position in bed 3 times per day  - Use towel roll for neutral alignment of cervical spine  - Elevate right lower extremity  - Elevate right lower extremity  - Recommend PRAFO boots to maintain ankle dorsiflexion range of motion  - Encourage attention to right side  Outcome: Completed

## 2023-07-26 NOTE — PROGRESS NOTES
Pt is somnolence and arousal to verbal stimuli. RA. Tele - NSR. Heparin subcutaneous. Incontinent. Briefed. Brenda. Vanco for Hx of Cdiff. BM today. No indications of pain. Upper chest bruised and healing. Bedrest per baseline, Q2 turns. IVF. Regular nectar thick liquids. Aspiration precautions. [place] suction at bedside. Family's questions answered and updated on POC. Meds crushed in applesauce. Merrem. Pt resting In bed with family at bedside. NO further needs at this time.         roblem: Patient/Family Goals  Goal: Patient/Family Long Term Goal  Description: Patient's Long Term Goal: to be discharged     Interventions:  - Follow MD orders  - See additional Care Plan goals for specific interventions  Outcome: Progressing  Goal: Patient/Family Short Term Goal  Description: Patient's Short Term Goal:   07/20NOC- Remain stable and get better  7/21 NOC: sleep  7/22 [address] [place]: sleep  7/23 [address] [place]: sleep  7/24: swallow food without any aspiration   7/24noc: rest, sleep, safety  7/25: eat more without cough, more awake   Interventions:  -aspiration precaution   -speech, elevate head   - IV abx  -Q2 turns  -Family at bedside   -Aspiration precautions  - See additional Care Plan goals for specific interventions  Outcome: Progressing     Problem: GASTROINTESTINAL - ADULT  Goal: Maintains or returns to baseline bowel function  Description: INTERVENTIONS:  - Assess bowel function  - Maintain adequate hydration with IV or PO as ordered and tolerated  - Evaluate effectiveness of GI medications  - Encourage mobilization and activity  - Obtain nutritional consult as needed  - Establish a toileting routine/schedule  - Consider collaborating with pharmacy to review patient's medication profile  Outcome: Progressing     Problem: NEUROLOGICAL - ADULT  Goal: Achieves stable or improved neurological status  Description: INTERVENTIONS  - Assess for and report changes in neurological status  - Initiate measures to prevent increased intracranial pressure  - Maintain blood pressure and fluid volume within ordered parameters to optimize cerebral perfusion and minimize risk of hemorrhage  - Monitor temperature, gluc

## 2023-08-30 PROBLEM — E87.0 HYPERNATREMIA: Status: ACTIVE | Noted: 2023-01-01

## 2023-08-31 PROBLEM — N30.00 ACUTE CYSTITIS WITHOUT HEMATURIA: Status: ACTIVE | Noted: 2023-01-01

## 2023-08-31 PROBLEM — Z71.89 COUNSELING REGARDING ADVANCE CARE PLANNING AND GOALS OF CARE: Status: ACTIVE | Noted: 2023-01-01

## 2023-08-31 PROBLEM — N17.9 AKI (ACUTE KIDNEY INJURY) (HCC): Status: ACTIVE | Noted: 2023-01-01

## 2023-08-31 NOTE — PROGRESS NOTES
08/31/23 0148   Provider Notification   Reason for Communication Critical value; Other (comment); New consult; Review case  (unstable from ED. unresponsive, agonal breathing)   Provider Name Other (comment)  (Dr. Luis Sams)   Method of Communication Call   Response See orders     Notified when pt arrived to room. Unresponsive, agonal and apneic respiration. ABG and head CT ordered.

## 2023-08-31 NOTE — CM/SW NOTE
SW received order to arrange community palliative care at discharge. Chart reviewed and pt is current with [facility]. SW will continue to remain available.      OLIVIA Harley  Discharge Planner

## 2023-08-31 NOTE — ED INITIAL ASSESSMENT (HPI)
Pt arrives via ems w family w c/o of pt being unresponsive all day. Daughter reports patient has been dehydrated starting Saturday and became unresponsive today.

## 2023-08-31 NOTE — OCCUPATIONAL THERAPY NOTE
OT orders received, chart reviewed. Pt is bedbound and total assist into w/c at home. Daughter is pt 24 hr caregiver. Pt is currently at baseline with no skilled therapy needs. OT will sign off at this time. RN is aware.

## 2023-08-31 NOTE — PROGRESS NOTES
08/31/23 0221   Provider Notification   Reason for Communication Other (comment)  (abg results)   Provider Name Other (comment)  (Dr. Steven Partida)   Method of Communication Secure Messaging   Response Other (Comment)     MD notified of abg results. Respiratory suggested cpap, POA wanted to hold off for now. Instead 2L NC was place.

## 2023-08-31 NOTE — ED QUICK NOTES
Orders for admission, patient is aware of plan and ready to go upstairs. Any questions, please call ED RN Sammie Greenwood at extension [phone]. Patient Covid vaccination status: Fully vaccinated     COVID Test Ordered in ED: SARS-CoV-2/Flu A and B/RSV by PCR (GeneXpert)    COVID Suspicion at Admission: N/A    Running Infusions:      Mental Status/LOC at time of transport: disoriented x3, hx of dementia     Other pertinent information: pt DNR selective.  Next lactic due 0122  CIWA score: N/A   NIH score:  N/A

## 2023-08-31 NOTE — CM/SW NOTE
SW received order for hospice eval. SW spoke with hospitalist who stated pt's dtr would like to speak with hospice RN. SW called [facility] ([phone]) and notified Germaine Yanez of new consult.      OLIVIA Cardona  Discharge Planner

## 2023-08-31 NOTE — PROGRESS NOTES
[facility] Pharmacy Note:  Renal Adjustment for meropenem (MERREM)    Branodn Spence is a 80year old patient who has been prescribed meropenem (MERREM) 500 mg every 12 hrs. The estimated creatinine clearance is 9.5 mL/min (A) (based on SCr of 2.27 mg/dL (H)). The dose has been adjusted to meropenem (MERREM) 500 mg every 24 hrs per hospital renal dose adjustment protocol for treatment of UTI. Pharmacy will follow and adjust dose as warranted for additional renal function changes.     Thank you,    Nadine Billings, [facility]  8/31/2023  3:50 AM

## 2023-08-31 NOTE — PHYSICAL THERAPY NOTE
PT orders received, chart reviewed. Confirmed with pt daughter at bedside, pt is bedbound and total assist into w/c at home. Daughter is pt 24 hr caregiver. Pt is currently at baseline with no skilled therapy needs, will sign off at this time. RN is aware.

## 2023-08-31 NOTE — PROGRESS NOTES
08/31/23 0708   Provider Notification   Reason for Communication Critical value; Other (comment)  (potassium 2.9)   Provider Name Rocio Oliver DO   Method of Communication Secure Messaging   Response Other (Comment)  (to see in am)     Notified MD of critical potassium. Also requested to round on first in am.   . she came from the ed very ill looking, with apneic and agonal breathing. Dr. Yulissa Lawler was notified and updated throughout the shift.  sepsis fired this morning and now pt is in afib

## 2023-08-31 NOTE — PROGRESS NOTES
08/31/23 0644   Provider Notification   Reason for Communication Change in status; Other (comment)  (afib rvr.)   Provider Name Other (comment)  (Dr. Dave Matute)   Method of Communication Secure Messaging   Response See orders   Notification Time 6500     Dr. Dave Matute was notified about pt being in AFIB RVR, sepsis BPA fired, addressed respiratory status with pt. Dr. Dave Matute reviewed in detail chart. CT head negative. Last PO meal was Saturday and pt not able to follow commands. Updated on current set of vitals signs.  /47 HR 98 SPO2 100% on 2L NC.

## 2023-08-31 NOTE — HOSPICE RN NOTE
[facility] nursing visit for follow up on hospice consult order. Met with 2 daughters, Rogerio Hernandez and Myrtle Landa. Discussed hospice benefit, hospice philosophy, palliative care with questions and concerns addressed. Hospice informational book was provided. Patient appears appropriate for inpatient level of hospice care for management of labored breathing, excess secretions, and end of life symptom. Family not ready to make a decision on hospice yet. Many other family members need to discuss all options. [facility] will continue to follow this patient closely for end of life symptom management and to support family. Please call [facility] with any questions or concerns.     Julane Fothergill RN, [facility] Liaison  [phone] after hours

## 2023-08-31 NOTE — PLAN OF CARE
Patient is alert but very lethargic, on 2L O2 satting >90%, on tele NSR, patient has a purewick in place, no BM today, has a mepelix on sacrum, no visible signs of pain during shift, patient is strict NPO, with HOB >30 degrees, getting IV metoprolol scheduled, getting IV merrem, IVF D5 @ 125, potassium replacement today, completed US kidney/bladder see results, patient has a purewick in place draining well, incontinent, patient is non-verbal, minimal movement with extremities, call light within reach, bed in low locked position with family at bedside, no further needs this shift. Problem: Patient/Family Goals  Goal: Patient/Family Long Term Goal  Description: Patient's Long Term Goal: to go home    Interventions:  - follow plan of care  -decision on hospice vs palliative care  - IVF for hydration and electrolyte balance  - maintain skin integrity  - Decrease O2 needs to baseline  - return to highest level of functioning with PT  - See additional Care Plan goals for specific interventions  Outcome: Progressing  Goal: Patient/Family Short Term Goal  Description: Patient's Short Term Goal: Tolerating eating, get rest    Interventions:   - work with SLP  - cluster care  -frequent rounding   -preemptive care based on needs  - See additional Care Plan goals for specific interventions  Outcome: Progressing     Problem: SAFETY ADULT - FALL  Goal: Free from fall injury  Description: INTERVENTIONS:  - Assess pt frequently for physical needs  - Identify cognitive and physical deficits and behaviors that affect risk of falls.   - [place] fall precautions as indicated by assessment.  - Educate pt/family on patient safety including physical limitations  - Instruct pt to call for assistance with activity based on assessment  - Modify environment to reduce risk of injury  - Provide assistive devices as appropriate  - Consider OT/PT consult to assist with strengthening/mobility  - Encourage toileting schedule  Outcome: Progressing     Problem: DISCHARGE PLANNING  Goal: Discharge to home or other facility with appropriate resources  Description: INTERVENTIONS:  - Identify barriers to discharge w/pt and caregiver  - Include patient/family/discharge partner in discharge planning  - Arrange for needed discharge resources and transportation as appropriate  - Identify discharge learning needs (meds, wound care, etc)  - Arrange for interpreters to assist at discharge as needed  - Consider post-discharge preferences of patient/family/discharge partner  - Complete POLST form as appropriate  - Assess patient's ability to be responsible for managing their own health  - Refer to Case Management Department for coordinating discharge planning if the patient needs post-hospital services based on physician/LIP order or complex needs related to functional status, cognitive ability or social support system  Outcome: Progressing     Problem: Altered Communication/Language Barrier  Goal: Patient/Family is able to understand and participate in their care  Description: Interventions:  - Assess communication ability and preferred communication style  - Implement communication aides and strategies  - Use visual cues when possible  - Listen attentively, be patient, do not interrupt  - Minimize distractions  - Allow time for understanding and response  - Establish method for patient to ask for assistance (call light)  - Provide an  as needed  - Communicate barriers and strategies to overcome with those who interact with patient  Outcome: Progressing     Problem: RESPIRATORY - ADULT  Goal: Achieves optimal ventilation and oxygenation  Description: INTERVENTIONS:  - Assess for changes in respiratory status  - Assess for changes in mentation and behavior  - Position to facilitate oxygenation and minimize respiratory effort  - Oxygen supplementation based on oxygen saturation or ABGs  - Provide Smoking Cessation handout, if applicable  - Encourage broncho-pulmonary hygiene including cough, deep breathe, Incentive Spirometry  - Assess the need for suctioning and perform as needed  - Assess and instruct to report SOB or any respiratory difficulty  - Respiratory Therapy support as indicated  - Manage/alleviate anxiety  - Monitor for signs/symptoms of CO2 retention  Outcome: Progressing     Problem: CARDIOVASCULAR - ADULT  Goal: Maintains optimal cardiac output and hemodynamic stability  Description: INTERVENTIONS:  - Monitor vital signs, rhythm, and trends  - Monitor for bleeding, hypotension and signs of decreased cardiac output  - Evaluate effectiveness of vasoactive medications to optimize hemodynamic stability  - Monitor arterial and/or venous puncture sites for bleeding and/or hematoma  - Assess quality of pulses, skin color and temperature  - Assess for signs of decreased coronary artery perfusion - ex.  Angina  - Evaluate fluid balance, assess for edema, trend weights  Outcome: Progressing  Goal: Absence of cardiac arrhythmias or at baseline  Description: INTERVENTIONS:  - Continuous cardiac monitoring, monitor vital signs, obtain 12 lead EKG if indicated  - Evaluate effectiveness of antiarrhythmic and heart rate control medications as ordered  - Initiate emergency measures for life threatening arrhythmias  - Monitor electrolytes and administer replacement therapy as ordered  Outcome: Progressing

## 2023-08-31 NOTE — PROGRESS NOTES
Pt arrived from ED in unstable respiratory condition. Pt having apneic and agonal breathing. MD was notified at bedside. Orders were placed, see order hx. RT at bedside to perform deep suction, abgs were taken and pt was placed on 2L. Improved respiratory status post deep suction. RT recommended CPAP, however, POA did not want to proceed at this time. MD aware. Pt lethargic and minimally responsive to painful stimuli, MD aware and stat CT head was order. Awaiting results.  2L. Other VSS. Navigator done with daughter whom is at bedside. Incontinent of BB, purewick placed. Bedrest Q2 turn. Pt strict NPO as pt cannot follow any commands, per daughter she has not eaten since Saturday, and only took a few spoon fulls. Speech therapy placed. IV ABX and fluids. Seizure precautions in place for hypernatremia. Updated daughter in detail POC for [address] [facility]. All questions answered. Will follow.  closely

## 2023-08-31 NOTE — SLP NOTE
Order received due to reduced appetite since this past Saturday. Patient well known to this department from previous admission and this admission. VFSS from 9/26/22 recommended NPO due to severe dysphagia. Patient's daughter elected to accept risk of aspiration and continue to feed her mother puree and moderately thick liquids by tsp. She reports she is very careful to feed her mother at home only when she is upright and all trials by tsp. She does report that she notes coughing episodes at times with eating but not regularly. Two daughters at bedside. Primary caregiver reports patient has not really eaten since 8/26 aside from 2-3 spoons of liquid here and there. Patient daughter reports reduced appetite since discharge from hospital about a month ago. Today, patient up in bed, asleep, no response to verbal or tactile stimuli. Patient with Margaret Kudo breathing pattern and audible upper airway congestion noted. No desaturation noted. Patient family with multiple questions surrounding swallowing including secretion management, etc. SLP answered questions to best of my ability. Family asked that I return tomorrow for  further follow up. Given SLP assessment of patient clinical presentation, no po trials were presented as she is not safe/appropriate for po. Family in agreement.      Eliot Saha Hitesh 87 CCC-SLP  Pager [phone]

## 2023-08-31 NOTE — DISCHARGE INSTRUCTIONS
[facility] to follow at discharge. Please call [facility] with any questions, they can be reached by phone at [phone].    \

## 2023-09-01 PROBLEM — I67.2 CEREBRAL ATHEROSCLEROSIS: Status: ACTIVE | Noted: 2023-01-01

## 2023-09-01 NOTE — PROGRESS NOTES
Patient's daughter notified this writer that she wants pt to be transitioned to hospice. Dr. Soraya Becerra and Leone Saint, SW from [facility] notified.

## 2023-09-01 NOTE — CM/SW NOTE
CARLEY met with the patient's daughter and signed hospice consents. Hospice RN will be here shortly to convert the patient to GIP. The patient has been anointed. Daughter is requesting frequent visits from all staff. She would like BP Q6 and would like to be notified before any medication administered or changed.       Leidy Huitron, DIANE  [facility]  [phone]

## 2023-09-01 NOTE — CM/SW NOTE
Notified by Hospialist pt's dtr ([address] [place]) is claiming she has a recent HCPOA from 2019 and is against hospice. Chart reviewed and HCPOA document on file completed in 2013 appoints dtr Annhong as HCPOA, not dtr [address] [place]. Case discussed with supervisor. SW met with pt's dtrs and SILs at bedside along with RN. Pt's dtr David stated she has a HCPOA form from 2019 and showed SW POLST form completed 12/2022. CARLEY informed pt's dtr David POLST form and HCPOA are different, and to bring in recent HCPOA form form appointing her as HCPOA. Pt's dtr Dwayne argued validity of HCPOA stating pt's dtr Annhong completed form herself. CARLEY informed her HCPOA form is valid and notorized in 2013. Pt's dtr voiced frustrations with care and hospice. SW reiterated pt's dtr Annhong is the HCPOA and decision maker unless an updated/completed HCPOA is provided.      Debbie Gallegos, [facility]  Discharge Planner

## 2023-09-01 NOTE — PROGRESS NOTES
09/01/23 0023   Provider Notification   Reason for Communication Critical value   Provider Name Other (comment)  (Dr. Wolfe Class)   Method of Communication Page       Paged MD pt is hypotensive took BP 3 times last BP is 72/30 on D5 @75 due to hypernatremia any new orders?  Thanks     Iv lopressor held and 1L NS started per MD order

## 2023-09-01 NOTE — SLP NOTE
Met with patient daughter per her request. Patient daughter has decided to transition patient to hospice care. All questions answered. Will sign off.      Ida Saha Hitesh 87 CCC-SLP  Pager [phone]

## 2023-09-01 NOTE — PROGRESS NOTES
08/31/23 1930   Provider Notification   Reason for Communication Other (comment)   Provider Name   (Dr. Rohit Kumar)   Method of Communication Page       Just an FYI BMP for [id number] has resulted  sodium now 144. Thanks

## 2023-09-01 NOTE — PROGRESS NOTES
Problem: Hypernatremia, poss UTI and FTT    Data: COLLEEN pt's orientation lethargic, Seizure prec d/to hypernatremia.  on 1-2L since pt's family refusing CPAP. Apneic episodes. Pt does have On tele NSR/A-fib controlled. On heparin subcut. Pt hypotensive see prior note Bolus infusing. NPO, Purewick and brief in place. Brief and purewick changed this morning. Intervention: IVF,IV abx      Edu:  Updated daughter with POC. Daughter at bedside.        Bp after bolus 102/36

## 2023-09-01 NOTE — CM/SW NOTE
CM received a call from Lucy Quiroz stating that 2 dtr are aggressively arguing about HCPOA. Dtr Molly Olivera has signed copies of HCPOA listing her as the first in line to making decisions for pt. 2 other siblings listed if Balbina was no longer able or willing to make healthcare decision for pt. Copy of the HCPOA in EPIC. A second copy of HCPOA which was notarized also listed Balbina as the HCPOA. Molly Olivera signed hospice consents today. CM met with dtrs Balbina and Yen  to discuss HCPOA. Inform both dtr that SW/CM will be discussing care with the HCPOA. Dtr Landenbrigette screaming that she has 7 siblings and the majority rules. Informed her that this is not the case. Dtr stating that she would get a . Informed her that mother is no longer A&O and that we will honor the paperwork that we have. Dtr Balbina and miri at bedside stating they appreciated the visit.      Dimitrios Rivas, MSN RN, CM

## 2023-09-02 NOTE — PLAN OF CARE
Patient stuporous, inpatient hospice   Morphine given for increase RR  Robinul ans atropine given for secretions   Patient appears comfortable w/ Morphine 1 mg IVP  POA at bedside through out day and updated on POC  D5 infusing at 10 mL/hr   Safety precautions in place

## 2023-09-02 NOTE — PROGRESS NOTES
PATIENT UNRESPONSIVE. SHE WAS TRANSITIONED TO HOSPICE CARE EARLIER TODAY. SHE APPEARS COMFORTABLE. RESPIRATIONS LABORED WITH PERIODS OF APNEA. O2 SATURATION REMAINS ABOVE 90% ON SUPPLEMENTAL O2 AT 2L/NC.   RHONCHUS. SECRETIONS MANAGED WITH ROBINUL AND ATROPINE OPTHALMIC SOLUTION 2 DROPS ORALLY. SHE IS INCONTINENT OF URINE. - KEPT CLEAN AND DRY. [address] [facility] IN PLACE. HER FAMILY IS AT THE BEDSIDE.

## 2023-09-02 NOTE — HOSPICE RN NOTE
[facility] Inpatient Nursing Rounds: pt visited at bedside, POA/daughter present. Pt minimally responsive, opened eyes this am per family. Pt having periods of apnea (20-25sec), RR =16 approx. Dyspnea and congestion noted with breaths. On 1.5L 02 via nc. Recommending IVP morphine now as well as Robinul. Discussed potential of starting morphine drip. POA and GERRY Hankins aware and agreeable if needed. No further questions at this time. No non-verbal indicators of pain present. PPS: 10    Pt inpatient hospice at this time for management of symptoms of dyspnea and secretions with IV push medication. Pt requires frequent assessment by skilled nurse for medication administration/titration and for the observation of and interventions for all current and potential symptoms related to EOL. All questions encouraged and answered, will continue to monitor for comfort. [facility] to continue to offer services and provide support to patient and family as needed. POC discussed with RNJc.     Maximino Adkins RN, BSN  [facility] Nurse Liaison  Office: [phone] or After Hours: [phone]

## 2023-09-03 NOTE — PROGRESS NOTES
09/02/23 1909   Clinical Encounter Type   Visited With Family   Routine Visit Introduction   Continue Visiting No   Patient's Supportive Strategies/Resources Patient has had  in already thus family no longer needs spiritual support at this time. Latter-day Encounters   Spiritual Requests During Visit / Hospitalization Declines  Visit   Family Spiritual Encounters   Family Participation in [facility] remains available for spiritual care as needed though [facility] consults or more urgently through ext. #[phone].

## 2023-09-03 NOTE — PLAN OF CARE
Lactic acid elevated at   Problem: Patient/Family Goals  Goal: Patient/Family Long Term Goal  Description: Patient's Long Term Goal:   PROVIDE COMFORT DURING THE DYING PROCESS    Interventions:  - HOSPICE CARE  - CARRY OUT PLAN OF CARE  - See additional Care Plan goals for specific interventions  9/2/2023 2326 by Julianna Casillas RN  Outcome: Progressing  9/2/2023 2324 by Julianna Casillas RN  Outcome: Progressing  Goal: Patient/Family Short Term Goal  Description: Patient's Short Term Goal:   09/02/2023 - KEEP PATIENT COMFORTABLE  Interventions:   - POSITION FOR COMFORT  - MORPHINE, ROBINUL & ATROPINE as ORDERED  - See additional Care Plan goals for specific interventions  9/2/2023 2326 by Julianna Casillas RN  Outcome: Progressing  9/2/2023 2324 by Julianna Casillas RN  Outcome: Progressing

## 2023-09-03 NOTE — PLAN OF CARE
9/3 AM: Pt is A/O x 1, inpatient hospice. Lung sounds are diminished, 2L for comfort, wet cough. Given Robinul x 1, suction PRN. BP Q6. Purewick/Briefed. Morphine Drip started, 1 mg/hr.      Problem: Patient/Family Goals  Goal: Patient/Family Long Term Goal  Description: Patient's Long Term Goal:   PROVIDE COMFORT DURING THE DYING PROCESS    Interventions:  - HOSPICE CARE  - CARRY OUT PLAN OF CARE  - See additional Care Plan goals for specific interventions  Outcome: Progressing  Goal: Patient/Family Short Term Goal  Description: Patient's Short Term Goal:   09/02/2023 - KEEP PATIENT COMFORTABLE  9/3 AM - Morphine Drip  Interventions:   - POSITION FOR COMFORT  - MORPHINE, ROBINUL & ATROPINE as ORDERED  - See additional Care Plan goals for specific interventions  Outcome: Progressing

## 2023-09-03 NOTE — PROGRESS NOTES
PATIENT UNRESPONSIVE. RR=26 - LABORED WITH PERIODS OF APNEA. SHE SOUNDS CONGESTED. SATURATING ABOVE 90% ON O2 AT 2L/NC. MORPHINE, ROBINUL & ATROPINE DROPS ARE BEING UTILIZED TO MANAGE BREATHING AND SECRETIONS. DISCUSSED STARTING A MORPHINE DRIP AT LENGTH WITH PATIENT'S DAUGHTER/POA BUT SHE WOULD LIKE  TO WAIT UNTIL  MORNING TO SPEAK WITH HER BROTHER FIRST. PATIENT IS INCONTINENT OF URINE - KEPT CLEAN AND DRY. [address] TO HER SACRAL AREA CHANGED. NO STOOL. REPOSITIONED FOR COMFORT. IVF as ORDERED. WILL FOLLOW.

## 2023-09-04 NOTE — PROGRESS NOTES
Hospice patient  at . Time of death confirmed by this RN and the charge RN. [facility] and hospitalist notified. Report of death packed completed. Family at bedside, all questions and concerns answered at this time. Patient's IVs taken out and post mortem care completed. [place] [facility]

## 2023-09-04 NOTE — HOSPICE RN NOTE
Pt received in bed with labored, agonal breathing and pauses. Dtr/POA bedside anxious with a lot of questions. EOL s/sx discussed at length, in addition to, Medicare requirements for GIP level of care. Pt receiving 1 mg morphine ivp hourly. Explained to dtr purpose of comfort meds to ease symptoms as pt was actively dying and that it would be a disease process and not medications that would take her mother's life not medications. Dtr agreeable to starting morphine drip and utilizing prn ivp morphine for breakthrough symptoms. Emotional support provided. Dtr encouraged to talk to and and touch her mother while she could. All questions answered. VSS.  PPS 10  Benedicto Calderon RN  [facility] TNL/SOC RN  [phone]

## 2023-09-05 NOTE — CDS QUERY
Xuan Sky    Dear Kaila Pozo ,  Clinical information (provided below) includes documentation of malnutrition by the Clinical Dietician. PLEASE INDICATE IF YOU ARE IN AGREEMENT WITH THE FOLLOWING   ASSESSMENT BY THE CLINICAL DIETICIAN:  Pt meets severe malnutrition criteria. [ x ] Yes, I agree with this assessment      [  ] No, I do not agree with this assessment             [   ] Other              [  ] Unable to be determined             If not in agreement with this assessment, please provide your independent assessment of the patient's nutritional status:  ____________________________        Documentation from the Medical Record:   Risk; poor po. Dehydration BMI 17. Weight loss . dementia . MST score -2  Clinical indicators; Dietician consultation; Pt meets severe malnutrition criteria. CRITERIA FOR MALNUTRITION DIAGNOSIS:  Criteria for severe malnutrition diagnosis: chronic illness related to wt loss greater than 5% in 1 month, energy intake less than 75% for greater than 1 month, body fat severe depletion, and muscle mass severe depletion     NUTRITION INTERVENTION:     RD nutrition Care Plan- Recommend EN (enteral nutrition) support if unable to take adequate PO safely within 1-2 days  Meal and Snacks - Advance diet as tolerated per SLP; monitor patient po intake. Encourage adequate po of appropriate diet. Medical Food Supplements - RD added Magic Cup vanilla BID once diet advanced. Rationale/use for oral supplements discussed. Vitamin and Mineral Supplements - Recommend adding Multivitamin with minerals once diet advanced. Coordination of Nutrition Care - Recommend SLP consult prior to diet advancement. and Palliative care consult for goal of care           For questions regarding this query, please contact Clinical Documentation : Rito Rowell RN, BSN, [address] ext [phone]. Jose Eduardo Doshi. [email or web address] THIS FORM IS A PERMANENT PART OF THE MEDICAL RECORD

## 2023-09-06 NOTE — DISCHARGE SUMMARY
[facility]    Death/Expiration note    Dl Fine Patient Status:  Inpatient    1928 MRN [id number]   [facility] 5NW-A Attending No att. providers found   Hosp Day # 3 PCP Mario Alberto Iglesias MD         Date and Time of Death:  9/3/2023 @2232    Preliminary Cause of Death:  Hypernatremia  Hypokalemia  Acute Renal Injury   Dehydration  Dementia  Failure to thrive    Primary Medical Condition/Diagnosis:  -FTT  -dehydration  -hypernatremia   -hypokalemia  -VERENICE  -possible uti  -bladder stone  -advanced dementia  -htn hx   - Hld x  -gerd hx  - Recurrent UTIs, ESBL      Family notified:   patient and daughter  Yes    Hospital Course:  80year old female with PMH sig for advanced dementia, bed bound, htn, hld, here for being unresponsive and ftt  Hx from daughter  For the past 4-5 days pt has not been able to eat / drink, daughter has been offering food /water but pt not able to drink / eat. She was admitted in  for aspiration pna and esbl uti. She finished abx course. Speech saw and rec NPO but family opted for pleasure feeds. Sicne the NM in  she has not been eating much. She has lost 5kg per chart review. No fevers reported at home. On admission pt had temp 100.3, wbc 16, hypernatremia and verenice noted   She had converted to afib, controlled rate  Long dw daughter re goc - see below    After prolonged discussions with family (including verbal/physical altercations between family members), patient was made hospice with comfort care protocol and ultimately  @2232 on 9/3.
